# Patient Record
Sex: MALE | Race: OTHER | NOT HISPANIC OR LATINO | URBAN - METROPOLITAN AREA
[De-identification: names, ages, dates, MRNs, and addresses within clinical notes are randomized per-mention and may not be internally consistent; named-entity substitution may affect disease eponyms.]

---

## 2018-01-18 ENCOUNTER — EMERGENCY (EMERGENCY)
Facility: HOSPITAL | Age: 68
LOS: 1 days | Discharge: HOME | End: 2018-01-18
Attending: EMERGENCY MEDICINE | Admitting: EMERGENCY MEDICINE
Payer: MEDICARE

## 2018-01-18 VITALS
DIASTOLIC BLOOD PRESSURE: 67 MMHG | SYSTOLIC BLOOD PRESSURE: 118 MMHG | TEMPERATURE: 98 F | HEART RATE: 62 BPM | RESPIRATION RATE: 18 BRPM | OXYGEN SATURATION: 97 %

## 2018-01-18 VITALS
SYSTOLIC BLOOD PRESSURE: 137 MMHG | HEART RATE: 98 BPM | RESPIRATION RATE: 18 BRPM | WEIGHT: 179.9 LBS | OXYGEN SATURATION: 98 % | DIASTOLIC BLOOD PRESSURE: 84 MMHG | TEMPERATURE: 98 F | HEIGHT: 66 IN

## 2018-01-18 LAB
ALBUMIN SERPL ELPH-MCNC: 4.1 G/DL — SIGNIFICANT CHANGE UP (ref 3.3–5.2)
ALP SERPL-CCNC: 126 U/L — HIGH (ref 40–120)
ALT FLD-CCNC: 22 U/L — SIGNIFICANT CHANGE UP
ANION GAP SERPL CALC-SCNC: 13 MMOL/L — SIGNIFICANT CHANGE UP (ref 5–17)
APTT BLD: 33.7 SEC — SIGNIFICANT CHANGE UP (ref 27.5–37.4)
AST SERPL-CCNC: 17 U/L — SIGNIFICANT CHANGE UP
BILIRUB SERPL-MCNC: 0.8 MG/DL — SIGNIFICANT CHANGE UP (ref 0.4–2)
BUN SERPL-MCNC: 19 MG/DL — SIGNIFICANT CHANGE UP (ref 8–20)
CALCIUM SERPL-MCNC: 10.1 MG/DL — SIGNIFICANT CHANGE UP (ref 8.6–10.2)
CHLORIDE SERPL-SCNC: 99 MMOL/L — SIGNIFICANT CHANGE UP (ref 98–107)
CO2 SERPL-SCNC: 27 MMOL/L — SIGNIFICANT CHANGE UP (ref 22–29)
CREAT SERPL-MCNC: 1.1 MG/DL — SIGNIFICANT CHANGE UP (ref 0.5–1.3)
GLUCOSE SERPL-MCNC: 351 MG/DL — HIGH (ref 70–115)
HCT VFR BLD CALC: 45.8 % — SIGNIFICANT CHANGE UP (ref 42–52)
HGB BLD-MCNC: 15.8 G/DL — SIGNIFICANT CHANGE UP (ref 14–18)
INR BLD: 1 RATIO — SIGNIFICANT CHANGE UP (ref 0.88–1.16)
MCHC RBC-ENTMCNC: 30.4 PG — SIGNIFICANT CHANGE UP (ref 27–31)
MCHC RBC-ENTMCNC: 34.5 G/DL — SIGNIFICANT CHANGE UP (ref 32–36)
MCV RBC AUTO: 88.1 FL — SIGNIFICANT CHANGE UP (ref 80–94)
PLATELET # BLD AUTO: 169 K/UL — SIGNIFICANT CHANGE UP (ref 150–400)
POTASSIUM SERPL-MCNC: 4.7 MMOL/L — SIGNIFICANT CHANGE UP (ref 3.5–5.3)
POTASSIUM SERPL-SCNC: 4.7 MMOL/L — SIGNIFICANT CHANGE UP (ref 3.5–5.3)
PROT SERPL-MCNC: 7 G/DL — SIGNIFICANT CHANGE UP (ref 6.6–8.7)
PROTHROM AB SERPL-ACNC: 11 SEC — SIGNIFICANT CHANGE UP (ref 9.8–12.7)
RBC # BLD: 5.2 M/UL — SIGNIFICANT CHANGE UP (ref 4.6–6.2)
RBC # FLD: 12.4 % — SIGNIFICANT CHANGE UP (ref 11–15.6)
SODIUM SERPL-SCNC: 139 MMOL/L — SIGNIFICANT CHANGE UP (ref 135–145)
TROPONIN T SERPL-MCNC: <0.01 NG/ML — SIGNIFICANT CHANGE UP (ref 0–0.06)
WBC # BLD: 5.6 K/UL — SIGNIFICANT CHANGE UP (ref 4.8–10.8)
WBC # FLD AUTO: 5.6 K/UL — SIGNIFICANT CHANGE UP (ref 4.8–10.8)

## 2018-01-18 PROCEDURE — 70551 MRI BRAIN STEM W/O DYE: CPT | Mod: 26

## 2018-01-18 PROCEDURE — 85730 THROMBOPLASTIN TIME PARTIAL: CPT

## 2018-01-18 PROCEDURE — 93010 ELECTROCARDIOGRAM REPORT: CPT

## 2018-01-18 PROCEDURE — 80053 COMPREHEN METABOLIC PANEL: CPT

## 2018-01-18 PROCEDURE — 93005 ELECTROCARDIOGRAM TRACING: CPT

## 2018-01-18 PROCEDURE — 36415 COLL VENOUS BLD VENIPUNCTURE: CPT

## 2018-01-18 PROCEDURE — 96372 THER/PROPH/DIAG INJ SC/IM: CPT

## 2018-01-18 PROCEDURE — 99284 EMERGENCY DEPT VISIT MOD MDM: CPT | Mod: 25

## 2018-01-18 PROCEDURE — 99284 EMERGENCY DEPT VISIT MOD MDM: CPT

## 2018-01-18 PROCEDURE — 85610 PROTHROMBIN TIME: CPT

## 2018-01-18 PROCEDURE — 82962 GLUCOSE BLOOD TEST: CPT

## 2018-01-18 PROCEDURE — 70551 MRI BRAIN STEM W/O DYE: CPT

## 2018-01-18 PROCEDURE — 85027 COMPLETE CBC AUTOMATED: CPT

## 2018-01-18 PROCEDURE — 84484 ASSAY OF TROPONIN QUANT: CPT

## 2018-01-18 RX ORDER — INSULIN HUMAN 100 [IU]/ML
6 INJECTION, SOLUTION SUBCUTANEOUS ONCE
Qty: 0 | Refills: 0 | Status: COMPLETED | OUTPATIENT
Start: 2018-01-18 | End: 2018-01-18

## 2018-01-18 RX ORDER — MECLIZINE HCL 12.5 MG
25 TABLET ORAL ONCE
Qty: 0 | Refills: 0 | Status: COMPLETED | OUTPATIENT
Start: 2018-01-18 | End: 2018-01-18

## 2018-01-18 RX ORDER — SODIUM CHLORIDE 9 MG/ML
1000 INJECTION INTRAMUSCULAR; INTRAVENOUS; SUBCUTANEOUS ONCE
Qty: 0 | Refills: 0 | Status: COMPLETED | OUTPATIENT
Start: 2018-01-18 | End: 2018-01-18

## 2018-01-18 RX ORDER — MECLIZINE HCL 12.5 MG
1 TABLET ORAL
Qty: 15 | Refills: 0
Start: 2018-01-18 | End: 2018-01-22

## 2018-01-18 RX ADMIN — INSULIN HUMAN 6 UNIT(S): 100 INJECTION, SOLUTION SUBCUTANEOUS at 13:10

## 2018-01-18 RX ADMIN — SODIUM CHLORIDE 1000 MILLILITER(S): 9 INJECTION INTRAMUSCULAR; INTRAVENOUS; SUBCUTANEOUS at 14:04

## 2018-01-18 RX ADMIN — Medication 25 MILLIGRAM(S): at 14:02

## 2018-01-18 NOTE — ED ADULT NURSE NOTE - OBJECTIVE STATEMENT
68 y/o male with binta of HLD & DM c/o of dizziness upon waking that began yesterday morning and repeated today. Symptoms are intermittent and pt. denies N/V, blurry vision, or HA at this time.

## 2018-01-18 NOTE — ED PROVIDER NOTE - CHPI ED SYMPTOMS NEG
no change in level of consciousness/no nausea/no numbness/no fever/no loss of consciousness/no weakness/no blurred vision/no vomiting/no confusion

## 2018-01-18 NOTE — ED PROVIDER NOTE - OBJECTIVE STATEMENT
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

## 2018-01-18 NOTE — ED PROVIDER NOTE - EYES, MLM
Clear bilaterally, pupils equal, round and reactive to light. 1-2 beat left lateral nystagmus no vertical nystagmus

## 2018-01-18 NOTE — ED PROVIDER NOTE - CARE PLAN
Principal Discharge DX:	Vertigo Principal Discharge DX:	Vertigo  Secondary Diagnosis:	DM (diabetes mellitus)

## 2019-11-15 ENCOUNTER — INPATIENT (INPATIENT)
Facility: HOSPITAL | Age: 69
LOS: 5 days | Discharge: HOME | DRG: 247 | End: 2019-11-21
Attending: INTERNAL MEDICINE | Admitting: STUDENT IN AN ORGANIZED HEALTH CARE EDUCATION/TRAINING PROGRAM
Payer: COMMERCIAL

## 2019-11-15 VITALS
HEART RATE: 95 BPM | RESPIRATION RATE: 20 BRPM | WEIGHT: 177.91 LBS | TEMPERATURE: 98 F | DIASTOLIC BLOOD PRESSURE: 74 MMHG | OXYGEN SATURATION: 97 % | HEIGHT: 66 IN | SYSTOLIC BLOOD PRESSURE: 143 MMHG

## 2019-11-15 DIAGNOSIS — I24.9 ACUTE ISCHEMIC HEART DISEASE, UNSPECIFIED: ICD-10-CM

## 2019-11-15 PROBLEM — E11.9 TYPE 2 DIABETES MELLITUS WITHOUT COMPLICATIONS: Chronic | Status: ACTIVE | Noted: 2018-01-18

## 2019-11-15 PROBLEM — E78.5 HYPERLIPIDEMIA, UNSPECIFIED: Chronic | Status: ACTIVE | Noted: 2018-01-18

## 2019-11-15 LAB
ALBUMIN SERPL ELPH-MCNC: 3.9 G/DL — SIGNIFICANT CHANGE UP (ref 3.3–5.2)
ALP SERPL-CCNC: 129 U/L — HIGH (ref 40–120)
ALT FLD-CCNC: 26 U/L — SIGNIFICANT CHANGE UP
ANION GAP SERPL CALC-SCNC: 9 MMOL/L — SIGNIFICANT CHANGE UP (ref 5–17)
APTT BLD: 27.7 SEC — SIGNIFICANT CHANGE UP (ref 27.5–36.3)
AST SERPL-CCNC: 25 U/L — SIGNIFICANT CHANGE UP
BASOPHILS # BLD AUTO: 0.02 K/UL — SIGNIFICANT CHANGE UP (ref 0–0.2)
BASOPHILS NFR BLD AUTO: 0.3 % — SIGNIFICANT CHANGE UP (ref 0–2)
BILIRUB SERPL-MCNC: 0.9 MG/DL — SIGNIFICANT CHANGE UP (ref 0.4–2)
BUN SERPL-MCNC: 19 MG/DL — SIGNIFICANT CHANGE UP (ref 8–20)
CALCIUM SERPL-MCNC: 9 MG/DL — SIGNIFICANT CHANGE UP (ref 8.6–10.2)
CHLORIDE SERPL-SCNC: 99 MMOL/L — SIGNIFICANT CHANGE UP (ref 98–107)
CO2 SERPL-SCNC: 25 MMOL/L — SIGNIFICANT CHANGE UP (ref 22–29)
CREAT SERPL-MCNC: 0.92 MG/DL — SIGNIFICANT CHANGE UP (ref 0.5–1.3)
EOSINOPHIL # BLD AUTO: 0.06 K/UL — SIGNIFICANT CHANGE UP (ref 0–0.5)
EOSINOPHIL NFR BLD AUTO: 0.9 % — SIGNIFICANT CHANGE UP (ref 0–6)
GLUCOSE SERPL-MCNC: 329 MG/DL — HIGH (ref 70–115)
HCT VFR BLD CALC: 44.5 % — SIGNIFICANT CHANGE UP (ref 39–50)
HGB BLD-MCNC: 15.1 G/DL — SIGNIFICANT CHANGE UP (ref 13–17)
IMM GRANULOCYTES NFR BLD AUTO: 0.5 % — SIGNIFICANT CHANGE UP (ref 0–1.5)
INR BLD: 0.94 RATIO — SIGNIFICANT CHANGE UP (ref 0.88–1.16)
LACTATE BLDV-MCNC: 0.7 MMOL/L — SIGNIFICANT CHANGE UP (ref 0.5–2)
LIDOCAIN IGE QN: 128 U/L — HIGH (ref 22–51)
LYMPHOCYTES # BLD AUTO: 1.14 K/UL — SIGNIFICANT CHANGE UP (ref 1–3.3)
LYMPHOCYTES # BLD AUTO: 17.1 % — SIGNIFICANT CHANGE UP (ref 13–44)
MCHC RBC-ENTMCNC: 29.1 PG — SIGNIFICANT CHANGE UP (ref 27–34)
MCHC RBC-ENTMCNC: 33.9 GM/DL — SIGNIFICANT CHANGE UP (ref 32–36)
MCV RBC AUTO: 85.7 FL — SIGNIFICANT CHANGE UP (ref 80–100)
MONOCYTES # BLD AUTO: 0.47 K/UL — SIGNIFICANT CHANGE UP (ref 0–0.9)
MONOCYTES NFR BLD AUTO: 7.1 % — SIGNIFICANT CHANGE UP (ref 2–14)
NEUTROPHILS # BLD AUTO: 4.93 K/UL — SIGNIFICANT CHANGE UP (ref 1.8–7.4)
NEUTROPHILS NFR BLD AUTO: 74.1 % — SIGNIFICANT CHANGE UP (ref 43–77)
PLATELET # BLD AUTO: 153 K/UL — SIGNIFICANT CHANGE UP (ref 150–400)
POTASSIUM SERPL-MCNC: 4.5 MMOL/L — SIGNIFICANT CHANGE UP (ref 3.5–5.3)
POTASSIUM SERPL-SCNC: 4.5 MMOL/L — SIGNIFICANT CHANGE UP (ref 3.5–5.3)
PROT SERPL-MCNC: 6.9 G/DL — SIGNIFICANT CHANGE UP (ref 6.6–8.7)
PROTHROM AB SERPL-ACNC: 10.8 SEC — SIGNIFICANT CHANGE UP (ref 10–12.9)
RBC # BLD: 5.19 M/UL — SIGNIFICANT CHANGE UP (ref 4.2–5.8)
RBC # FLD: 11.9 % — SIGNIFICANT CHANGE UP (ref 10.3–14.5)
SODIUM SERPL-SCNC: 133 MMOL/L — LOW (ref 135–145)
TROPONIN T SERPL-MCNC: <0.01 NG/ML — SIGNIFICANT CHANGE UP (ref 0–0.06)
TROPONIN T SERPL-MCNC: <0.01 NG/ML — SIGNIFICANT CHANGE UP (ref 0–0.06)
WBC # BLD: 6.65 K/UL — SIGNIFICANT CHANGE UP (ref 3.8–10.5)
WBC # FLD AUTO: 6.65 K/UL — SIGNIFICANT CHANGE UP (ref 3.8–10.5)

## 2019-11-15 PROCEDURE — 93010 ELECTROCARDIOGRAM REPORT: CPT | Mod: 76

## 2019-11-15 PROCEDURE — 99218: CPT

## 2019-11-15 PROCEDURE — 99255 IP/OBS CONSLTJ NEW/EST HI 80: CPT

## 2019-11-15 PROCEDURE — 99222 1ST HOSP IP/OBS MODERATE 55: CPT

## 2019-11-15 PROCEDURE — 71046 X-RAY EXAM CHEST 2 VIEWS: CPT | Mod: 26

## 2019-11-15 RX ORDER — GLUCAGON INJECTION, SOLUTION 0.5 MG/.1ML
1 INJECTION, SOLUTION SUBCUTANEOUS ONCE
Refills: 0 | Status: DISCONTINUED | OUTPATIENT
Start: 2019-11-15 | End: 2019-11-21

## 2019-11-15 RX ORDER — INSULIN LISPRO 100/ML
VIAL (ML) SUBCUTANEOUS AT BEDTIME
Refills: 0 | Status: DISCONTINUED | OUTPATIENT
Start: 2019-11-15 | End: 2019-11-16

## 2019-11-15 RX ORDER — NICOTINE POLACRILEX 2 MG
1 GUM BUCCAL DAILY
Refills: 0 | Status: DISCONTINUED | OUTPATIENT
Start: 2019-11-15 | End: 2019-11-21

## 2019-11-15 RX ORDER — NITROGLYCERIN 6.5 MG
0.4 CAPSULE, EXTENDED RELEASE ORAL
Refills: 0 | Status: DISCONTINUED | OUTPATIENT
Start: 2019-11-15 | End: 2019-11-21

## 2019-11-15 RX ORDER — ATORVASTATIN CALCIUM 80 MG/1
80 TABLET, FILM COATED ORAL AT BEDTIME
Refills: 0 | Status: DISCONTINUED | OUTPATIENT
Start: 2019-11-15 | End: 2019-11-21

## 2019-11-15 RX ORDER — DEXTROSE 50 % IN WATER 50 %
15 SYRINGE (ML) INTRAVENOUS ONCE
Refills: 0 | Status: DISCONTINUED | OUTPATIENT
Start: 2019-11-15 | End: 2019-11-21

## 2019-11-15 RX ORDER — LOSARTAN POTASSIUM 100 MG/1
50 TABLET, FILM COATED ORAL DAILY
Refills: 0 | Status: DISCONTINUED | OUTPATIENT
Start: 2019-11-15 | End: 2019-11-15

## 2019-11-15 RX ORDER — METOPROLOL TARTRATE 50 MG
25 TABLET ORAL
Refills: 0 | Status: DISCONTINUED | OUTPATIENT
Start: 2019-11-15 | End: 2019-11-21

## 2019-11-15 RX ORDER — FENOFIBRATE,MICRONIZED 130 MG
145 CAPSULE ORAL DAILY
Refills: 0 | Status: DISCONTINUED | OUTPATIENT
Start: 2019-11-15 | End: 2019-11-21

## 2019-11-15 RX ORDER — SODIUM CHLORIDE 9 MG/ML
1000 INJECTION, SOLUTION INTRAVENOUS
Refills: 0 | Status: DISCONTINUED | OUTPATIENT
Start: 2019-11-15 | End: 2019-11-21

## 2019-11-15 RX ORDER — ASPIRIN/CALCIUM CARB/MAGNESIUM 324 MG
81 TABLET ORAL DAILY
Refills: 0 | Status: DISCONTINUED | OUTPATIENT
Start: 2019-11-15 | End: 2019-11-17

## 2019-11-15 RX ORDER — DEXTROSE 50 % IN WATER 50 %
12.5 SYRINGE (ML) INTRAVENOUS ONCE
Refills: 0 | Status: DISCONTINUED | OUTPATIENT
Start: 2019-11-15 | End: 2019-11-21

## 2019-11-15 RX ORDER — FLUTICASONE PROPIONATE 50 MCG
2 SPRAY, SUSPENSION NASAL EVERY 12 HOURS
Refills: 0 | Status: COMPLETED | OUTPATIENT
Start: 2019-11-15 | End: 2019-11-18

## 2019-11-15 RX ORDER — LOSARTAN POTASSIUM 100 MG/1
50 TABLET, FILM COATED ORAL DAILY
Refills: 0 | Status: DISCONTINUED | OUTPATIENT
Start: 2019-11-15 | End: 2019-11-20

## 2019-11-15 RX ORDER — SODIUM CHLORIDE 9 MG/ML
1000 INJECTION INTRAMUSCULAR; INTRAVENOUS; SUBCUTANEOUS ONCE
Refills: 0 | Status: COMPLETED | OUTPATIENT
Start: 2019-11-15 | End: 2019-11-15

## 2019-11-15 RX ORDER — INSULIN LISPRO 100/ML
VIAL (ML) SUBCUTANEOUS
Refills: 0 | Status: DISCONTINUED | OUTPATIENT
Start: 2019-11-15 | End: 2019-11-16

## 2019-11-15 RX ORDER — DEXTROSE 50 % IN WATER 50 %
25 SYRINGE (ML) INTRAVENOUS ONCE
Refills: 0 | Status: DISCONTINUED | OUTPATIENT
Start: 2019-11-15 | End: 2019-11-21

## 2019-11-15 RX ORDER — SIMVASTATIN 20 MG/1
0 TABLET, FILM COATED ORAL
Qty: 0 | Refills: 0 | DISCHARGE

## 2019-11-15 RX ORDER — INSULIN GLARGINE 100 [IU]/ML
10 INJECTION, SOLUTION SUBCUTANEOUS AT BEDTIME
Refills: 0 | Status: DISCONTINUED | OUTPATIENT
Start: 2019-11-15 | End: 2019-11-16

## 2019-11-15 RX ORDER — METOPROLOL TARTRATE 50 MG
25 TABLET ORAL
Refills: 0 | Status: DISCONTINUED | OUTPATIENT
Start: 2019-11-15 | End: 2019-11-15

## 2019-11-15 RX ADMIN — SODIUM CHLORIDE 1000 MILLILITER(S): 9 INJECTION INTRAMUSCULAR; INTRAVENOUS; SUBCUTANEOUS at 16:03

## 2019-11-15 RX ADMIN — ATORVASTATIN CALCIUM 80 MILLIGRAM(S): 80 TABLET, FILM COATED ORAL at 21:18

## 2019-11-15 RX ADMIN — LOSARTAN POTASSIUM 50 MILLIGRAM(S): 100 TABLET, FILM COATED ORAL at 21:18

## 2019-11-15 RX ADMIN — INSULIN GLARGINE 10 UNIT(S): 100 INJECTION, SOLUTION SUBCUTANEOUS at 23:57

## 2019-11-15 NOTE — ED CDU PROVIDER INITIAL DAY NOTE - MEDICAL DECISION MAKING DETAILS
69 y. male PMH of DM II , HLD With 2 days substernal chest pain , heaviness and intermittent  and With SOB , non compliant with DM med at home as per himself- initially admitted to the obs - seen by cardiology r.o ACS and recommend the admission , for possible cath- serial trop is ordered will admi the pt or tele - called for admission

## 2019-11-15 NOTE — ED CDU PROVIDER INITIAL DAY NOTE - CARDIAC, MLM
Normal rate, regular rhythm.  Heart sounds S1, S2.  no jvd no chest wall TTP , NO LE edema or calf TTP

## 2019-11-15 NOTE — ED CDU PROVIDER INITIAL DAY NOTE - CONSTITUTIONAL, MLM
normal... Well appearing, well nourished, awake, alert, oriented to person, place, time/situation and in no apparent distress. on cardiac monitoring

## 2019-11-15 NOTE — ED PROVIDER NOTE - OBJECTIVE STATEMENT
Pertinent PMH/PSH/FHx/SHx and Review of Systems contained within:  Patient presents to the ED for fatigue with chest tightness and cough for 2 weeks.  PMH of HLD, NIDDM2.  PSH of distant cholecystectomy.  VSS.  family bedside.  Statesinitially ill with URI sx but cough has persisted and fatigue.  no trauma.  otherwise baseline.  Non toxic.  Well appearing. No aggravating or relieving factors. No other pertinent PMH.  No other pertinent PSH.  No other pertinent FHx.  Patient denies EtOH/tobacco/illicit substance use. No fever/chills, No photophobia/eye pain/changes in vision, No ear pain/sore throat/dysphagia, No chest pain/palpitations, no SOB/wheeze/stridor, No abdominal pain, No N/V/D, no dysuria/frequency/discharge, No neck/back pain, no rash, no changes in neurological status/function.

## 2019-11-15 NOTE — H&P ADULT - NSHPLABSRESULTS_GEN_ALL_CORE
15.1   6.65  )-----------( 153      ( 15 Nov 2019 14:48 )             44.5     11-15    133<L>  |  99  |  19.0  ----------------------------<  329<H>  4.5   |  25.0  |  0.92    Ca    9.0      15 Nov 2019 14:48    TPro  6.9  /  Alb  3.9  /  TBili  0.9  /  DBili  x   /  AST  25  /  ALT  26  /  AlkPhos  129<H>  11-15      EKG: NSR, HR 91, RBBB (also seen on 1/2018 EKG)    CARDIAC MARKERS ( 15 Nov 2019 20:08 )  x     / <0.01 ng/mL / x     / x     / x      CARDIAC MARKERS ( 15 Nov 2019 14:48 )  x     / <0.01 ng/mL / x     / x     / x

## 2019-11-15 NOTE — ED CDU PROVIDER INITIAL DAY NOTE - ATTENDING CONTRIBUTION TO CARE
69yoM; with pmh signif for HTN, HLD, DM (non-compliant with medications); now p/w chest pain and jaime x3 weeks with cough.  -serial enzymes  -cardiac monitor  -cardiology consult

## 2019-11-15 NOTE — ED CDU PROVIDER INITIAL DAY NOTE - OBJECTIVE STATEMENT
68 y/o male PMH diabetic type 2 on po med not compliant with meds, hyperlipidemia. presents in ER and c.o substernal chest pain and heavyness pain that is intermittent and get worse over the time  and non radiating associated with SOB sometimes short and some times longer 20 min is other symptoms that he did not take any med for it , states he never had before  and he Has URI symptoms for the past 2-3 weeks. denies any fever or chills , leg swollen , cough blood or leg swollen , denies any hx of DVt or PE and recent travel   Now now more heaviness. Never had similar symptoms in the past.   No n/v or diaphoresis.  Smokes a few cigs per day now but smoked more in the past.

## 2019-11-15 NOTE — H&P ADULT - HISTORY OF PRESENT ILLNESS
69yoM hx DM, HLD, medication non-compliance presenting with intermittent episodes of mid-sternal, non-radiating chest pain for the past few days.  Reports sensation of chest pressure that occurs both at rest and during exertion that typically last for up to 20 minutes.  Denies any associated dyspnea, palpitations, diaphoresis, nausea or vomiting.  Pt has never had any prior exercise testing or cardiac caths.  Of note, pt also having a 2-3 week history of non-productive cough associated with post nasal drip.  Denies any fevers or chills.  On admission, EKG showed NSR, HR 91 with RBBB which was also seen on 1/2018 EKG.  Troponin negative x 2.  CXR was negative for acute pathology.  Pt was initially admitted to CDU in ED, was seen by cardiology while there who advised medicine admission for ischemic evaluation.

## 2019-11-15 NOTE — ED PROVIDER NOTE - CLINICAL SUMMARY MEDICAL DECISION MAKING FREE TEXT BOX
Patient presetns with chest pain in the setting of recent URI.  VSS but mildly HTN.  Chest xray demonstrates no acute pathology. Lab values do not require emergent intervention. d/w Dr. Canales and will transfer to observation

## 2019-11-15 NOTE — H&P ADULT - NSHPPHYSICALEXAM_GEN_ALL_CORE
Vital Signs Last 24 Hrs  T(C): 36.6 (15 Nov 2019 19:22), Max: 37.1 (15 Nov 2019 17:45)  T(F): 97.8 (15 Nov 2019 19:22), Max: 98.7 (15 Nov 2019 17:45)  HR: 79 (15 Nov 2019 19:22) (78 - 95)  BP: 128/79 (15 Nov 2019 19:22) (128/79 - 178/78)  BP(mean): --  RR: 20 (15 Nov 2019 19:22) (18 - 20)  SpO2: 98% (15 Nov 2019 19:22) (97% - 98%)    GENERAL:  Well-appearing, not in acute distress  EYES:  Clear conjuctiva, extraocular movement intact  ENT: Moist mucous membranes  RESP:  Non-labored breathing pattern, lungs clear to ausculation in anterior fields  CV: Regular rate and rhythm, no murmurs appreciated, no lower extremity edema  GI: Soft, non-tender, non-distended  NEURO: Awake, alert, conversant, upper and lower extremity strength 5/5, light touch sensation grossly intact  PSYCH: Calm, cooperative  SKIN: No rash or lesions, warm and dry

## 2019-11-15 NOTE — ED CDU PROVIDER DISPOSITION NOTE - CLINICAL COURSE
68 y/o male PMh of DM II and HLD With 2 days substernal chest pain sharp none radiating intermittent seen by cardiology  that recommend to be admitted to medicine for possible further evaluation

## 2019-11-15 NOTE — CONSULT NOTE ADULT - ASSESSMENT
70 yo pleasant male with untreated HTN, HLD and diabetes, smoker who complains of intermittent chest discomfort. His chest discomfort is not pleuritic.   EKG with RBBB, unchanged from last year.  CE x1 negative.   No bronchitis or PNA.    1. Admit to tele, r/o ACS. serial enzymes and EKG.  2. CP is concerning for cardiac etiology. He will need ischemic evaluation  3. Start BP meds. BB and ARB  4. Statin therapy  5. TTE  6. Flonase and mucinex for URI symptoms.  7. DM as per primary team.  8. I will follow with you.

## 2019-11-15 NOTE — H&P ADULT - ATTENDING COMMENTS
Estimated LOS: 3 days Estimated LOS: 3 days    Delayed entry note:  Notified around 6:30AM that pt troponin increased to 0.08.  Started pt on heparin gtt.  Reviewed EKG which shows intraventricular delay.  Came to assess pt at bedside, he appeared comfortable but endorsing some chest discomfort.  Advised RN to give a sublingual nitro.

## 2019-11-15 NOTE — CONSULT NOTE ADULT - SUBJECTIVE AND OBJECTIVE BOX
Patient is a 69y old  Male who presents with a chief complaint of chest heaviness.     HPI:  70 yo male, diabetic not compliant with meds, hyperlipidemia. Has URI symptoms for the past 2-3 weeks. No fever or chills. Mostly post nasal drip. For the past day, he has substernal heaviness, lasting a few minutes to 20 minutes, no radiation today felt a little SOB with it. Not reproducible with cough or palpation of the anterior chest wall.   Now now more heaviness. Never had similar symptoms in the past.   No n/v or diaphoresis.  Smokes a few cigs per day now but smoked more in the past. No recent travel. No calf tenderness.     PAST MEDICAL & SURGICAL HISTORY:  HLD (hyperlipidemia)  DM (diabetes mellitus)  No significant past surgical history    Allergies  No Known Allergies    Meds at home:  Metformin  Januvia  Farxiga   Lipitor  Fenofibrate.     FAMILY HISTORY:  negative for premature CAD    SOCIAL HISTORY:  + tob use, no drug or ETOH abuse.     REVIEW OF SYSTEMS:  CONSTITUTIONAL: No fever, weight loss, + fatigue  EYES: No eye pain, visual disturbances, or discharge  ENMT:  No difficulty hearing, tinnitus, vertigo; No sinus or throat pain  NECK: No pain or stiffness  RESPIRATORY: see HPI, cough and post nasal drip  CARDIOVASCULAR: No palpitations, passing out, dizziness, or leg swelling, see HPI  GASTROINTESTINAL: No abdominal or epigastric pain. No nausea, vomiting, or hematemesis; No diarrhea or constipation. No melena or hematochezia.  GENITOURINARY: No dysuria, frequency, hematuria, or incontinence  NEUROLOGICAL: No headaches, memory loss, loss of strength, numbness, or tremors  SKIN: No itching, burning, rashes, or lesions   LYMPH Nodes: No enlarged glands  ENDOCRINE: No heat or cold intolerance  MUSCULOSKELETAL: No joint pain or swelling; No muscle, back, or extremity pain  PSYCHIATRIC: No depression, anxiety, mood swings, or difficulty sleeping  HEME/LYMPH: No easy bruising, or bleeding gums  ALLERY AND IMMUNOLOGIC: No hives or eczema	    Vital Signs Last 24 Hrs  T(C): 37.1 (15 Nov 2019 17:45), Max: 37.1 (15 Nov 2019 17:45)  T(F): 98.7 (15 Nov 2019 17:45), Max: 98.7 (15 Nov 2019 17:45)  HR: 78 (15 Nov 2019 17:45) (78 - 95)  BP: 178/78 (15 Nov 2019 17:45) (143/74 - 178/78)  RR: 18 (15 Nov 2019 17:45) (18 - 20)  SpO2: 98% (15 Nov 2019 17:45) (97% - 98%)    PHYSICAL EXAM:  Appearance: Normal, well nourished, in NAD	  HEENT:   Normal oral mucosa, PERRL, EOMI, sclera non-icteric	  Lymphatic: No cervical lymphadenopathy  Cardiovascular: Normal S1 S2, No JVD, 2/6 sm lsb. no pain on palpation of acw.    Respiratory: Lungs clear to auscultation	  Psychiatry: A & O x 3, Mood & affect appropriate  Gastrointestinal:  Soft, Non-tender, + BS, no bruits	  Skin: No rashes, No ecchymoses, No cyanosis  Neurologic: Grossly non-focal with full strength in all four extremities  Extremities: Normal range of motion, No clubbing, cyanosis or edema  Vascular: Peripheral pulses palpable 2+ bilaterally    INTERPRETATION OF TELEMETRY:  NSR    ECG: NSR, RBBB    LABS:                        15.1   6.65  )-----------( 153      ( 15 Nov 2019 14:48 )             44.5     11-15    133<L>  |  99  |  19.0  ----------------------------<  329<H>  4.5   |  25.0  |  0.92    Ca    9.0      15 Nov 2019 14:48    TPro  6.9  /  Alb  3.9  /  TBili  0.9  /  DBili  x   /  AST  25  /  ALT  26  /  AlkPhos  129<H>  11-15    CARDIAC MARKERS ( 15 Nov 2019 14:48 )  x     / <0.01 ng/mL / x     / x     / x      PT/INR - ( 15 Nov 2019 14:48 )   PT: 10.8 sec;   INR: 0.94 ratio    PTT - ( 15 Nov 2019 14:48 )  PTT:27.7 sec    RADIOLOGY & ADDITIONAL STUDIES:  CXR: Normal

## 2019-11-15 NOTE — ED ADULT NURSE REASSESSMENT NOTE - NS ED NURSE REASSESS COMMENT FT1
This nurse was called to the bedside pt appears to be anxious screaming and yelling , c/o chest pain , STAT EKG done ,VSS , pt updated regarding status

## 2019-11-16 LAB
ANION GAP SERPL CALC-SCNC: 13 MMOL/L — SIGNIFICANT CHANGE UP (ref 5–17)
APTT BLD: 68.1 SEC — HIGH (ref 27.5–36.3)
APTT BLD: 80.9 SEC — HIGH (ref 27.5–36.3)
BUN SERPL-MCNC: 17 MG/DL — SIGNIFICANT CHANGE UP (ref 8–20)
CALCIUM SERPL-MCNC: 8.4 MG/DL — LOW (ref 8.6–10.2)
CHLORIDE SERPL-SCNC: 101 MMOL/L — SIGNIFICANT CHANGE UP (ref 98–107)
CHOLEST SERPL-MCNC: 235 MG/DL — HIGH (ref 110–199)
CK SERPL-CCNC: 142 U/L — SIGNIFICANT CHANGE UP (ref 30–200)
CO2 SERPL-SCNC: 21 MMOL/L — LOW (ref 22–29)
CREAT SERPL-MCNC: 0.9 MG/DL — SIGNIFICANT CHANGE UP (ref 0.5–1.3)
GLUCOSE BLDC GLUCOMTR-MCNC: 172 MG/DL — HIGH (ref 70–99)
GLUCOSE BLDC GLUCOMTR-MCNC: 195 MG/DL — HIGH (ref 70–99)
GLUCOSE BLDC GLUCOMTR-MCNC: 203 MG/DL — HIGH (ref 70–99)
GLUCOSE BLDC GLUCOMTR-MCNC: 211 MG/DL — HIGH (ref 70–99)
GLUCOSE BLDC GLUCOMTR-MCNC: 228 MG/DL — HIGH (ref 70–99)
GLUCOSE SERPL-MCNC: 197 MG/DL — HIGH (ref 70–115)
HBA1C BLD-MCNC: 10.7 % — HIGH (ref 4–5.6)
HCT VFR BLD CALC: 42.3 % — SIGNIFICANT CHANGE UP (ref 39–50)
HDLC SERPL-MCNC: 31 MG/DL — LOW
HGB BLD-MCNC: 14.5 G/DL — SIGNIFICANT CHANGE UP (ref 13–17)
LIPID PNL WITH DIRECT LDL SERPL: 136 MG/DL — SIGNIFICANT CHANGE UP
MCHC RBC-ENTMCNC: 29.9 PG — SIGNIFICANT CHANGE UP (ref 27–34)
MCHC RBC-ENTMCNC: 34.3 GM/DL — SIGNIFICANT CHANGE UP (ref 32–36)
MCV RBC AUTO: 87.2 FL — SIGNIFICANT CHANGE UP (ref 80–100)
PLATELET # BLD AUTO: 156 K/UL — SIGNIFICANT CHANGE UP (ref 150–400)
POTASSIUM SERPL-MCNC: 4.2 MMOL/L — SIGNIFICANT CHANGE UP (ref 3.5–5.3)
POTASSIUM SERPL-SCNC: 4.2 MMOL/L — SIGNIFICANT CHANGE UP (ref 3.5–5.3)
RBC # BLD: 4.85 M/UL — SIGNIFICANT CHANGE UP (ref 4.2–5.8)
RBC # FLD: 11.9 % — SIGNIFICANT CHANGE UP (ref 10.3–14.5)
SODIUM SERPL-SCNC: 135 MMOL/L — SIGNIFICANT CHANGE UP (ref 135–145)
TOTAL CHOLESTEROL/HDL RATIO MEASUREMENT: 8 RATIO — SIGNIFICANT CHANGE UP (ref 3.4–9.6)
TRIGL SERPL-MCNC: 338 MG/DL — HIGH (ref 10–200)
TROPONIN T SERPL-MCNC: 0.08 NG/ML — HIGH (ref 0–0.06)
TROPONIN T SERPL-MCNC: 0.18 NG/ML — HIGH (ref 0–0.06)
TROPONIN T SERPL-MCNC: 0.2 NG/ML — HIGH (ref 0–0.06)
WBC # BLD: 6.65 K/UL — SIGNIFICANT CHANGE UP (ref 3.8–10.5)
WBC # FLD AUTO: 6.65 K/UL — SIGNIFICANT CHANGE UP (ref 3.8–10.5)

## 2019-11-16 PROCEDURE — 99232 SBSQ HOSP IP/OBS MODERATE 35: CPT

## 2019-11-16 PROCEDURE — 93010 ELECTROCARDIOGRAM REPORT: CPT

## 2019-11-16 PROCEDURE — 99233 SBSQ HOSP IP/OBS HIGH 50: CPT

## 2019-11-16 RX ORDER — CLOPIDOGREL BISULFATE 75 MG/1
300 TABLET, FILM COATED ORAL ONCE
Refills: 0 | Status: COMPLETED | OUTPATIENT
Start: 2019-11-16 | End: 2019-11-16

## 2019-11-16 RX ORDER — LORATADINE 10 MG/1
10 TABLET ORAL DAILY
Refills: 0 | Status: DISCONTINUED | OUTPATIENT
Start: 2019-11-16 | End: 2019-11-21

## 2019-11-16 RX ORDER — HEPARIN SODIUM 5000 [USP'U]/ML
5000 INJECTION INTRAVENOUS; SUBCUTANEOUS ONCE
Refills: 0 | Status: COMPLETED | OUTPATIENT
Start: 2019-11-16 | End: 2019-11-16

## 2019-11-16 RX ORDER — CLOPIDOGREL BISULFATE 75 MG/1
75 TABLET, FILM COATED ORAL DAILY
Refills: 0 | Status: DISCONTINUED | OUTPATIENT
Start: 2019-11-17 | End: 2019-11-21

## 2019-11-16 RX ORDER — HEPARIN SODIUM 5000 [USP'U]/ML
5000 INJECTION INTRAVENOUS; SUBCUTANEOUS EVERY 6 HOURS
Refills: 0 | Status: DISCONTINUED | OUTPATIENT
Start: 2019-11-16 | End: 2019-11-18

## 2019-11-16 RX ORDER — INSULIN GLARGINE 100 [IU]/ML
12 INJECTION, SOLUTION SUBCUTANEOUS AT BEDTIME
Refills: 0 | Status: DISCONTINUED | OUTPATIENT
Start: 2019-11-16 | End: 2019-11-21

## 2019-11-16 RX ORDER — HEPARIN SODIUM 5000 [USP'U]/ML
INJECTION INTRAVENOUS; SUBCUTANEOUS
Qty: 25000 | Refills: 0 | Status: DISCONTINUED | OUTPATIENT
Start: 2019-11-16 | End: 2019-11-18

## 2019-11-16 RX ORDER — INSULIN LISPRO 100/ML
VIAL (ML) SUBCUTANEOUS
Refills: 0 | Status: DISCONTINUED | OUTPATIENT
Start: 2019-11-16 | End: 2019-11-21

## 2019-11-16 RX ORDER — ACETAMINOPHEN 500 MG
650 TABLET ORAL EVERY 6 HOURS
Refills: 0 | Status: DISCONTINUED | OUTPATIENT
Start: 2019-11-16 | End: 2019-11-21

## 2019-11-16 RX ADMIN — Medication 4: at 21:52

## 2019-11-16 RX ADMIN — Medication 650 MILLIGRAM(S): at 21:45

## 2019-11-16 RX ADMIN — Medication 81 MILLIGRAM(S): at 13:19

## 2019-11-16 RX ADMIN — Medication 2 SPRAY(S): at 18:31

## 2019-11-16 RX ADMIN — Medication 650 MILLIGRAM(S): at 20:45

## 2019-11-16 RX ADMIN — CLOPIDOGREL BISULFATE 300 MILLIGRAM(S): 75 TABLET, FILM COATED ORAL at 17:57

## 2019-11-16 RX ADMIN — Medication 1: at 08:56

## 2019-11-16 RX ADMIN — LORATADINE 10 MILLIGRAM(S): 10 TABLET ORAL at 22:27

## 2019-11-16 RX ADMIN — HEPARIN SODIUM 900 UNIT(S)/HR: 5000 INJECTION INTRAVENOUS; SUBCUTANEOUS at 16:28

## 2019-11-16 RX ADMIN — HEPARIN SODIUM 900 UNIT(S)/HR: 5000 INJECTION INTRAVENOUS; SUBCUTANEOUS at 23:35

## 2019-11-16 RX ADMIN — Medication 145 MILLIGRAM(S): at 13:21

## 2019-11-16 RX ADMIN — HEPARIN SODIUM 1000 UNIT(S)/HR: 5000 INJECTION INTRAVENOUS; SUBCUTANEOUS at 08:57

## 2019-11-16 RX ADMIN — Medication 650 MILLIGRAM(S): at 13:42

## 2019-11-16 RX ADMIN — HEPARIN SODIUM 5000 UNIT(S): 5000 INJECTION INTRAVENOUS; SUBCUTANEOUS at 08:56

## 2019-11-16 RX ADMIN — Medication 2 SPRAY(S): at 08:56

## 2019-11-16 RX ADMIN — Medication 4: at 17:58

## 2019-11-16 RX ADMIN — ATORVASTATIN CALCIUM 80 MILLIGRAM(S): 80 TABLET, FILM COATED ORAL at 21:52

## 2019-11-16 RX ADMIN — Medication 650 MILLIGRAM(S): at 13:24

## 2019-11-16 RX ADMIN — INSULIN GLARGINE 12 UNIT(S): 100 INJECTION, SOLUTION SUBCUTANEOUS at 21:52

## 2019-11-16 NOTE — PROGRESS NOTE ADULT - ASSESSMENT
69yoM hx DM, HLD, medication non-compliance presenting with intermittent episodes of mid-sternal, non-radiating chest pain for the past few days being admitted for ischemic evaluation     NSTEMI, asymptomatic, stable  -cp resolved, trop trending after initial neg x2, now 0.18, cont trending q6h to peak  -pending intervention on monday, pt advised to inform staff of recurrent cp immediately as it may require immediate intervention  -tele  -ASA, B-blocker, ARB, statin, hep drip, Sublingual nitro PRN  -TTE ordered, pending to be done  -ekg in am, stat if cp recurs    DM2 not on insulin complicated by asymptomatic hyperglycemia, stbale  -On oral agents, but non-compliant with meds  -iss + fs achs, inc lantus to 12u, titrate to goal  -HgbA1c 10.7, uncontrolled, diabetic nurse educator cs pending    Rhinitis, likely viral, stable  -CXR negative for acute pathology   -supportive care, Flonase, Mucinex PRN    Hyponatremia, resolved    Tobacco Use, stable  -Low dose nicotine patch    Prophylactic measure  -Intermittent prophylactic measure , hep drip    Dispo. dc after cardiac w/u completed, likely tues/wedn    outpt fu. pmd, ss cardio

## 2019-11-16 NOTE — ED ADULT NURSE REASSESSMENT NOTE - NS ED NURSE REASSESS COMMENT FT1
pt denies sob denies cp denies n/v/d. trop elevated with hx of pressure on chest, started on heparin. iv checked and flushes with + blood return. afebrile will continue to monitor updated patient on plan of care, and fall precautions in place patient educated on safety measures

## 2019-11-16 NOTE — ED ADULT NURSE REASSESSMENT NOTE - NS ED NURSE REASSESS COMMENT FT1
Pt sleeping in stretcher in no apparent signs of distress. Pt remains on cardiac monitor, PIV site WNL. Pt status unchanged, refer to flowsheet and chart, pt ID band in place, pt safety maintained, pt hemodynamically stable, updated on plan of care. Awaiting clean bed. Call light provided, fall safety reinforced. Will continue to monitor

## 2019-11-16 NOTE — ED ADULT NURSE REASSESSMENT NOTE - NS ED NURSE REASSESS COMMENT FT1
Pt sleeping in stretcher in no apparent signs of distress. Pt remains on cardiac monitor, PIV site WNL. Pt with no complains of pain or discomfort at this time. Refer to flowsheet and chart, pt ID band in place, pt safety maintained, pt hemodynamically stable, updated on plan of care. Awaiting clean bed. Call light provided, fall safety reinforced. Will continue to monitor

## 2019-11-16 NOTE — PROGRESS NOTE ADULT - SUBJECTIVE AND OBJECTIVE BOX
CC: cp    Patient seen and examined at the bedside. No acute overnight events. admitted yesterday. Complaining of nothing today. Denies fever/chills, headache, lightheadedness, dizziness, chest pain, palpitations, shortness of breath, cough, abd pain, nausea/vomiting/diarrhea, muscle pain.      =========================================================================================  T(C): 36.7 (11-16-19 @ 11:44), Max: 37.1 (11-15-19 @ 17:45)  HR: 83 (11-16-19 @ 11:44) (72 - 89)  BP: 121/72 (11-16-19 @ 11:44) (100/61 - 178/78)  RR: 18 (11-16-19 @ 11:44) (18 - 20)  SpO2: 98% (11-16-19 @ 11:44) (95% - 99%)    PHYSICAL EXAM.    GEN - appears age appropriate. well nourished. pleasant. no distress.   HEENT - NCAT, EOMI, MARCOS  RESP - CTA BL, no wheeze/stridor/rhonchi/crackles. not on supplemental O2. able to speak in full sentences without distress.   CARDIO - NS1S2, RRR. No murmurs/rubs/gallops.  ABD - Soft/Non tender/Non distended. Normal BS x4 quadrants. no guarding/rebound tenderness.  Ext - No GOIVANNI.  MSK - BL 5/5 strength on upper and lower extremities.   Neuro - AAOx3. cn 2-12 grossly intact  Psych - normal affect  Skin - c/d/i. no rashes/lesions      I&O's Summary    Daily     Daily     =========================================================================================  LABS.    11-16    135  |  101  |  17.0  ----------------------------<  197<H>  4.2   |  21.0<L>  |  0.90    Ca    8.4<L>      16 Nov 2019 05:37    TPro  6.9  /  Alb  3.9  /  TBili  0.9  /  DBili  x   /  AST  25  /  ALT  26  /  AlkPhos  129<H>  11-15                          14.5   6.65  )-----------( 156      ( 16 Nov 2019 15:54 )             42.3     LIVER FUNCTIONS - ( 15 Nov 2019 14:48 )  Alb: 3.9 g/dL / Pro: 6.9 g/dL / ALK PHOS: 129 U/L / ALT: 26 U/L / AST: 25 U/L / GGT: x           PT/INR - ( 15 Nov 2019 14:48 )   PT: 10.8 sec;   INR: 0.94 ratio         PTT - ( 16 Nov 2019 15:54 )  PTT:80.9 sec  CARDIAC MARKERS ( 16 Nov 2019 15:54 )  x     / 0.18 ng/mL / x     / x     / x      CARDIAC MARKERS ( 16 Nov 2019 05:37 )  x     / 0.08 ng/mL / 142 U/L / x     / x      CARDIAC MARKERS ( 15 Nov 2019 20:08 )  x     / <0.01 ng/mL / x     / x     / x      CARDIAC MARKERS ( 15 Nov 2019 14:48 )  x     / <0.01 ng/mL / x     / x     / x                =========================================================================================  IMAGING.     =========================================================================================    HOME MEDS.    Home Medications:  fenofibrate 145 mg oral tablet: 1 tab(s) orally once a day (15 Nov 2019 22:31)  Januvia 100 mg oral tablet: 1 tab(s) orally once a day (15 Nov 2019 22:31)  Lipitor 40 mg oral tablet: 1 tab(s) orally once a day (15 Nov 2019 22:31)  metFORMIN 1000 mg oral tablet, extended release: 1 tab(s) orally 2 times a day (15 Nov 2019 22:31)      =========================================================================================    HOSPITAL MEDS.    MEDICATIONS  (STANDING):  aspirin  chewable 81 milliGRAM(s) Oral daily  atorvastatin 80 milliGRAM(s) Oral at bedtime  dextrose 5%. 1000 milliLiter(s) (50 mL/Hr) IV Continuous <Continuous>  dextrose 50% Injectable 12.5 Gram(s) IV Push once  dextrose 50% Injectable 25 Gram(s) IV Push once  dextrose 50% Injectable 25 Gram(s) IV Push once  fenofibrate Tablet 145 milliGRAM(s) Oral daily  fluticasone propionate 50 MICROgram(s)/spray Nasal Spray 2 Spray(s) Both Nostrils every 12 hours  heparin  Infusion.  Unit(s)/Hr (10 mL/Hr) IV Continuous <Continuous>  insulin glargine Injectable (LANTUS) 12 Unit(s) SubCutaneous at bedtime  insulin lispro (HumaLOG) corrective regimen sliding scale   SubCutaneous Before meals and at bedtime  loratadine 10 milliGRAM(s) Oral daily  losartan 50 milliGRAM(s) Oral daily  metoprolol tartrate 25 milliGRAM(s) Oral two times a day  nicotine -   7 mG/24Hr(s) Patch 1 patch Transdermal daily    MEDICATIONS  (PRN):  acetaminophen   Tablet .. 650 milliGRAM(s) Oral every 6 hours PRN Temp greater or equal to 38C (100.4F), Mild Pain (1 - 3), Moderate Pain (4 - 6), Severe Pain (7 - 10)  dextrose 40% Gel 15 Gram(s) Oral once PRN Blood Glucose LESS THAN 70 milliGRAM(s)/deciliter  glucagon  Injectable 1 milliGRAM(s) IntraMuscular once PRN Glucose LESS THAN 70 milligrams/deciliter  guaiFENesin  milliGRAM(s) Oral every 12 hours PRN Cough  heparin  Injectable 5000 Unit(s) IV Push every 6 hours PRN For aPTT less than 40  nitroglycerin     SubLingual 0.4 milliGRAM(s) SubLingual every 5 minutes PRN Chest Pain

## 2019-11-17 LAB
APTT BLD: 67.9 SEC — HIGH (ref 27.5–36.3)
B-OH-BUTYR SERPL-SCNC: 0.5 MMOL/L — HIGH
GLUCOSE BLDC GLUCOMTR-MCNC: 179 MG/DL — HIGH (ref 70–99)
GLUCOSE BLDC GLUCOMTR-MCNC: 190 MG/DL — HIGH (ref 70–99)
GLUCOSE BLDC GLUCOMTR-MCNC: 190 MG/DL — HIGH (ref 70–99)
GLUCOSE BLDC GLUCOMTR-MCNC: 222 MG/DL — HIGH (ref 70–99)
HCV AB S/CO SERPL IA: 0.1 S/CO — SIGNIFICANT CHANGE UP (ref 0–0.99)
HCV AB SERPL-IMP: SIGNIFICANT CHANGE UP
TROPONIN T SERPL-MCNC: 0.26 NG/ML — HIGH (ref 0–0.06)

## 2019-11-17 PROCEDURE — 99232 SBSQ HOSP IP/OBS MODERATE 35: CPT

## 2019-11-17 PROCEDURE — 99233 SBSQ HOSP IP/OBS HIGH 50: CPT

## 2019-11-17 PROCEDURE — 93010 ELECTROCARDIOGRAM REPORT: CPT | Mod: 76

## 2019-11-17 RX ORDER — ASPIRIN/CALCIUM CARB/MAGNESIUM 324 MG
325 TABLET ORAL DAILY
Refills: 0 | Status: DISCONTINUED | OUTPATIENT
Start: 2019-11-17 | End: 2019-11-18

## 2019-11-17 RX ADMIN — Medication 81 MILLIGRAM(S): at 09:03

## 2019-11-17 RX ADMIN — LOSARTAN POTASSIUM 50 MILLIGRAM(S): 100 TABLET, FILM COATED ORAL at 05:39

## 2019-11-17 RX ADMIN — Medication 2 SPRAY(S): at 05:38

## 2019-11-17 RX ADMIN — LORATADINE 10 MILLIGRAM(S): 10 TABLET ORAL at 09:03

## 2019-11-17 RX ADMIN — Medication 2: at 17:44

## 2019-11-17 RX ADMIN — Medication 145 MILLIGRAM(S): at 09:03

## 2019-11-17 RX ADMIN — Medication 2: at 12:50

## 2019-11-17 RX ADMIN — INSULIN GLARGINE 12 UNIT(S): 100 INJECTION, SOLUTION SUBCUTANEOUS at 21:57

## 2019-11-17 RX ADMIN — Medication 650 MILLIGRAM(S): at 21:58

## 2019-11-17 RX ADMIN — Medication 650 MILLIGRAM(S): at 19:41

## 2019-11-17 RX ADMIN — CLOPIDOGREL BISULFATE 75 MILLIGRAM(S): 75 TABLET, FILM COATED ORAL at 09:03

## 2019-11-17 RX ADMIN — Medication 2 SPRAY(S): at 18:03

## 2019-11-17 RX ADMIN — Medication 4: at 21:57

## 2019-11-17 RX ADMIN — HEPARIN SODIUM 900 UNIT(S)/HR: 5000 INJECTION INTRAVENOUS; SUBCUTANEOUS at 07:02

## 2019-11-17 RX ADMIN — ATORVASTATIN CALCIUM 80 MILLIGRAM(S): 80 TABLET, FILM COATED ORAL at 21:57

## 2019-11-17 RX ADMIN — Medication 325 MILLIGRAM(S): at 11:33

## 2019-11-17 RX ADMIN — Medication 25 MILLIGRAM(S): at 05:40

## 2019-11-17 RX ADMIN — Medication 25 MILLIGRAM(S): at 18:03

## 2019-11-17 RX ADMIN — Medication 2: at 09:00

## 2019-11-17 NOTE — PROGRESS NOTE ADULT - SUBJECTIVE AND OBJECTIVE BOX
HEALTH ISSUES - PROBLEM Dx:    NSTEMI    INTERVAL HPI/ OVERNIGHT EVENTS:    comfortable lying in bed  offers no complaints  states he has not seen his cardiologist in 2 yrs and PMD in 1 yr and that he is noncomplaint with his medications    REVIEW OF SYSTEMS:    CP- sob- palpitations-     Vital Signs Last 24 Hrs  T(C): 36.9 (17 Nov 2019 10:10), Max: 37.1 (16 Nov 2019 18:27)  T(F): 98.5 (17 Nov 2019 10:10), Max: 98.7 (16 Nov 2019 18:27)  HR: 80 (17 Nov 2019 10:10) (75 - 81)  BP: 114/71 (17 Nov 2019 10:10) (114/71 - 143/66)  BP(mean): --  RR: 16 (17 Nov 2019 10:10) (16 - 16)  SpO2: 98% (17 Nov 2019 10:10) (97% - 98%)    PHYSICAL EXAM-  GENERAL: comfortable no distress, pain free  HEAD:  Atraumatic, Normocephalic  EYES: EOMI, conjunctiva and sclera clear  ENT: Moist mucous membranes, No lesions  NECK: Supple, Normal thyroid  NERVOUS SYSTEM:  Alert & Oriented X 3, Motor Strength 5/5 B/L upper and lower extremities  CHEST/LUNG: Clear to percussion bilaterally; No rales, rhonchi, wheezing, or rubs  HEART: Regular rate and rhythm; No murmurs, rubs, or gallops  ABDOMEN: Soft, Nontender, Nondistended; Bowel sounds present  EXTREMITIES:  2+ Peripheral Pulses, No clubbing, cyanosis, or edema      MEDICATIONS  (STANDING):  aspirin 325 milliGRAM(s) Oral daily  atorvastatin 80 milliGRAM(s) Oral at bedtime  clopidogrel Tablet 75 milliGRAM(s) Oral daily  dextrose 5%. 1000 milliLiter(s) (50 mL/Hr) IV Continuous <Continuous>  dextrose 50% Injectable 12.5 Gram(s) IV Push once  dextrose 50% Injectable 25 Gram(s) IV Push once  dextrose 50% Injectable 25 Gram(s) IV Push once  fenofibrate Tablet 145 milliGRAM(s) Oral daily  fluticasone propionate 50 MICROgram(s)/spray Nasal Spray 2 Spray(s) Both Nostrils every 12 hours  heparin  Infusion.  Unit(s)/Hr (10 mL/Hr) IV Continuous <Continuous>  insulin glargine Injectable (LANTUS) 12 Unit(s) SubCutaneous at bedtime  insulin lispro (HumaLOG) corrective regimen sliding scale   SubCutaneous Before meals and at bedtime  loratadine 10 milliGRAM(s) Oral daily  losartan 50 milliGRAM(s) Oral daily  metoprolol tartrate 25 milliGRAM(s) Oral two times a day  nicotine -   7 mG/24Hr(s) Patch 1 patch Transdermal daily    MEDICATIONS  (PRN):  acetaminophen   Tablet .. 650 milliGRAM(s) Oral every 6 hours PRN Temp greater or equal to 38C (100.4F), Mild Pain (1 - 3), Moderate Pain (4 - 6), Severe Pain (7 - 10)  dextrose 40% Gel 15 Gram(s) Oral once PRN Blood Glucose LESS THAN 70 milliGRAM(s)/deciliter  glucagon  Injectable 1 milliGRAM(s) IntraMuscular once PRN Glucose LESS THAN 70 milligrams/deciliter  guaiFENesin  milliGRAM(s) Oral every 12 hours PRN Cough  heparin  Injectable 5000 Unit(s) IV Push every 6 hours PRN For aPTT less than 40  nitroglycerin     SubLingual 0.4 milliGRAM(s) SubLingual every 5 minutes PRN Chest Pain      LABS:                        14.5   6.65  )-----------( 156      ( 16 Nov 2019 15:54 )             42.3     11-16    135  |  101  |  17.0  ----------------------------<  197<H>  4.2   |  21.0<L>  |  0.90    Ca    8.4<L>      16 Nov 2019 05:37    TPro  6.9  /  Alb  3.9  /  TBili  0.9  /  DBili  x   /  AST  25  /  ALT  26  /  AlkPhos  129<H>  11-15    PT/INR - ( 15 Nov 2019 14:48 )   PT: 10.8 sec;   INR: 0.94 ratio         PTT - ( 17 Nov 2019 06:22 )  PTT:67.9 sec

## 2019-11-17 NOTE — PROGRESS NOTE ADULT - ASSESSMENT
68 yo pleasant male with untreated HTN, HLD and diabetes, smoker who complains of intermittent chest discomfort. His chest discomfort is not pleuritic.   Heparin drip initiated as well as ACEs medications (statins, ACE, BB, Dapt: plavix 75mg and aspirin 81mg daily).        1. ACS - serial enzymes trending up x 4   2. CP is concerning for cardiac etiology. He will need ischemic evaluation.  Possible cath on Monday  3. BP stable 114/71 ct with BB and ACE.    4. Statin therapy  5. TTE pending

## 2019-11-17 NOTE — PROGRESS NOTE ADULT - ASSESSMENT
69yoM hx DM, HLD, medication non-compliance presenting with intermittent episodes of mid-sternal, non-radiating chest pain for the past few days being admitted for ischemic evaluation     NSTEMI  troponin uptrending  asa, bb, statin, heparin gtt, ARB  s/p plavix loading  cath in AM    DM2 uncontrolled  admits to noncompliance  insulin in house  titrate as needed  will likely need insulin at discharge    Dyslipidemia uncontrolled  on statins now in addition to fenofibrate    Noncompliance  counselling done    Rhinitis, likely viral, stable  -CXR negative for acute pathology   -supportive care, Flonase, Mucinex PRN    Hyponatremia, resolved    Tobacco Use, stable  -Low dose nicotine patch    Prophylactic measure  -Intermittent prophylactic measure , hep drip    Dispo. dc after cardiac w/u completed, likely tues/ wedn    outpt fu. pmd, ss cardio

## 2019-11-18 ENCOUNTER — TRANSCRIPTION ENCOUNTER (OUTPATIENT)
Age: 69
End: 2019-11-18

## 2019-11-18 LAB
ANION GAP SERPL CALC-SCNC: 13 MMOL/L — SIGNIFICANT CHANGE UP (ref 5–17)
APTT BLD: 66.7 SEC — HIGH (ref 27.5–36.3)
BUN SERPL-MCNC: 26 MG/DL — HIGH (ref 8–20)
CALCIUM SERPL-MCNC: 9.1 MG/DL — SIGNIFICANT CHANGE UP (ref 8.6–10.2)
CHLORIDE SERPL-SCNC: 101 MMOL/L — SIGNIFICANT CHANGE UP (ref 98–107)
CO2 SERPL-SCNC: 26 MMOL/L — SIGNIFICANT CHANGE UP (ref 22–29)
CREAT SERPL-MCNC: 1.16 MG/DL — SIGNIFICANT CHANGE UP (ref 0.5–1.3)
GLUCOSE BLDC GLUCOMTR-MCNC: 121 MG/DL — HIGH (ref 70–99)
GLUCOSE BLDC GLUCOMTR-MCNC: 124 MG/DL — HIGH (ref 70–99)
GLUCOSE BLDC GLUCOMTR-MCNC: 124 MG/DL — HIGH (ref 70–99)
GLUCOSE BLDC GLUCOMTR-MCNC: 155 MG/DL — HIGH (ref 70–99)
GLUCOSE SERPL-MCNC: 127 MG/DL — HIGH (ref 70–115)
HCT VFR BLD CALC: 43.2 % — SIGNIFICANT CHANGE UP (ref 39–50)
HGB BLD-MCNC: 14.6 G/DL — SIGNIFICANT CHANGE UP (ref 13–17)
INR BLD: 1 RATIO — SIGNIFICANT CHANGE UP (ref 0.88–1.16)
MCHC RBC-ENTMCNC: 29.4 PG — SIGNIFICANT CHANGE UP (ref 27–34)
MCHC RBC-ENTMCNC: 33.8 GM/DL — SIGNIFICANT CHANGE UP (ref 32–36)
MCV RBC AUTO: 87.1 FL — SIGNIFICANT CHANGE UP (ref 80–100)
PLATELET # BLD AUTO: 158 K/UL — SIGNIFICANT CHANGE UP (ref 150–400)
POTASSIUM SERPL-MCNC: 4.4 MMOL/L — SIGNIFICANT CHANGE UP (ref 3.5–5.3)
POTASSIUM SERPL-SCNC: 4.4 MMOL/L — SIGNIFICANT CHANGE UP (ref 3.5–5.3)
PROTHROM AB SERPL-ACNC: 11.5 SEC — SIGNIFICANT CHANGE UP (ref 10–12.9)
RBC # BLD: 4.96 M/UL — SIGNIFICANT CHANGE UP (ref 4.2–5.8)
RBC # FLD: 12.2 % — SIGNIFICANT CHANGE UP (ref 10.3–14.5)
SODIUM SERPL-SCNC: 140 MMOL/L — SIGNIFICANT CHANGE UP (ref 135–145)
TROPONIN T SERPL-MCNC: 0.33 NG/ML — HIGH (ref 0–0.06)
WBC # BLD: 6.17 K/UL — SIGNIFICANT CHANGE UP (ref 3.8–10.5)
WBC # FLD AUTO: 6.17 K/UL — SIGNIFICANT CHANGE UP (ref 3.8–10.5)

## 2019-11-18 PROCEDURE — 99232 SBSQ HOSP IP/OBS MODERATE 35: CPT | Mod: 25

## 2019-11-18 PROCEDURE — 93454 CORONARY ARTERY ANGIO S&I: CPT | Mod: 26,XU

## 2019-11-18 PROCEDURE — 99152 MOD SED SAME PHYS/QHP 5/>YRS: CPT

## 2019-11-18 PROCEDURE — 99232 SBSQ HOSP IP/OBS MODERATE 35: CPT

## 2019-11-18 PROCEDURE — 93010 ELECTROCARDIOGRAM REPORT: CPT

## 2019-11-18 PROCEDURE — 93306 TTE W/DOPPLER COMPLETE: CPT | Mod: 26

## 2019-11-18 PROCEDURE — 93571 IV DOP VEL&/PRESS C FLO 1ST: CPT | Mod: 26,LD

## 2019-11-18 PROCEDURE — 99233 SBSQ HOSP IP/OBS HIGH 50: CPT

## 2019-11-18 PROCEDURE — 92941 PRQ TRLML REVSC TOT OCCL AMI: CPT | Mod: RC

## 2019-11-18 PROCEDURE — 92978 ENDOLUMINL IVUS OCT C 1ST: CPT | Mod: 26,RC

## 2019-11-18 RX ORDER — ASPIRIN/CALCIUM CARB/MAGNESIUM 324 MG
81 TABLET ORAL DAILY
Refills: 0 | Status: DISCONTINUED | OUTPATIENT
Start: 2019-11-18 | End: 2019-11-21

## 2019-11-18 RX ADMIN — HEPARIN SODIUM 900 UNIT(S)/HR: 5000 INJECTION INTRAVENOUS; SUBCUTANEOUS at 06:53

## 2019-11-18 RX ADMIN — Medication 25 MILLIGRAM(S): at 05:32

## 2019-11-18 RX ADMIN — LORATADINE 10 MILLIGRAM(S): 10 TABLET ORAL at 11:41

## 2019-11-18 RX ADMIN — ATORVASTATIN CALCIUM 80 MILLIGRAM(S): 80 TABLET, FILM COATED ORAL at 21:28

## 2019-11-18 RX ADMIN — CLOPIDOGREL BISULFATE 75 MILLIGRAM(S): 75 TABLET, FILM COATED ORAL at 11:41

## 2019-11-18 RX ADMIN — Medication 2 SPRAY(S): at 05:32

## 2019-11-18 RX ADMIN — Medication 25 MILLIGRAM(S): at 19:06

## 2019-11-18 RX ADMIN — LOSARTAN POTASSIUM 50 MILLIGRAM(S): 100 TABLET, FILM COATED ORAL at 05:32

## 2019-11-18 RX ADMIN — INSULIN GLARGINE 12 UNIT(S): 100 INJECTION, SOLUTION SUBCUTANEOUS at 21:28

## 2019-11-18 RX ADMIN — Medication 325 MILLIGRAM(S): at 11:41

## 2019-11-18 RX ADMIN — Medication 145 MILLIGRAM(S): at 11:41

## 2019-11-18 RX ADMIN — Medication 2 SPRAY(S): at 19:06

## 2019-11-18 RX ADMIN — Medication 2: at 21:29

## 2019-11-18 NOTE — DISCHARGE NOTE PROVIDER - HOSPITAL COURSE
69 year old male who p/w chest pain.  S/P PCI with KOFI to mRCA and for staged procedure to LAD 69 year old male who p/w chest pain.  S/P PCI with KOFI to mRCA and for staged procedure to LAD    S/P KOFI to LAD    S/P POBA to D1    had 1 episode hypotension inc ath lab. ARB D/Madhu    going home on insulin    Medically stable and agreeable with discharge and follow up plan. Patient was advised to return to ED if any symptoms occur or worsen.    time 40 mins

## 2019-11-18 NOTE — PROGRESS NOTE ADULT - SUBJECTIVE AND OBJECTIVE BOX
Patient s/p PCI to RCA 99% - 1 KOFI.     Severe LAD/diagonal disease. Plan for PCI on wednesday. Femoral approach. Staged.     Continue aspirin, plavix.

## 2019-11-18 NOTE — DISCHARGE NOTE PROVIDER - NSDCFUADDINST_GEN_ALL_CORE_FT
Restricted use with no heavy lifting of affected arm for 48 hours.  No submerging the arm in water for 48 hours.  You may start showering today.  Call your doctor for any bleeding, swelling, loss of sensation in the hand or fingers, or fingers turning blue.  If heavy bleeding or large lumps form, hold pressure at the spot and come to the Emergency Room. Restricted use with no heavy lifting of affected arm for 48 hours.  No submerging the arm in water for 48 hours.  You may start showering today.  Call your doctor for any bleeding, swelling, loss of sensation in the hand or fingers, or fingers turning blue.  If heavy bleeding or large lumps form, hold pressure at the spot and come to the Emergency Room.  pt going home with insulin.

## 2019-11-18 NOTE — DISCHARGE NOTE PROVIDER - NSDCPNSUBOBJ_GEN_ALL_CORE
HEALTH ISSUES - PROBLEM Dx:        NSTEMI        INTERVAL HPI/ OVERNIGHT EVENTS:        s/p s/p PCI to RCA 99% - 1 KOFI.     S/P KOFI to LAD and POBA to D1- today        REVIEW OF SYSTEMS:        CP- sob- palpitations-             ICU Vital Signs Last 24 Hrs    T(C): 36.6 (21 Nov 2019 06:07), Max: 36.9 (20 Nov 2019 15:51)    T(F): 97.8 (21 Nov 2019 06:07), Max: 98.4 (20 Nov 2019 15:51)    HR: 75 (21 Nov 2019 06:07) (64 - 88)    BP: 108/50 (21 Nov 2019 06:07) (99/60 - 121/65)    BP(mean): --    ABP: --    ABP(mean): --    RR: 16 (21 Nov 2019 06:07) (16 - 18)    SpO2: 97% (21 Nov 2019 06:07) (96% - 99%)            PHYSICAL EXAM-    GENERAL: comfortable no distress, pain free    HEAD:  Atraumatic, Normocephalic    EYES: EOMI, conjunctiva and sclera clear    ENT: Moist mucous membranes, No lesions    NECK: Supple, Normal thyroid    NERVOUS SYSTEM:  Alert & Oriented X 3, Motor Strength 5/5 B/L upper and lower extremities    CHEST/LUNG: Clear to percussion bilaterally; No rales, rhonchi, wheezing, or rubs    HEART: Regular rate and rhythm; No murmurs, rubs, or gallops    ABDOMEN: Soft, Nontender, Nondistended; Bowel sounds present, Rt groin angioseal    EXTREMITIES:  2+ Peripheral Pulses, No clubbing, cyanosis, or edema        MEDICATIONS  (STANDING):    aspirin  chewable 81 milliGRAM(s) Oral daily    atorvastatin 80 milliGRAM(s) Oral at bedtime    clopidogrel Tablet 75 milliGRAM(s) Oral daily    dextrose 5%. 1000 milliLiter(s) (50 mL/Hr) IV Continuous <Continuous>    dextrose 50% Injectable 12.5 Gram(s) IV Push once    dextrose 50% Injectable 25 Gram(s) IV Push once    dextrose 50% Injectable 25 Gram(s) IV Push once    fenofibrate Tablet 145 milliGRAM(s) Oral daily    insulin glargine Injectable (LANTUS) 12 Unit(s) SubCutaneous at bedtime    insulin lispro (HumaLOG) corrective regimen sliding scale   SubCutaneous Before meals and at bedtime    loratadine 10 milliGRAM(s) Oral daily    metoprolol tartrate 25 milliGRAM(s) Oral two times a day    nicotine -   7 mG/24Hr(s) Patch 1 patch Transdermal daily        MEDICATIONS  (PRN):    acetaminophen   Tablet .. 650 milliGRAM(s) Oral every 6 hours PRN Temp greater or equal to 38C (100.4F), Mild Pain (1 - 3), Moderate Pain (4 - 6), Severe Pain (7 - 10)    dextrose 40% Gel 15 Gram(s) Oral once PRN Blood Glucose LESS THAN 70 milliGRAM(s)/deciliter    glucagon  Injectable 1 milliGRAM(s) IntraMuscular once PRN Glucose LESS THAN 70 milligrams/deciliter    guaiFENesin  milliGRAM(s) Oral every 12 hours PRN Cough    nitroglycerin     SubLingual 0.4 milliGRAM(s) SubLingual every 5 minutes PRN Chest Pain            LABS:                            14.4     6.51  )-----------( 164      ( 19 Nov 2019 06:18 )               45.1         11-19        138  |  99  |  28.0<H>    ----------------------------<  135<H>    4.4   |  26.0  |  1.11        Ca    9.0      19 Nov 2019 06:18    Mg     2.2     11-19            PTT - ( 19 Nov 2019 06:18 )  PTT:30.2 sec                Assessment and Plan:     · Assessment	    69yoM hx DM, HLD, medication non-compliance presenting with intermittent episodes of mid-sternal, non-radiating chest pain for the past few days being admitted for ischemic evaluation         NSTEMI    s/p s/p PCI to RCA 99% - 1 KOFI.     S/P KOFI to LAD and POBA to D1    asa, bb, statin, heparin gtt, ARB    s/p plavix loading        Hypotension to SBP 80s in cathlab    pt had taken all his AM meds    ARB was started to control HTN    now being d/padmaja per cardiology sec to hypotensive episode        DM2 uncontrolled    admits to noncompliance    insulin in house    titrate as needed    Lantus/ metformin 48 hrs later and Farxiga at discharge        Dyslipidemia uncontrolled    on statins now in addition to fenofibrate        Noncompliance    counselling done        Rhinitis, likely viral, stable    CXR negative for acute pathology     supportive care, Flonase, Mucinex PRN        Hyponatremia, resolved        Tobacco Use, stable    Low dose nicotine patch

## 2019-11-18 NOTE — PROGRESS NOTE ADULT - ASSESSMENT
This is a 68 y/o male with untreated HTN, HLD and diabetes, smoker who complains of intermittent chest discomfort. His chest discomfort is not pleuritic. Heparin drip initiated as well as ACEs medications (statins, ACE, BB, Dapt: plavix 75mg and aspirin 81mg daily).  CP is concerning for cardiac etiology. He will need ischemic evaluation. [facility] today, echo showed EF 60-65% w/ grade 1 diastolic dysfunction.     Indication for Cath: CCS4 unstable angina   ASA 2  Mallampati 2  EF 60-65%   GFR 64  PCI Bleed risk: 1.1%

## 2019-11-18 NOTE — PROGRESS NOTE ADULT - SUBJECTIVE AND OBJECTIVE BOX
[place] [facility]                          [address] [place] [address]                       Phone: [phone]. Fax:[phone]                      ________________________________________________           Reason for follow up/Overnight events: follow up     HPI:  69yoM hx DM, HLD, medication non-compliance presenting with intermittent episodes of mid-sternal, non-radiating chest pain for the past few days.  Reports sensation of chest pressure that occurs both at rest and during exertion that typically last for up to 20 minutes.  Denies any associated dyspnea, palpitations, diaphoresis, nausea or vomiting.  Pt has never had any prior exercise testing or cardiac caths.  Of note, pt also having a 2-3 week history of non-productive cough associated with post nasal drip.  Denies any fevers or chills.  On admission, EKG showed NSR, HR 91 with RBBB which was also seen on 2018 EKG.  Troponin negative x 2.  CXR was negative for acute pathology.  Pt was initially admitted to CDU in ED, was seen by cardiology while there who advised medicine admission for ischemic evaluation. (15 Nov 2019 20:14)      ROS: All review of systems negative unless indicated otherwise below.                          LAB RESULTS                   COMPLETE BLOOD COUNT( 2019 06:31 )                            14.6 g/dL  6.17 K/uL )---------------( 158 K/uL                        43.2 %      Automated Differential     Auto Basophil # - X      Auto Basophil % - X      Auto Eosinophil # - X      Auto Eosinophil % - X      Auto Immature Granulocyte # - X      Auto Immature Granulocyte % - X      Auto Lymphocyte # - X      Auto Lymphocyte % - X      Auto Monocyte # - X      Auto Monocyte % - X      Auto Neutrophil # - X      Auto Neutrophil % - X                                      CHEMISTRY                 Basic Metabolic Panel (19 @ 06:31)    140  |  101  |  26.0<H>  ----------------------------<  127<H>  4.4   |  26.0  |  1.16    Ca    9.1      2019 06:31                    Liver Functions (11-15-19 @ 14:48))  TPro  6.9  /  Alb  3.9  /  TBili  0.9  /  DBili  x   /  AST  25  /  ALT  26  /  AlkPhos  129     PT/INR/PTT ( 2019 06:31 )                        :                       :      11.5         :       66.7                  .        .                   .              .           .       1.00        .                                                                   Cardiac Enzymes   ( 2019 06:31 )  Troponin T  0.33<H>,  CPK  X    , CKMB  X    , BNP X        , ( 2019 06:19 )  Troponin T  0.26<H>,  CPK  X    , CKMB  X    , BNP X        , ( 2019 20:58 )  Troponin T  0.20<H>,  CPK  X    , CKMB  X    , BNP X                              RADIOLOGY RESULTS: Personally visualized   < from: Xray Chest 2 Views PA/Lat (11.15.19 @ 15:28) >  IMPRESSION:    No acute cardiopulmonary disease process.    < end of copied text >                          CARDIOLOGY RESULTS: Official Report/Preliminary Verbal Reports  ECHO:   < from: TTE Echo w/Cont Complete (19 @ 09:42) >  Summary:   1. Technically good study.   2. Normal global left ventricular systolic function.   3. Left ventricular ejection fraction, by visual estimation, is 60 to   65%.   4. Spectral Doppler shows impaired relaxation pattern of left   ventricular myocardial filling (Grade I diastolic dysfunction).   5. There is no evidence of pericardial effusion.   6. No significant valvular heart disease.    MD Mark Electronically signed on 2019 at 10:51:27 AM    < end of copied text >                          CARDIOLOGY REVIEW: Personally visualized and reviewed  Telemetry Last 24h: SR/ST                              DAILY WEIGHTS - 48 HOUR TREND     Daily Weight in k.1 (2019 05:08), Weight in k.3 (2019 04:38)    HOME MEDICATIONS:  fenofibrate 145 mg oral tablet: 1 tab(s) orally once a day (15 Nov 2019 22:31)  Januvia 100 mg oral tablet: 1 tab(s) orally once a day (15 Nov 2019 22:31)  Lipitor 40 mg oral tablet: 1 tab(s) orally once a day (15 Nov 2019 22:31)  metFORMIN 1000 mg oral tablet, extended release: 1 tab(s) orally 2 times a day (15 Nov 2019 22:31)                             Current Admission Active Medications    acetaminophen   Tablet .. 650 milliGRAM(s) Oral every 6 hours PRN Temp greater or equal to 38C (100.4F), Mild Pain (1 - 3), Moderate Pain (4 - 6), Severe Pain (7 - 10)  aspirin 325 milliGRAM(s) Oral daily  atorvastatin 80 milliGRAM(s) Oral at bedtime  clopidogrel Tablet 75 milliGRAM(s) Oral daily  dextrose 40% Gel 15 Gram(s) Oral once PRN Blood Glucose LESS THAN 70 milliGRAM(s)/deciliter  dextrose 5%. 1000 milliLiter(s) (50 mL/Hr) IV Continuous <Continuous>  dextrose 50% Injectable 12.5 Gram(s) IV Push once  dextrose 50% Injectable 25 Gram(s) IV Push once  dextrose 50% Injectable 25 Gram(s) IV Push once  fenofibrate Tablet 145 milliGRAM(s) Oral daily  fluticasone propionate 50 MICROgram(s)/spray Nasal Spray 2 Spray(s) Both Nostrils every 12 hours  glucagon  Injectable 1 milliGRAM(s) IntraMuscular once PRN Glucose LESS THAN 70 milligrams/deciliter  guaiFENesin  milliGRAM(s) Oral every 12 hours PRN Cough  heparin  Infusion.  Unit(s)/Hr (10 mL/Hr) IV Continuous <Continuous>  heparin  Injectable 5000 Unit(s) IV Push every 6 hours PRN For aPTT less than 40  insulin glargine Injectable (LANTUS) 12 Unit(s) SubCutaneous at bedtime  insulin lispro (HumaLOG) corrective regimen sliding scale   SubCutaneous Before meals and at bedtime  loratadine 10 milliGRAM(s) Oral daily  losartan 50 milliGRAM(s) Oral daily  metoprolol tartrate 25 milliGRAM(s) Oral two times a day  nicotine -   7 mG/24Hr(s) Patch 1 patch Transdermal daily  nitroglycerin     SubLingual 0.4 milliGRAM(s) SubLingual every 5 minutes PRN Chest Pain                        PHYSICAL EXAM:    Vital Signs Last 24 Hrs  T(C): 36.4 (2019 10:38), Max: 36.8 (2019 15:32)  T(F): 97.6 (2019 10:38), Max: 98.3 (2019 15:32)  HR: 72 (2019 10:38) (66 - 87)  BP: 122/74 (2019 10:38) (114/71 - 142/62)  BP(mean): --  RR: 16 (2019 10:38) (16 - 18)  SpO2: 97% (2019 05:31) (97% - 98%)    GENERAL: NAD  NECK: Supple, No JVD  NERVOUS SYSTEM:  Alert & Oriented X3, non focal neuro exam.   CHEST/LUNG: clear lungs, No rales, rhonchi, wheezing, or rubs  HEART: Regular rate and rhythm; s1 and s2 auscultated, No murmurs, rubs, or gallops  ABDOMEN: Soft, Nontender, Nondistended; Bowel sounds present and normoactive.   EXTREMITIES:  2+ Peripheral Pulses, No clubbing, cyanosis, or edema

## 2019-11-18 NOTE — PROGRESS NOTE ADULT - PROBLEM SELECTOR PLAN 1
Unstable angina  troponin positive now 0.33  history w/ risk factors for CAD  continue DAPT/heparin infusion, lipitor, tricor, metoprolol, losartan   LHC today   Diet/lifestyle modifications and medication compliance heavily reinforced   smoking cessation reinforced   Does not meet criteria for GFR protocol

## 2019-11-18 NOTE — DISCHARGE NOTE PROVIDER - NSDCMRMEDTOKEN_GEN_ALL_CORE_FT
fenofibrate 145 mg oral tablet: 1 tab(s) orally once a day  Januvia 100 mg oral tablet: 1 tab(s) orally once a day  Lipitor 40 mg oral tablet: 1 tab(s) orally once a day  metFORMIN 1000 mg oral tablet, extended release: 1 tab(s) orally 2 times a day aspirin 81 mg oral tablet, chewable: 1 tab(s) orally once a day  clopidogrel 75 mg oral tablet: 1 tab(s) orally once a day  DMS: Solostar Mikayla Needles 4mm x 32g Dispense #50    Farxiga 5 mg oral tablet: 1 tab(s) orally once a day   fenofibrate 145 mg oral tablet: 1 tab(s) orally once a day  guaiFENesin 600 mg oral tablet, extended release: 1 tab(s) orally every 12 hours, As needed, Cough  Lantus Solostar Pen 100 units/mL subcutaneous solution: 12 unit(s) subcutaneous once a day   Lipitor 40 mg oral tablet: 1 tab(s) orally once a day  metFORMIN 1000 mg oral tablet, extended release: 1 tab(s) orally 2 times a day  metoprolol tartrate 25 mg oral tablet: 1 tab(s) orally 2 times a day

## 2019-11-18 NOTE — DISCHARGE NOTE PROVIDER - NSDCCPCAREPLAN_GEN_ALL_CORE_FT
PRINCIPAL DISCHARGE DIAGNOSIS  Diagnosis: ACS (acute coronary syndrome)  Assessment and Plan of Treatment:       SECONDARY DISCHARGE DIAGNOSES  Diagnosis: HTN (hypertension)  Assessment and Plan of Treatment:     Diagnosis: HLD (hyperlipidemia)  Assessment and Plan of Treatment:     Diagnosis: DM (diabetes mellitus)  Assessment and Plan of Treatment:

## 2019-11-18 NOTE — DISCHARGE NOTE PROVIDER - CARE PROVIDER_API CALL
Clifton Katz)  Cardiovascular Disease; Interventional Cardiology  [address] [facility], [address] [place] [address]  Phone: [phone]  Fax: [phone]  Follow Up Time:

## 2019-11-18 NOTE — PROGRESS NOTE ADULT - SUBJECTIVE AND OBJECTIVE BOX
Cardiology Nurse Practitioner Note  Pre cardiac cath    69 year old male with h/o DM, HTN, HLD, smoker who p/w c/o chest pain and positive trops.  For [facility] to assess coronary arteries.    VSS  Neuro: A&O X3  Lungs: CTA  CV: RRR  Ext: + palp pulses    Indication for Cath: CCS4 unstable angina   ASA 2  Mallampati 2  EF 60-65%   GFR 64  PCI Bleed risk: 1.1%    Labs/chart reviewed  Pt cleared for procedure

## 2019-11-18 NOTE — PROGRESS NOTE ADULT - SUBJECTIVE AND OBJECTIVE BOX
Nurse Practitioner Progress note:     INTERVAL HISTORY: 69 year old male with c/o chest pain and positive trops    MEDICATIONS:  losartan 50 milliGRAM(s) Oral daily  metoprolol tartrate 25 milliGRAM(s) Oral two times a day  nitroglycerin     SubLingual 0.4 milliGRAM(s) SubLingual every 5 minutes PRN  guaiFENesin  milliGRAM(s) Oral every 12 hours PRN  loratadine 10 milliGRAM(s) Oral daily  acetaminophen   Tablet .. 650 milliGRAM(s) Oral every 6 hours PRN  atorvastatin 80 milliGRAM(s) Oral at bedtime  dextrose 40% Gel 15 Gram(s) Oral once PRN  dextrose 50% Injectable 12.5 Gram(s) IV Push once  dextrose 50% Injectable 25 Gram(s) IV Push once  dextrose 50% Injectable 25 Gram(s) IV Push once  fenofibrate Tablet 145 milliGRAM(s) Oral daily  glucagon  Injectable 1 milliGRAM(s) IntraMuscular once PRN  insulin glargine Injectable (LANTUS) 12 Unit(s) SubCutaneous at bedtime  insulin lispro (HumaLOG) corrective regimen sliding scale   SubCutaneous Before meals and at bedtime  aspirin  chewable 81 milliGRAM(s) Oral daily  clopidogrel Tablet 75 milliGRAM(s) Oral daily  dextrose 5%. 1000 milliLiter(s) IV Continuous <Continuous>  fluticasone propionate 50 MICROgram(s)/spray Nasal Spray 2 Spray(s) Both Nostrils every 12 hours      TELEMETRY: NSR     T(C): 36.6 (11-18-19 @ 12:18), Max: 36.8 (11-17-19 @ 21:44)  HR: 84 (11-18-19 @ 16:55) (66 - 87)  BP: 112/69 (11-18-19 @ 16:55) (107/59 - 142/62)  RR: 16 (11-18-19 @ 16:55) (15 - 18)  SpO2: 98% (11-18-19 @ 16:55) (96% - 98%)  Wt(kg): --    PHYSICAL EXAM:  Appearance: Normal	  Cardiovascular: Normal S1 S2, No JVD, No murmurs, No edema  Respiratory: Lungs clear to auscultation	  Psychiatry: A & O x 3, Mood & affect appropriate  Neurologic: Non-focal, A&O X3.  No neuro deficits  Procedure Site: Right radial band in place.  Site benign.  No bleeding/hematoma/ecchymosis.  + palp radial pulse    12 lead EKG:  	NSR 84 bpm. No acute changes    PROCEDURE RESULTS: S/P PCI with KOFI X1 mRCA.  Pt with LAD and will return on wednesday for staged procedure.      ASSESSMENT/PLAN: 	  -Radia precautions  -D/C radial band in 2 hours  -d/c heparin drip  -decrease asa to 81 mg daily  -Resume  meds (reviewed with Dr. Katz)  -Follow up with Dr. Katz  -Check labs/EKG/site check in AM  -Please call with questions/concerns  -npo after MN on tuesday for cath on wednesday

## 2019-11-18 NOTE — PROGRESS NOTE ADULT - SUBJECTIVE AND OBJECTIVE BOX
HEALTH ISSUES - PROBLEM Dx:    NSTEMI    INTERVAL HPI/ OVERNIGHT EVENTS:    awaiting cardiac cath today    REVIEW OF SYSTEMS:    CP- sob- palpitations-     Vital Signs Last 24 Hrs  T(C): 36.6 (18 Nov 2019 12:18), Max: 36.8 (17 Nov 2019 15:32)  T(F): 97.8 (18 Nov 2019 12:18), Max: 98.3 (17 Nov 2019 15:32)  HR: 72 (18 Nov 2019 12:18) (66 - 87)  BP: 107/59 (18 Nov 2019 12:18) (107/59 - 142/62)  BP(mean): --  RR: 17 (18 Nov 2019 12:18) (16 - 18)  SpO2: 96% (18 Nov 2019 12:18) (96% - 98%)    PHYSICAL EXAM-  GENERAL: comfortable no distress, pain free  HEAD:  Atraumatic, Normocephalic  EYES: EOMI, conjunctiva and sclera clear  ENT: Moist mucous membranes, No lesions  NECK: Supple, Normal thyroid  NERVOUS SYSTEM:  Alert & Oriented X 3, Motor Strength 5/5 B/L upper and lower extremities  CHEST/LUNG: Clear to percussion bilaterally; No rales, rhonchi, wheezing, or rubs  HEART: Regular rate and rhythm; No murmurs, rubs, or gallops  ABDOMEN: Soft, Nontender, Nondistended; Bowel sounds present  EXTREMITIES:  2+ Peripheral Pulses, No clubbing, cyanosis, or edema      MEDICATIONS  (STANDING):  aspirin 325 milliGRAM(s) Oral daily  atorvastatin 80 milliGRAM(s) Oral at bedtime  clopidogrel Tablet 75 milliGRAM(s) Oral daily  dextrose 5%. 1000 milliLiter(s) (50 mL/Hr) IV Continuous <Continuous>  dextrose 50% Injectable 12.5 Gram(s) IV Push once  dextrose 50% Injectable 25 Gram(s) IV Push once  dextrose 50% Injectable 25 Gram(s) IV Push once  fenofibrate Tablet 145 milliGRAM(s) Oral daily  fluticasone propionate 50 MICROgram(s)/spray Nasal Spray 2 Spray(s) Both Nostrils every 12 hours  heparin  Infusion.  Unit(s)/Hr (10 mL/Hr) IV Continuous <Continuous>  insulin glargine Injectable (LANTUS) 12 Unit(s) SubCutaneous at bedtime  insulin lispro (HumaLOG) corrective regimen sliding scale   SubCutaneous Before meals and at bedtime  loratadine 10 milliGRAM(s) Oral daily  losartan 50 milliGRAM(s) Oral daily  metoprolol tartrate 25 milliGRAM(s) Oral two times a day  nicotine -   7 mG/24Hr(s) Patch 1 patch Transdermal daily    MEDICATIONS  (PRN):  acetaminophen   Tablet .. 650 milliGRAM(s) Oral every 6 hours PRN Temp greater or equal to 38C (100.4F), Mild Pain (1 - 3), Moderate Pain (4 - 6), Severe Pain (7 - 10)  dextrose 40% Gel 15 Gram(s) Oral once PRN Blood Glucose LESS THAN 70 milliGRAM(s)/deciliter  glucagon  Injectable 1 milliGRAM(s) IntraMuscular once PRN Glucose LESS THAN 70 milligrams/deciliter  guaiFENesin  milliGRAM(s) Oral every 12 hours PRN Cough  heparin  Injectable 5000 Unit(s) IV Push every 6 hours PRN For aPTT less than 40  nitroglycerin     SubLingual 0.4 milliGRAM(s) SubLingual every 5 minutes PRN Chest Pain      LABS:                        14.6   6.17  )-----------( 158      ( 18 Nov 2019 06:31 )             43.2     11-18    140  |  101  |  26.0<H>  ----------------------------<  127<H>  4.4   |  26.0  |  1.16    Ca    9.1      18 Nov 2019 06:31      PT/INR - ( 18 Nov 2019 06:31 )   PT: 11.5 sec;   INR: 1.00 ratio         PTT - ( 18 Nov 2019 06:31 )  PTT:66.7 sec

## 2019-11-18 NOTE — PROGRESS NOTE ADULT - ASSESSMENT
69yoM hx DM, HLD, medication non-compliance presenting with intermittent episodes of mid-sternal, non-radiating chest pain for the past few days being admitted for ischemic evaluation     NSTEMI  troponin uptrending  asa, bb, statin, heparin gtt, ARB  s/p plavix loading  cath today    DM2 uncontrolled  admits to noncompliance  insulin in house  titrate as needed  will likely need insulin at discharge    Dyslipidemia uncontrolled  on statins now in addition to fenofibrate    Noncompliance  counselling done    Rhinitis, likely viral, stable  -CXR negative for acute pathology   -supportive care, Flonase, Mucinex PRN    Hyponatremia, resolved    Tobacco Use, stable  -Low dose nicotine patch    Prophylactic measure  -Intermittent prophylactic measure, hep drip    Dispo. d/c after cardiac w/u completed, likely tues/ wed    outpt fu. pmd, ss cardio

## 2019-11-19 LAB
ANION GAP SERPL CALC-SCNC: 13 MMOL/L — SIGNIFICANT CHANGE UP (ref 5–17)
APTT BLD: 30.2 SEC — SIGNIFICANT CHANGE UP (ref 27.5–36.3)
BUN SERPL-MCNC: 28 MG/DL — HIGH (ref 8–20)
CALCIUM SERPL-MCNC: 9 MG/DL — SIGNIFICANT CHANGE UP (ref 8.6–10.2)
CHLORIDE SERPL-SCNC: 99 MMOL/L — SIGNIFICANT CHANGE UP (ref 98–107)
CO2 SERPL-SCNC: 26 MMOL/L — SIGNIFICANT CHANGE UP (ref 22–29)
CREAT SERPL-MCNC: 1.11 MG/DL — SIGNIFICANT CHANGE UP (ref 0.5–1.3)
GLUCOSE BLDC GLUCOMTR-MCNC: 134 MG/DL — HIGH (ref 70–99)
GLUCOSE BLDC GLUCOMTR-MCNC: 154 MG/DL — HIGH (ref 70–99)
GLUCOSE BLDC GLUCOMTR-MCNC: 179 MG/DL — HIGH (ref 70–99)
GLUCOSE BLDC GLUCOMTR-MCNC: 186 MG/DL — HIGH (ref 70–99)
GLUCOSE SERPL-MCNC: 135 MG/DL — HIGH (ref 70–115)
HCT VFR BLD CALC: 45.1 % — SIGNIFICANT CHANGE UP (ref 39–50)
HGB BLD-MCNC: 14.4 G/DL — SIGNIFICANT CHANGE UP (ref 13–17)
MAGNESIUM SERPL-MCNC: 2.2 MG/DL — SIGNIFICANT CHANGE UP (ref 1.8–2.6)
MCHC RBC-ENTMCNC: 28.5 PG — SIGNIFICANT CHANGE UP (ref 27–34)
MCHC RBC-ENTMCNC: 31.9 GM/DL — LOW (ref 32–36)
MCV RBC AUTO: 89.1 FL — SIGNIFICANT CHANGE UP (ref 80–100)
PLATELET # BLD AUTO: 164 K/UL — SIGNIFICANT CHANGE UP (ref 150–400)
POTASSIUM SERPL-MCNC: 4.4 MMOL/L — SIGNIFICANT CHANGE UP (ref 3.5–5.3)
POTASSIUM SERPL-SCNC: 4.4 MMOL/L — SIGNIFICANT CHANGE UP (ref 3.5–5.3)
RBC # BLD: 5.06 M/UL — SIGNIFICANT CHANGE UP (ref 4.2–5.8)
RBC # FLD: 12.4 % — SIGNIFICANT CHANGE UP (ref 10.3–14.5)
SODIUM SERPL-SCNC: 138 MMOL/L — SIGNIFICANT CHANGE UP (ref 135–145)
WBC # BLD: 6.51 K/UL — SIGNIFICANT CHANGE UP (ref 3.8–10.5)
WBC # FLD AUTO: 6.51 K/UL — SIGNIFICANT CHANGE UP (ref 3.8–10.5)

## 2019-11-19 PROCEDURE — 99232 SBSQ HOSP IP/OBS MODERATE 35: CPT

## 2019-11-19 PROCEDURE — 93010 ELECTROCARDIOGRAM REPORT: CPT

## 2019-11-19 RX ADMIN — CLOPIDOGREL BISULFATE 75 MILLIGRAM(S): 75 TABLET, FILM COATED ORAL at 08:32

## 2019-11-19 RX ADMIN — LORATADINE 10 MILLIGRAM(S): 10 TABLET ORAL at 08:33

## 2019-11-19 RX ADMIN — Medication 25 MILLIGRAM(S): at 17:29

## 2019-11-19 RX ADMIN — Medication 2: at 22:01

## 2019-11-19 RX ADMIN — ATORVASTATIN CALCIUM 80 MILLIGRAM(S): 80 TABLET, FILM COATED ORAL at 22:01

## 2019-11-19 RX ADMIN — Medication 2: at 08:30

## 2019-11-19 RX ADMIN — LOSARTAN POTASSIUM 50 MILLIGRAM(S): 100 TABLET, FILM COATED ORAL at 05:48

## 2019-11-19 RX ADMIN — Medication 81 MILLIGRAM(S): at 08:33

## 2019-11-19 RX ADMIN — Medication 145 MILLIGRAM(S): at 08:33

## 2019-11-19 RX ADMIN — Medication 2: at 17:27

## 2019-11-19 RX ADMIN — Medication 25 MILLIGRAM(S): at 05:48

## 2019-11-19 RX ADMIN — INSULIN GLARGINE 12 UNIT(S): 100 INJECTION, SOLUTION SUBCUTANEOUS at 22:01

## 2019-11-19 NOTE — ADVANCED PRACTICE NURSE CONSULT - RECOMMEDATIONS
continue diabetes self management education  insulin pen teaching  pt to inject all insulin doses while in the hospital using prefilled insulin syringe and rn supervision  cc- pls task pt to diabetes wellness out pt continue diabetes self management education  insulin pen teaching  pt to inject all insulin doses while in the hospital using prefilled insulin syringe and rn supervision  pls consider dc pt on metformin 48 hr after the last cath, lantus solostar insulin pen farxiga.   cc- pls task pt to diabetes wellness out pt

## 2019-11-19 NOTE — PROGRESS NOTE ADULT - ASSESSMENT
69yoM hx DM, HLD, medication non-compliance presenting with intermittent episodes of mid-sternal, non-radiating chest pain for the past few days being admitted for ischemic evaluation     NSTEMI  s/p s/p PCI to RCA 99% - 1 KOFI.   Severe LAD/diagonal disease. Plan for staged PCI on Wednesday.  asa, bb, statin, heparin gtt, ARB  s/p plavix loading    DM2 uncontrolled  admits to noncompliance  insulin in house  titrate as needed  will likely need insulin at discharge    Dyslipidemia uncontrolled  on statins now in addition to fenofibrate    Noncompliance  counselling done    Rhinitis, likely viral, stable  CXR negative for acute pathology   supportive care, Flonase, Mucinex PRN    Hyponatremia, resolved    Tobacco Use, stable  Low dose nicotine patch    Prophylactic measure  Intermittent prophylactic measure, hep drip    Dispo. post staged cath

## 2019-11-19 NOTE — PROGRESS NOTE ADULT - ASSESSMENT
70 yo pleasant male with untreated HTN, HLD and diabetes, smoker who complains of intermittent chest discomfort. His chest discomfort is not pleuritic.   Heparin drip initiated as well as ACEs medications (statins, ACE, BB, Dapt: Plavix 75mg and aspirin 81mg daily).   Had a C yesterdays  S/P PCI with KOFI X1 mRCA.  Pt with LAD and will return on Wednesday for staged procedure.    CAD S/P cardiac cath and PCI.    Radial precautions  -Heparin drip d.c in cath lab.    -decrease asa to 81 mg daily and add Plavix 75mg po daily  -ct statin, diabetic medications, ARB, and BB.    -Follow up with Dr. Kong  -NPO past midnight for staged PCI on Monday.    -Radial precautions  No lifting > 10 pounds, no bathing, no driving x 3 days post cath.

## 2019-11-19 NOTE — PROGRESS NOTE ADULT - SUBJECTIVE AND OBJECTIVE BOX
HEALTH ISSUES - PROBLEM Dx:    NSTEMI    INTERVAL HPI/ OVERNIGHT EVENTS:    s/p s/p PCI to RCA 99% - 1 KOFI.   Severe LAD/diagonal disease. Plan for staged PCI on wednesday.    REVIEW OF SYSTEMS:    CP- sob- palpitations-     Vital Signs Last 24 Hrs  T(C): 37.1 (19 Nov 2019 09:51), Max: 37.1 (19 Nov 2019 09:51)  T(F): 98.8 (19 Nov 2019 09:51), Max: 98.8 (19 Nov 2019 09:51)  HR: 76 (19 Nov 2019 09:51) (76 - 97)  BP: 97/63 (19 Nov 2019 09:51) (97/63 - 145/71)  BP(mean): --  RR: 18 (19 Nov 2019 09:51) (15 - 18)  SpO2: 98% (19 Nov 2019 05:43) (97% - 99%)    PHYSICAL EXAM-  GENERAL: comfortable no distress, pain free  HEAD:  Atraumatic, Normocephalic  EYES: EOMI, conjunctiva and sclera clear  ENT: Moist mucous membranes, No lesions  NECK: Supple, Normal thyroid  NERVOUS SYSTEM:  Alert & Oriented X 3, Motor Strength 5/5 B/L upper and lower extremities  CHEST/LUNG: Clear to percussion bilaterally; No rales, rhonchi, wheezing, or rubs  HEART: Regular rate and rhythm; No murmurs, rubs, or gallops  ABDOMEN: Soft, Nontender, Nondistended; Bowel sounds present  EXTREMITIES:  2+ Peripheral Pulses, No clubbing, cyanosis, or edema    MEDICATIONS  (STANDING):  aspirin  chewable 81 milliGRAM(s) Oral daily  atorvastatin 80 milliGRAM(s) Oral at bedtime  clopidogrel Tablet 75 milliGRAM(s) Oral daily  dextrose 5%. 1000 milliLiter(s) (50 mL/Hr) IV Continuous <Continuous>  dextrose 50% Injectable 12.5 Gram(s) IV Push once  dextrose 50% Injectable 25 Gram(s) IV Push once  dextrose 50% Injectable 25 Gram(s) IV Push once  fenofibrate Tablet 145 milliGRAM(s) Oral daily  insulin glargine Injectable (LANTUS) 12 Unit(s) SubCutaneous at bedtime  insulin lispro (HumaLOG) corrective regimen sliding scale   SubCutaneous Before meals and at bedtime  loratadine 10 milliGRAM(s) Oral daily  losartan 50 milliGRAM(s) Oral daily  metoprolol tartrate 25 milliGRAM(s) Oral two times a day  nicotine -   7 mG/24Hr(s) Patch 1 patch Transdermal daily    MEDICATIONS  (PRN):  acetaminophen   Tablet .. 650 milliGRAM(s) Oral every 6 hours PRN Temp greater or equal to 38C (100.4F), Mild Pain (1 - 3), Moderate Pain (4 - 6), Severe Pain (7 - 10)  dextrose 40% Gel 15 Gram(s) Oral once PRN Blood Glucose LESS THAN 70 milliGRAM(s)/deciliter  glucagon  Injectable 1 milliGRAM(s) IntraMuscular once PRN Glucose LESS THAN 70 milligrams/deciliter  guaiFENesin  milliGRAM(s) Oral every 12 hours PRN Cough  nitroglycerin     SubLingual 0.4 milliGRAM(s) SubLingual every 5 minutes PRN Chest Pain      LABS:                        14.4   6.51  )-----------( 164      ( 19 Nov 2019 06:18 )             45.1     11-19    138  |  99  |  28.0<H>  ----------------------------<  135<H>  4.4   |  26.0  |  1.11    Ca    9.0      19 Nov 2019 06:18  Mg     2.2     11-19      PT/INR - ( 18 Nov 2019 06:31 )   PT: 11.5 sec;   INR: 1.00 ratio         PTT - ( 19 Nov 2019 06:18 )  PTT:30.2 sec

## 2019-11-19 NOTE — PROGRESS NOTE ADULT - SUBJECTIVE AND OBJECTIVE BOX
[facility]                                                        Office: [address] [address]                                                       Telephone: [phone]. Fax:[phone]                                                                             PROGRESS NOTE   Reason for follow up:   Chest pain                            Overnight: No new events.   Update:   PCI to RCA Yesterday.   Plan for staged LAD/diagonal.      Subjective: "Chest pain is better today "   Complains of:   Review of symptoms: Cardiac:  No chest pain. No dyspnea. No palpitations.  Respiratory: no cough. No dyspnea  Gastrointestinal:    No abdominal pain. No bleeding.     Past medical history: No updates.   Chronic conditions:  HEALTH ISSUES - PROBLEM Dx:    	  Vitals:  T(C): 37.1 (11-19-19 @ 09:51), Max: 37.1 (11-19-19 @ 09:51)  HR: 76 (11-19-19 @ 09:51) (76 - 97)  BP: 97/63 (11-19-19 @ 09:51) (97/63 - 145/71)  RR: 18 (11-19-19 @ 09:51) (15 - 18)  SpO2: 98% (11-19-19 @ 05:43) (97% - 99%)  I&O's Summary  18 Nov 2019 07:01  -  19 Nov 2019 07:00  --------------------------------------------------------  IN: 27 mL / OUT: 0 mL / NET: 27 mL  19 Nov 2019 07:01  -  19 Nov 2019 13:02  --------------------------------------------------------  IN: 240 mL / OUT: 0 mL / NET: 240 mL  Weight (kg): 81 (11-16 @ 06:23)    PHYSICAL EXAM:  Appearance: Comfortable. No acute distress  HEENT:  Head and neck: Atraumatic. Normocephalic.  Normal oral mucosa, PERRL, Neck is supple. No JVD, No carotid bruit.   Neurologic: A & O x 3, no focal deficits. EOMI , Cranial nerves are intact.  Lymphatic: No cervical lymphadenopathy  Cardiovascular: Normal S1 S2, No murmur, rubs/gallops. No JVD, No edema  Respiratory: Lungs clear to auscultation  Gastrointestinal:  Soft, Non-tender, + BS  Lower Extremities: No edema  Psychiatry: Patient is calm. No agitation. Mood & affect appropriate  Skin: No rashes/ ecchymoses/cyanosis/ulcers visualized on the face, hands or feet.  Procedure site:  No bleeding, no pain, no bruising, no hematoma, +pp, no edema.    CURRENT MEDICATIONS:  losartan 50 milliGRAM(s) Oral daily  metoprolol tartrate 25 milliGRAM(s) Oral two times a day  nitroglycerin     SubLingual 0.4 milliGRAM(s) SubLingual every 5 minutes PRN  loratadine  atorvastatin  fenofibrate Tablet  insulin glargine Injectable (LANTUS)  insulin lispro (HumaLOG) corrective regimen sliding scale  aspirin  chewable  clopidogrel Tablet  dextrose 5%.    LABS:	 	  CARDIAC MARKERS ( 18 Nov 2019 06:31 )  x     / 0.33 ng/mL / x     / x     / x      p-BNP 18 Nov 2019 06:31: x    , CARDIAC MARKERS ( 17 Nov 2019 06:19 )  x     / 0.26 ng/mL / x     / x     / x      p-BNP 17 Nov 2019 06:19: x    , CARDIAC MARKERS ( 16 Nov 2019 20:58 )  x     / 0.20 ng/mL / x     / x     / x      p-BNP 16 Nov 2019 20:58: x    , CARDIAC MARKERS ( 16 Nov 2019 15:54 )  x     / 0.18 ng/mL / x     / x     / x      p-BNP 16 Nov 2019 15:54: x                              14.4   6.51  )-----------( 164      ( 19 Nov 2019 06:18 )             45.1   11-19  138  |  99  |  28.0<H>  ----------------------------<  135<H>  4.4   |  26.0  |  1.11  Ca    9.0      19 Nov 2019 06:18  Mg     2.2     11-19  Lipid Profile: Date: 11-16 @ 05:37  Total cholesterol 235; Direct LDL: 136; HDL: 31; Triglycerides:338  HgA1c: Hemoglobin A1C, Whole Blood: 10.7 %    TELEMETRY: Reviewed - SR 90's, no alarms noted.

## 2019-11-19 NOTE — ADVANCED PRACTICE NURSE CONSULT - ASSESSMENT
went to see pt in am, pt is a+ox3 c/o 0a pain. pt states he has diabetes for many years and he takes farxiga jenuvia and metformin. he has a glucometer but is not consistent w checking his bg. his wife checks his bg. pt was educated about the importance of bg monitoring and was advised to check bg 2-3xdaily. also educated pt about the importance of using insulin @ this time, pt had some resistance but verbalized understanding after was educated about the type of insulin and its regimen. pt has a fu appointment w dr milner on december 24 2019 @ 1000a. pt was invited to [facility] diabetes club on december 1 2019 yearly calendar provided went to see pt in am, pt is a+ox3 c/o 0a pain. pt states he has diabetes for many years and he takes farxiga jenuvia and metformin he buys farxiga and jenuvia in [place] to keep cost affordable. he has a glucometer but is not consistent w checking his bg. his wife checks his bg. pt was educated about the importance of bg monitoring and was advised to check bg 2-3xdaily. also educated pt about the importance of using insulin @ this time, pt had some resistance but verbalized understanding after was educated about the type of insulin and its regimen. pt has a fu appointment w dr milner on december 24 2019 @ 1000a. pt was invited to [facility] diabetes club on december 1 2019 yearly calendar provided

## 2019-11-20 LAB
BLD GP AB SCN SERPL QL: SIGNIFICANT CHANGE UP
GLUCOSE BLDC GLUCOMTR-MCNC: 177 MG/DL — HIGH (ref 70–99)
GLUCOSE BLDC GLUCOMTR-MCNC: 195 MG/DL — HIGH (ref 70–99)
GLUCOSE BLDC GLUCOMTR-MCNC: 196 MG/DL — HIGH (ref 70–99)

## 2019-11-20 PROCEDURE — 99233 SBSQ HOSP IP/OBS HIGH 50: CPT

## 2019-11-20 PROCEDURE — 99232 SBSQ HOSP IP/OBS MODERATE 35: CPT

## 2019-11-20 PROCEDURE — 93010 ELECTROCARDIOGRAM REPORT: CPT

## 2019-11-20 RX ADMIN — LOSARTAN POTASSIUM 50 MILLIGRAM(S): 100 TABLET, FILM COATED ORAL at 05:41

## 2019-11-20 RX ADMIN — Medication 25 MILLIGRAM(S): at 05:41

## 2019-11-20 RX ADMIN — Medication 145 MILLIGRAM(S): at 13:25

## 2019-11-20 RX ADMIN — LORATADINE 10 MILLIGRAM(S): 10 TABLET ORAL at 13:25

## 2019-11-20 RX ADMIN — INSULIN GLARGINE 12 UNIT(S): 100 INJECTION, SOLUTION SUBCUTANEOUS at 21:58

## 2019-11-20 RX ADMIN — Medication 2: at 21:58

## 2019-11-20 RX ADMIN — ATORVASTATIN CALCIUM 80 MILLIGRAM(S): 80 TABLET, FILM COATED ORAL at 21:59

## 2019-11-20 RX ADMIN — Medication 2: at 17:20

## 2019-11-20 RX ADMIN — CLOPIDOGREL BISULFATE 75 MILLIGRAM(S): 75 TABLET, FILM COATED ORAL at 07:14

## 2019-11-20 RX ADMIN — Medication 2: at 07:25

## 2019-11-20 RX ADMIN — Medication 81 MILLIGRAM(S): at 07:14

## 2019-11-20 RX ADMIN — Medication 25 MILLIGRAM(S): at 17:20

## 2019-11-20 NOTE — PROGRESS NOTE ADULT - ASSESSMENT
S/P KOFI to LAD  S/P POBA to D1  D/C losartan for SBP 80's as d/w Dr Katz.  Monitor on tele overnight.  Possible discharge in the am.

## 2019-11-20 NOTE — PROGRESS NOTE ADULT - ASSESSMENT
69yoM hx DM, HLD, medication non-compliance presenting with intermittent episodes of mid-sternal, non-radiating chest pain for the past few days being admitted for ischemic evaluation     NSTEMI  s/p s/p PCI to RCA 99% - 1 KOFI.   S/P KOFI to LAD and POBA to D1- today  asa, bb, statin, heparin gtt, ARB  s/p plavix loading    DM2 uncontrolled  admits to noncompliance  insulin in house  titrate as needed  Lantus/ metformin 48 hrs later and Farxiga at discharge    Dyslipidemia uncontrolled  on statins now in addition to fenofibrate    Noncompliance  counselling done    Rhinitis, likely viral, stable  CXR negative for acute pathology   supportive care, Flonase, Mucinex PRN    Hyponatremia, resolved    Tobacco Use, stable  Low dose nicotine patch    Prophylactic measure  Intermittent prophylactic measure, hep drip    Dispo. D/C in AM 69yoM hx DM, HLD, medication non-compliance presenting with intermittent episodes of mid-sternal, non-radiating chest pain for the past few days being admitted for ischemic evaluation     NSTEMI  s/p s/p PCI to RCA 99% - 1 KOFI.   S/P KOFI to LAD and POBA to D1- today  asa, bb, statin, heparin gtt, ARB  s/p plavix loading    Hypotension to SBP 80s in cathlab  pt had taken all his AM meds  ARB was started to control HTN  now being d/padmaja per cardiology sec to hypotensive episode    DM2 uncontrolled  admits to noncompliance  insulin in house  titrate as needed  Lantus/ metformin 48 hrs later and Farxiga at discharge    Dyslipidemia uncontrolled  on statins now in addition to fenofibrate    Noncompliance  counselling done    Rhinitis, likely viral, stable  CXR negative for acute pathology   supportive care, Flonase, Mucinex PRN    Hyponatremia, resolved    Tobacco Use, stable  Low dose nicotine patch    Prophylactic measure  Intermittent prophylactic measure, hep drip    Dispo. D/C in AM

## 2019-11-20 NOTE — PROGRESS NOTE ADULT - SUBJECTIVE AND OBJECTIVE BOX
[place] [facility]                          [address] [place] [address]                       Phone: [phone]. Fax:[phone]                      ________________________________________________           Reason for follow up/Overnight events:     HPI:  69yoM hx DM, HLD, medication non-compliance presenting with intermittent episodes of mid-sternal, non-radiating chest pain for the past few days.  Reports sensation of chest pressure that occurs both at rest and during exertion that typically last for up to 20 minutes.  Denies any associated dyspnea, palpitations, diaphoresis, nausea or vomiting.  Pt has never had any prior exercise testing or cardiac caths.  Of note, pt also having a 2-3 week history of non-productive cough associated with post nasal drip.  Denies any fevers or chills.  On admission, EKG showed NSR, HR 91 with RBBB which was also seen on 2018 EKG.  Troponin negative x 2.  CXR was negative for acute pathology.  Pt was initially admitted to CDU in ED, was seen by cardiology while there who advised medicine admission for ischemic evaluation. (15 Nov 2019 20:14)      ROS: All review of systems negative unless indicated otherwise below.                          LAB RESULTS                   COMPLETE BLOOD COUNT( 2019 06:18 )                            14.4 g/dL  6.51 K/uL )---------------( 164 K/uL                        45.1 %      Automated Differential     Auto Basophil # - X      Auto Basophil % - X      Auto Eosinophil # - X      Auto Eosinophil % - X      Auto Immature Granulocyte # - X      Auto Immature Granulocyte % - X      Auto Lymphocyte # - X      Auto Lymphocyte % - X      Auto Monocyte # - X      Auto Monocyte % - X      Auto Neutrophil # - X      Auto Neutrophil % - X                                      CHEMISTRY                 Basic Metabolic Panel (19 @ 06:18)    138  |  99  |  28.0<H>  ----------------------------<  135<H>  4.4   |  26.0  |  1.11    Ca    9.0      2019 06:18  Mg     2.2                         Liver Functions (11-15-19 @ 14:48))  TPro  6.9  /  Alb  3.9  /  TBili  0.9  /  DBili  x   /  AST  25  /  ALT  26  /  AlkPhos  129     PT/INR/PTT ( 2019 06:18 )                        :                       :      X            :       30.2                  .        .                   .              .           .       X           .                                                                   Cardiac Enzymes   ( 2019 06:31 )  Troponin T  0.33<H>,  CPK  X    , CKMB  X    , BNP X        , ( 2019 06:19 )  Troponin T  0.26<H>,  CPK  X    , CKMB  X    , BNP X        , ( 2019 20:58 )  Troponin T  0.20<H>,  CPK  X    , CKMB  X    , BNP X                                  MICROBIOLOGY/CULTURES:                                RADIOLOGY RESULTS: Personally visualized                           CARDIOLOGY RESULTS: Official Report/Preliminary Verbal Reports    ECHO: < from: TTE Echo w/Cont Complete (19 @ 09:42) >   1. Technically good study.   2. Normal global left ventricular systolic function.   3. Left ventricular ejection fraction, by visual estimation, is 60 to   65%.   4. Spectral Doppler shows impaired relaxation pattern of left   ventricular myocardial filling (Grade I diastolic dysfunction).   5. There is no evidence of pericardial effusion.   6. No significant valvular disease.    CATH:  Cardiac Cath Lab - Adult (19 @ 14:42) < from: TTE Echo w/Cont Complete (19 @ 09:42) >      < end of copied text >    VENTRICLES: No LV gram was performed; however, a recent echocardiogram  demonstrated an EF of 65 %.  CORONARY VESSELS: The coronary circulation is co-dominant.  LM:   --  LM: Normal.  LAD:   --  Proximal LAD: There was a 70 % stenosis.  --  Mid LAD: There was a 70 % stenosis.  --  D1: There was a 80 % stenosis.  CX:   --  Circumflex: Normal.  RCA:   --  Proximal RCA: There was a 99 % stenosis.  COMPLICATIONS: No complications occurred during the cath lab visit.  DIAGNOSTIC IMPRESSIONS: Severe RCA disease- PCI with 1 KOFI.  Severe LAD disease- iFR 0.71.  DIAGNOSTIC RECOMMENDATIONS: Aspirin and plavix.  Plan for PCI to LAD/diagonal based on symptoms.                          CARDIOLOGY REVIEW: Personally visualized and reviewed    EKG:   Telemetry Last 24h:                              DAILY WEIGHTS - 48 HOUR TREND     Daily Weight in k.5 (2019 04:34), Weight in k.1 (2019 04:53)                             INTAKE AND OUTPUT - 48 HOUR TREND         HOME MEDICATIONS:  fenofibrate 145 mg oral tablet: 1 tab(s) orally once a day (15 Nov 2019 22:31)  Januvia 100 mg oral tablet: 1 tab(s) orally once a day (15 Nov 2019 22:31)  Lipitor 40 mg oral tablet: 1 tab(s) orally once a day (15 Nov 2019 22:31)  metFORMIN 1000 mg oral tablet, extended release: 1 tab(s) orally 2 times a day (15 Nov 2019 22:31)                             Current Admission Active Medications    acetaminophen   Tablet .. 650 milliGRAM(s) Oral every 6 hours PRN Temp greater or equal to 38C (100.4F), Mild Pain (1 - 3), Moderate Pain (4 - 6), Severe Pain (7 - 10)  aspirin  chewable 81 milliGRAM(s) Oral daily  atorvastatin 80 milliGRAM(s) Oral at bedtime  clopidogrel Tablet 75 milliGRAM(s) Oral daily  dextrose 40% Gel 15 Gram(s) Oral once PRN Blood Glucose LESS THAN 70 milliGRAM(s)/deciliter  dextrose 5%. 1000 milliLiter(s) (50 mL/Hr) IV Continuous <Continuous>  dextrose 50% Injectable 12.5 Gram(s) IV Push once  dextrose 50% Injectable 25 Gram(s) IV Push once  dextrose 50% Injectable 25 Gram(s) IV Push once  fenofibrate Tablet 145 milliGRAM(s) Oral daily  glucagon  Injectable 1 milliGRAM(s) IntraMuscular once PRN Glucose LESS THAN 70 milligrams/deciliter  guaiFENesin  milliGRAM(s) Oral every 12 hours PRN Cough  insulin glargine Injectable (LANTUS) 12 Unit(s) SubCutaneous at bedtime  insulin lispro (HumaLOG) corrective regimen sliding scale   SubCutaneous Before meals and at bedtime  loratadine 10 milliGRAM(s) Oral daily  losartan 50 milliGRAM(s) Oral daily  metoprolol tartrate 25 milliGRAM(s) Oral two times a day  nicotine -   7 mG/24Hr(s) Patch 1 patch Transdermal daily  nitroglycerin     SubLingual 0.4 milliGRAM(s) SubLingual every 5 minutes PRN Chest Pain                        PHYSICAL EXAM:    Vital Signs Last 24 Hrs  T(C): 36.4 (2019 07:34), Max: 36.8 (2019 19:33)  T(F): 97.6 (2019 07:34), Max: 98.2 (2019 19:33)  HR: 64 (2019 10:45) (64 - 94)  BP: 110/58 (2019 10:45) (97/61 - 122/73)  BP(mean): --  RR: 16 (2019 10:45) (16 - 18)  SpO2: 96% (2019 10:45) (95% - 99%)    GENERAL: NAD  NECK: Supple, No JVD  NERVOUS SYSTEM:  Alert & Oriented X3, non focal neuro exam.   CHEST/LUNG: clear lungs, No rales, rhonchi, wheezing, or rubs  HEART: Regular rate and rhythm; s1 and s2 auscultated, No murmurs, rubs, or gallops  ABDOMEN: Soft, Nontender, Nondistended; Bowel sounds present and normoactive.   EXTREMITIES:  2+ Peripheral Pulses, No clubbing, cyanosis, or edema  CATH SITE:  right groin angioseal oozing at site.  Pressure held and sandbag applied; stable [place] [facility]                          [address] [place] [address]                       Phone: [phone]. Fax:[phone]                      ________________________________________________           Reason for follow up/Overnight events:     HPI:  69yoM hx DM, HLD, medication non-compliance presenting with intermittent episodes of mid-sternal, non-radiating chest pain for the past few days.  Reports sensation of chest pressure that occurs both at rest and during exertion that typically last for up to 20 minutes.  Denies any associated dyspnea, palpitations, diaphoresis, nausea or vomiting.  Pt has never had any prior exercise testing or cardiac caths.  Of note, pt also having a 2-3 week history of non-productive cough associated with post nasal drip.  Denies any fevers or chills.  On admission, EKG showed NSR, HR 91 with RBBB which was also seen on 2018 EKG.  Troponin negative x 2.  CXR was negative for acute pathology.  Pt was initially admitted to CDU in ED, was seen by cardiology while there who advised medicine admission for ischemic evaluation. (15 Nov 2019 20:14)      ROS: All review of systems negative unless indicated otherwise below.                          LAB RESULTS                   COMPLETE BLOOD COUNT( 2019 06:18 )                            14.4 g/dL  6.51 K/uL )---------------( 164 K/uL                        45.1 %      Automated Differential     Auto Basophil # - X      Auto Basophil % - X      Auto Eosinophil # - X      Auto Eosinophil % - X      Auto Immature Granulocyte # - X      Auto Immature Granulocyte % - X      Auto Lymphocyte # - X      Auto Lymphocyte % - X      Auto Monocyte # - X      Auto Monocyte % - X      Auto Neutrophil # - X      Auto Neutrophil % - X                                      CHEMISTRY                 Basic Metabolic Panel (19 @ 06:18)    138  |  99  |  28.0<H>  ----------------------------<  135<H>  4.4   |  26.0  |  1.11    Ca    9.0      2019 06:18  Mg     2.2                         Liver Functions (11-15-19 @ 14:48))  TPro  6.9  /  Alb  3.9  /  TBili  0.9  /  DBili  x   /  AST  25  /  ALT  26  /  AlkPhos  129     PT/INR/PTT ( 2019 06:18 )                        :                       :      X            :       30.2                  .        .                   .              .           .       X           .                                                                   Cardiac Enzymes   ( 2019 06:31 )  Troponin T  0.33<H>,  CPK  X    , CKMB  X    , BNP X        , ( 2019 06:19 )  Troponin T  0.26<H>,  CPK  X    , CKMB  X    , BNP X        , ( 2019 20:58 )  Troponin T  0.20<H>,  CPK  X    , CKMB  X    , BNP X                                  MICROBIOLOGY/CULTURES:                                RADIOLOGY RESULTS: Personally visualized                           CARDIOLOGY RESULTS: Official Report/Preliminary Verbal Reports    ECHO: < from: TTE Echo w/Cont Complete (19 @ 09:42) >   1. Technically good study.   2. Normal global left ventricular systolic function.   3. Left ventricular ejection fraction, by visual estimation, is 60 to   65%.   4. Spectral Doppler shows impaired relaxation pattern of left   ventricular myocardial filling (Grade I diastolic dysfunction).   5. There is no evidence of pericardial effusion.   6. No significant valvular disease.    CATH:  Cardiac Cath Lab - Adult (19 @ 14:42) < from: TTE Echo w/Cont Complete (19 @ 09:42) >    VENTRICLES: No LV gram was performed; however, a recent echocardiogram  demonstrated an EF of 65 %.  CORONARY VESSELS: The coronary circulation is co-dominant.  LM:   --  LM: Normal.  LAD:   --  Proximal LAD: There was a 70 % stenosis.  --  Mid LAD: There was a 70 % stenosis.  --  D1: There was a 80 % stenosis.  CX:   --  Circumflex: Normal.  RCA:   --  Proximal RCA: There was a 99 % stenosis.  COMPLICATIONS: No complications occurred during the cath lab visit.  DIAGNOSTIC IMPRESSIONS: Severe RCA disease- PCI with 1 KOFI.  Severe LAD disease- iFR 0.71.  DIAGNOSTIC RECOMMENDATIONS: Aspirin and plavix.  Plan for PCI to LAD/diagonal based on symptoms.                          CARDIOLOGY REVIEW: Personally visualized and reviewed    EKG:  No acute ST changes  Telemetry Last 24h:                              DAILY WEIGHTS - 48 HOUR TREND     Daily Weight in k.5 (2019 04:34), Weight in k.1 (2019 04:53)                             INTAKE AND OUTPUT - 48 HOUR TREND         HOME MEDICATIONS:  fenofibrate 145 mg oral tablet: 1 tab(s) orally once a day (15 Nov 2019 22:31)  Januvia 100 mg oral tablet: 1 tab(s) orally once a day (15 Nov 2019 22:31)  Lipitor 40 mg oral tablet: 1 tab(s) orally once a day (15 Nov 2019 22:31)  metFORMIN 1000 mg oral tablet, extended release: 1 tab(s) orally 2 times a day (15 Nov 2019 22:31)                             Current Admission Active Medications    acetaminophen   Tablet .. 650 milliGRAM(s) Oral every 6 hours PRN Temp greater or equal to 38C (100.4F), Mild Pain (1 - 3), Moderate Pain (4 - 6), Severe Pain (7 - 10)  aspirin  chewable 81 milliGRAM(s) Oral daily  atorvastatin 80 milliGRAM(s) Oral at bedtime  clopidogrel Tablet 75 milliGRAM(s) Oral daily  dextrose 40% Gel 15 Gram(s) Oral once PRN Blood Glucose LESS THAN 70 milliGRAM(s)/deciliter  dextrose 5%. 1000 milliLiter(s) (50 mL/Hr) IV Continuous <Continuous>  dextrose 50% Injectable 12.5 Gram(s) IV Push once  dextrose 50% Injectable 25 Gram(s) IV Push once  dextrose 50% Injectable 25 Gram(s) IV Push once  fenofibrate Tablet 145 milliGRAM(s) Oral daily  glucagon  Injectable 1 milliGRAM(s) IntraMuscular once PRN Glucose LESS THAN 70 milligrams/deciliter  guaiFENesin  milliGRAM(s) Oral every 12 hours PRN Cough  insulin glargine Injectable (LANTUS) 12 Unit(s) SubCutaneous at bedtime  insulin lispro (HumaLOG) corrective regimen sliding scale   SubCutaneous Before meals and at bedtime  loratadine 10 milliGRAM(s) Oral daily  losartan 50 milliGRAM(s) Oral daily  metoprolol tartrate 25 milliGRAM(s) Oral two times a day  nicotine -   7 mG/24Hr(s) Patch 1 patch Transdermal daily  nitroglycerin     SubLingual 0.4 milliGRAM(s) SubLingual every 5 minutes PRN Chest Pain                        PHYSICAL EXAM:    Vital Signs Last 24 Hrs  T(C): 36.4 (2019 07:34), Max: 36.8 (2019 19:33)  T(F): 97.6 (2019 07:34), Max: 98.2 (2019 19:33)  HR: 64 (2019 10:45) (64 - 94)  BP: 110/58 (2019 10:45) (97/61 - 122/73)  BP(mean): --  RR: 16 (2019 10:45) (16 - 18)  SpO2: 96% (2019 10:45) (95% - 99%)    GENERAL: NAD  NECK: Supple, No JVD  NERVOUS SYSTEM:  Alert & Oriented X3, non focal neuro exam.   CHEST/LUNG: clear lungs, No rales, rhonchi, wheezing, or rubs  HEART: Regular rate and rhythm; s1 and s2 auscultated, No murmurs, rubs, or gallops  ABDOMEN: Soft, Nontender, Nondistended; Bowel sounds present and normoactive.   EXTREMITIES:  2+ Peripheral Pulses, No clubbing, cyanosis, or edema  CATH SITE:  right groin angioseal oozing at site.  Pressure held and sandbag applied; stable [place] [facility]                          [address] [place] [address]                       Phone: [phone]. Fax:[phone]                      ________________________________________________           Reason for follow up/Overnight events:     HPI:  69yoM hx DM, HLD, medication non-compliance presenting with intermittent episodes of mid-sternal, non-radiating chest pain for the past few days.  Reports sensation of chest pressure that occurs both at rest and during exertion that typically last for up to 20 minutes.  Denies any associated dyspnea, palpitations, diaphoresis, nausea or vomiting.  Pt has never had any prior exercise testing or cardiac caths.  Of note, pt also having a 2-3 week history of non-productive cough associated with post nasal drip.  Denies any fevers or chills.  On admission, EKG showed NSR, HR 91 with RBBB which was also seen on 2018 EKG.  Troponin negative x 2.  CXR was negative for acute pathology.  Pt was initially admitted to CDU in ED, was seen by cardiology while there who advised medicine admission for ischemic evaluation. (15 Nov 2019 20:14)      ROS: All review of systems negative unless indicated otherwise below.                          LAB RESULTS                   COMPLETE BLOOD COUNT( 2019 06:18 )                            14.4 g/dL  6.51 K/uL )---------------( 164 K/uL                        45.1 %      Automated Differential     Auto Basophil # - X      Auto Basophil % - X      Auto Eosinophil # - X      Auto Eosinophil % - X      Auto Immature Granulocyte # - X      Auto Immature Granulocyte % - X      Auto Lymphocyte # - X      Auto Lymphocyte % - X      Auto Monocyte # - X      Auto Monocyte % - X      Auto Neutrophil # - X      Auto Neutrophil % - X                                      CHEMISTRY                 Basic Metabolic Panel (19 @ 06:18)    138  |  99  |  28.0<H>  ----------------------------<  135<H>  4.4   |  26.0  |  1.11    Ca    9.0      2019 06:18  Mg     2.2                         Liver Functions (11-15-19 @ 14:48))  TPro  6.9  /  Alb  3.9  /  TBili  0.9  /  DBili  x   /  AST  25  /  ALT  26  /  AlkPhos  129     PT/INR/PTT ( 2019 06:18 )                        :                       :      X            :       30.2                  .        .                   .              .           .       X           .                                                                   Cardiac Enzymes   ( 2019 06:31 )  Troponin T  0.33<H>,  CPK  X    , CKMB  X    , BNP X        , ( 2019 06:19 )  Troponin T  0.26<H>,  CPK  X    , CKMB  X    , BNP X        , ( 2019 20:58 )  Troponin T  0.20<H>,  CPK  X    , CKMB  X    , BNP X                                  MICROBIOLOGY/CULTURES:                                RADIOLOGY RESULTS: Personally visualized                           CARDIOLOGY RESULTS: Official Report/Preliminary Verbal Reports    ECHO: < from: TTE Echo w/Cont Complete (19 @ 09:42) >   1. Technically good study.   2. Normal global left ventricular systolic function.   3. Left ventricular ejection fraction, by visual estimation, is 60 to   65%.   4. Spectral Doppler shows impaired relaxation pattern of left   ventricular myocardial filling (Grade I diastolic dysfunction).   5. There is no evidence of pericardial effusion.   6. No significant valvular disease.    CATH:  Cardiac Cath Lab - Adult (19 @ 14:42) < from: TTE Echo w/Cont Complete (19 @ 09:42) >    VENTRICLES: No LV gram was performed; however, a recent echocardiogram  demonstrated an EF of 65 %.  CORONARY VESSELS: The coronary circulation is co-dominant.  LM:   --  LM: Normal.  LAD:   --  Proximal LAD: There was a 70 % stenosis.  --  Mid LAD: There was a 70 % stenosis.  --  D1: There was a 80 % stenosis.  CX:   --  Circumflex: Normal.  RCA:   --  Proximal RCA: There was a 99 % stenosis.  COMPLICATIONS: No complications occurred during the cath lab visit.  DIAGNOSTIC IMPRESSIONS: Severe RCA disease- PCI with 1 KOFI.  Severe LAD disease- iFR 0.71.  DIAGNOSTIC RECOMMENDATIONS: Aspirin and plavix.  Plan for PCI to LAD/diagonal based on symptoms.                          CARDIOLOGY REVIEW: Personally visualized and reviewed    EKG:  NSR RBBB Q WAVE III AND AVf.  No acute ST changes                             DAILY WEIGHTS - 48 HOUR TREND     Daily Weight in k.5 (2019 04:34), Weight in k.1 (2019 04:53)                                    HOME MEDICATIONS:  fenofibrate 145 mg oral tablet: 1 tab(s) orally once a day (15 Nov 2019 22:31)  Januvia 100 mg oral tablet: 1 tab(s) orally once a day (15 Nov 2019 22:31)  Lipitor 40 mg oral tablet: 1 tab(s) orally once a day (15 Nov 2019 22:31)  metFORMIN 1000 mg oral tablet, extended release: 1 tab(s) orally 2 times a day (15 Nov 2019 22:31)                             Current Admission Active Medications    acetaminophen   Tablet .. 650 milliGRAM(s) Oral every 6 hours PRN Temp greater or equal to 38C (100.4F), Mild Pain (1 - 3), Moderate Pain (4 - 6), Severe Pain (7 - 10)  aspirin  chewable 81 milliGRAM(s) Oral daily  atorvastatin 80 milliGRAM(s) Oral at bedtime  clopidogrel Tablet 75 milliGRAM(s) Oral daily  dextrose 40% Gel 15 Gram(s) Oral once PRN Blood Glucose LESS THAN 70 milliGRAM(s)/deciliter  dextrose 5%. 1000 milliLiter(s) (50 mL/Hr) IV Continuous <Continuous>  dextrose 50% Injectable 12.5 Gram(s) IV Push once  dextrose 50% Injectable 25 Gram(s) IV Push once  dextrose 50% Injectable 25 Gram(s) IV Push once  fenofibrate Tablet 145 milliGRAM(s) Oral daily  glucagon  Injectable 1 milliGRAM(s) IntraMuscular once PRN Glucose LESS THAN 70 milligrams/deciliter  guaiFENesin  milliGRAM(s) Oral every 12 hours PRN Cough  insulin glargine Injectable (LANTUS) 12 Unit(s) SubCutaneous at bedtime  insulin lispro (HumaLOG) corrective regimen sliding scale   SubCutaneous Before meals and at bedtime  loratadine 10 milliGRAM(s) Oral daily  losartan 50 milliGRAM(s) Oral daily  metoprolol tartrate 25 milliGRAM(s) Oral two times a day  nicotine -   7 mG/24Hr(s) Patch 1 patch Transdermal daily  nitroglycerin     SubLingual 0.4 milliGRAM(s) SubLingual every 5 minutes PRN Chest Pain                        PHYSICAL EXAM:    Vital Signs Last 24 Hrs  T(C): 36.4 (2019 07:34), Max: 36.8 (2019 19:33)  T(F): 97.6 (2019 07:34), Max: 98.2 (2019 19:33)  HR: 64 (2019 10:45) (64 - 94)  BP: 110/58 (2019 10:45) (97/61 - 122/73)  BP(mean): --  RR: 16 (2019 10:45) (16 - 18)  SpO2: 96% (2019 10:45) (95% - 99%)    GENERAL: NAD  NECK: Supple, No JVD  NERVOUS SYSTEM:  Alert & Oriented X3, non focal neuro exam.   CHEST/LUNG: clear lungs, No rales, rhonchi, wheezing, or rubs  HEART: Regular rate and rhythm; s1 and s2 auscultated, No murmurs, rubs, or gallops  ABDOMEN: Soft, Nontender, Nondistended; Bowel sounds present and normoactive.   EXTREMITIES:  2+ Peripheral Pulses, No clubbing, cyanosis, or edema  CATH SITE:  right groin angioseal oozing at site.  Pressure held and sandbag applied; stable

## 2019-11-20 NOTE — PROGRESS NOTE ADULT - SUBJECTIVE AND OBJECTIVE BOX
HEALTH ISSUES - PROBLEM Dx:    NSTEMI    INTERVAL HPI/ OVERNIGHT EVENTS:    s/p s/p PCI to RCA 99% - 1 KOFI.   S/P KOFI to LAD and POBA to D1- today    REVIEW OF SYSTEMS:    CP- sob- palpitations-       Vital Signs Last 24 Hrs  T(C): 36.4 (20 Nov 2019 07:34), Max: 36.8 (19 Nov 2019 19:33)  T(F): 97.6 (20 Nov 2019 07:34), Max: 98.2 (19 Nov 2019 19:33)  HR: 83 (20 Nov 2019 13:52) (64 - 94)  BP: 105/68 (20 Nov 2019 13:52) (97/61 - 122/73)  BP(mean): --  RR: 18 (20 Nov 2019 13:52) (16 - 18)  SpO2: 98% (20 Nov 2019 13:52) (95% - 99%)    PHYSICAL EXAM-  GENERAL: comfortable no distress, pain free  HEAD:  Atraumatic, Normocephalic  EYES: EOMI, conjunctiva and sclera clear  ENT: Moist mucous membranes, No lesions  NECK: Supple, Normal thyroid  NERVOUS SYSTEM:  Alert & Oriented X 3, Motor Strength 5/5 B/L upper and lower extremities  CHEST/LUNG: Clear to percussion bilaterally; No rales, rhonchi, wheezing, or rubs  HEART: Regular rate and rhythm; No murmurs, rubs, or gallops  ABDOMEN: Soft, Nontender, Nondistended; Bowel sounds present, Rt groin angioseal  EXTREMITIES:  2+ Peripheral Pulses, No clubbing, cyanosis, or edema    MEDICATIONS  (STANDING):  aspirin  chewable 81 milliGRAM(s) Oral daily  atorvastatin 80 milliGRAM(s) Oral at bedtime  clopidogrel Tablet 75 milliGRAM(s) Oral daily  dextrose 5%. 1000 milliLiter(s) (50 mL/Hr) IV Continuous <Continuous>  dextrose 50% Injectable 12.5 Gram(s) IV Push once  dextrose 50% Injectable 25 Gram(s) IV Push once  dextrose 50% Injectable 25 Gram(s) IV Push once  fenofibrate Tablet 145 milliGRAM(s) Oral daily  insulin glargine Injectable (LANTUS) 12 Unit(s) SubCutaneous at bedtime  insulin lispro (HumaLOG) corrective regimen sliding scale   SubCutaneous Before meals and at bedtime  loratadine 10 milliGRAM(s) Oral daily  metoprolol tartrate 25 milliGRAM(s) Oral two times a day  nicotine -   7 mG/24Hr(s) Patch 1 patch Transdermal daily    MEDICATIONS  (PRN):  acetaminophen   Tablet .. 650 milliGRAM(s) Oral every 6 hours PRN Temp greater or equal to 38C (100.4F), Mild Pain (1 - 3), Moderate Pain (4 - 6), Severe Pain (7 - 10)  dextrose 40% Gel 15 Gram(s) Oral once PRN Blood Glucose LESS THAN 70 milliGRAM(s)/deciliter  glucagon  Injectable 1 milliGRAM(s) IntraMuscular once PRN Glucose LESS THAN 70 milligrams/deciliter  guaiFENesin  milliGRAM(s) Oral every 12 hours PRN Cough  nitroglycerin     SubLingual 0.4 milliGRAM(s) SubLingual every 5 minutes PRN Chest Pain      LABS:                        14.4   6.51  )-----------( 164      ( 19 Nov 2019 06:18 )             45.1     11-19    138  |  99  |  28.0<H>  ----------------------------<  135<H>  4.4   |  26.0  |  1.11    Ca    9.0      19 Nov 2019 06:18  Mg     2.2     11-19      PTT - ( 19 Nov 2019 06:18 )  PTT:30.2 sec

## 2019-11-21 ENCOUNTER — TRANSCRIPTION ENCOUNTER (OUTPATIENT)
Age: 69
End: 2019-11-21

## 2019-11-21 VITALS
RESPIRATION RATE: 18 BRPM | OXYGEN SATURATION: 99 % | DIASTOLIC BLOOD PRESSURE: 70 MMHG | SYSTOLIC BLOOD PRESSURE: 110 MMHG | HEART RATE: 65 BPM

## 2019-11-21 DIAGNOSIS — I25.10 ATHEROSCLEROTIC HEART DISEASE OF NATIVE CORONARY ARTERY WITHOUT ANGINA PECTORIS: ICD-10-CM

## 2019-11-21 DIAGNOSIS — Z98.890 OTHER SPECIFIED POSTPROCEDURAL STATES: ICD-10-CM

## 2019-11-21 LAB
ANION GAP SERPL CALC-SCNC: 10 MMOL/L — SIGNIFICANT CHANGE UP (ref 5–17)
BASOPHILS # BLD AUTO: 0.02 K/UL — SIGNIFICANT CHANGE UP (ref 0–0.2)
BASOPHILS NFR BLD AUTO: 0.3 % — SIGNIFICANT CHANGE UP (ref 0–2)
BUN SERPL-MCNC: 23 MG/DL — HIGH (ref 8–20)
CALCIUM SERPL-MCNC: 8.3 MG/DL — LOW (ref 8.6–10.2)
CHLORIDE SERPL-SCNC: 105 MMOL/L — SIGNIFICANT CHANGE UP (ref 98–107)
CO2 SERPL-SCNC: 25 MMOL/L — SIGNIFICANT CHANGE UP (ref 22–29)
CREAT SERPL-MCNC: 1.03 MG/DL — SIGNIFICANT CHANGE UP (ref 0.5–1.3)
EOSINOPHIL # BLD AUTO: 0.08 K/UL — SIGNIFICANT CHANGE UP (ref 0–0.5)
EOSINOPHIL NFR BLD AUTO: 1.3 % — SIGNIFICANT CHANGE UP (ref 0–6)
GLUCOSE BLDC GLUCOMTR-MCNC: 109 MG/DL — HIGH (ref 70–99)
GLUCOSE SERPL-MCNC: 127 MG/DL — HIGH (ref 70–115)
HCT VFR BLD CALC: 40 % — SIGNIFICANT CHANGE UP (ref 39–50)
HGB BLD-MCNC: 12.8 G/DL — LOW (ref 13–17)
IMM GRANULOCYTES NFR BLD AUTO: 0.3 % — SIGNIFICANT CHANGE UP (ref 0–1.5)
LYMPHOCYTES # BLD AUTO: 1 K/UL — SIGNIFICANT CHANGE UP (ref 1–3.3)
LYMPHOCYTES # BLD AUTO: 16.5 % — SIGNIFICANT CHANGE UP (ref 13–44)
MCHC RBC-ENTMCNC: 28.5 PG — SIGNIFICANT CHANGE UP (ref 27–34)
MCHC RBC-ENTMCNC: 32 GM/DL — SIGNIFICANT CHANGE UP (ref 32–36)
MCV RBC AUTO: 89.1 FL — SIGNIFICANT CHANGE UP (ref 80–100)
MONOCYTES # BLD AUTO: 0.6 K/UL — SIGNIFICANT CHANGE UP (ref 0–0.9)
MONOCYTES NFR BLD AUTO: 9.9 % — SIGNIFICANT CHANGE UP (ref 2–14)
NEUTROPHILS # BLD AUTO: 4.34 K/UL — SIGNIFICANT CHANGE UP (ref 1.8–7.4)
NEUTROPHILS NFR BLD AUTO: 71.7 % — SIGNIFICANT CHANGE UP (ref 43–77)
PLATELET # BLD AUTO: 152 K/UL — SIGNIFICANT CHANGE UP (ref 150–400)
POTASSIUM SERPL-MCNC: 4.4 MMOL/L — SIGNIFICANT CHANGE UP (ref 3.5–5.3)
POTASSIUM SERPL-SCNC: 4.4 MMOL/L — SIGNIFICANT CHANGE UP (ref 3.5–5.3)
RBC # BLD: 4.49 M/UL — SIGNIFICANT CHANGE UP (ref 4.2–5.8)
RBC # FLD: 12.2 % — SIGNIFICANT CHANGE UP (ref 10.3–14.5)
SODIUM SERPL-SCNC: 140 MMOL/L — SIGNIFICANT CHANGE UP (ref 135–145)
WBC # BLD: 6.06 K/UL — SIGNIFICANT CHANGE UP (ref 3.8–10.5)
WBC # FLD AUTO: 6.06 K/UL — SIGNIFICANT CHANGE UP (ref 3.8–10.5)

## 2019-11-21 PROCEDURE — 93010 ELECTROCARDIOGRAM REPORT: CPT

## 2019-11-21 PROCEDURE — C1753: CPT

## 2019-11-21 PROCEDURE — 80061 LIPID PANEL: CPT

## 2019-11-21 PROCEDURE — 84484 ASSAY OF TROPONIN QUANT: CPT

## 2019-11-21 PROCEDURE — 80048 BASIC METABOLIC PNL TOTAL CA: CPT

## 2019-11-21 PROCEDURE — 86901 BLOOD TYPING SEROLOGIC RH(D): CPT

## 2019-11-21 PROCEDURE — 86803 HEPATITIS C AB TEST: CPT

## 2019-11-21 PROCEDURE — C8929: CPT

## 2019-11-21 PROCEDURE — 80053 COMPREHEN METABOLIC PANEL: CPT

## 2019-11-21 PROCEDURE — 99152 MOD SED SAME PHYS/QHP 5/>YRS: CPT

## 2019-11-21 PROCEDURE — 82550 ASSAY OF CK (CPK): CPT

## 2019-11-21 PROCEDURE — 99232 SBSQ HOSP IP/OBS MODERATE 35: CPT

## 2019-11-21 PROCEDURE — 85610 PROTHROMBIN TIME: CPT

## 2019-11-21 PROCEDURE — 99153 MOD SED SAME PHYS/QHP EA: CPT

## 2019-11-21 PROCEDURE — C1760: CPT

## 2019-11-21 PROCEDURE — 86900 BLOOD TYPING SEROLOGIC ABO: CPT

## 2019-11-21 PROCEDURE — 99285 EMERGENCY DEPT VISIT HI MDM: CPT | Mod: 25

## 2019-11-21 PROCEDURE — 83735 ASSAY OF MAGNESIUM: CPT

## 2019-11-21 PROCEDURE — 86850 RBC ANTIBODY SCREEN: CPT

## 2019-11-21 PROCEDURE — 36415 COLL VENOUS BLD VENIPUNCTURE: CPT

## 2019-11-21 PROCEDURE — 71046 X-RAY EXAM CHEST 2 VIEWS: CPT

## 2019-11-21 PROCEDURE — 93571 IV DOP VEL&/PRESS C FLO 1ST: CPT | Mod: LD

## 2019-11-21 PROCEDURE — 85730 THROMBOPLASTIN TIME PARTIAL: CPT

## 2019-11-21 PROCEDURE — C1894: CPT

## 2019-11-21 PROCEDURE — 92921: CPT | Mod: LD

## 2019-11-21 PROCEDURE — C9600: CPT | Mod: LD

## 2019-11-21 PROCEDURE — 85027 COMPLETE CBC AUTOMATED: CPT

## 2019-11-21 PROCEDURE — 82962 GLUCOSE BLOOD TEST: CPT

## 2019-11-21 PROCEDURE — G0378: CPT

## 2019-11-21 PROCEDURE — C1769: CPT

## 2019-11-21 PROCEDURE — C9606: CPT | Mod: RC

## 2019-11-21 PROCEDURE — C1725: CPT

## 2019-11-21 PROCEDURE — 83605 ASSAY OF LACTIC ACID: CPT

## 2019-11-21 PROCEDURE — 94640 AIRWAY INHALATION TREATMENT: CPT

## 2019-11-21 PROCEDURE — 99239 HOSP IP/OBS DSCHRG MGMT >30: CPT

## 2019-11-21 PROCEDURE — 93454 CORONARY ARTERY ANGIO S&I: CPT | Mod: XU

## 2019-11-21 PROCEDURE — 93005 ELECTROCARDIOGRAM TRACING: CPT

## 2019-11-21 PROCEDURE — 82010 KETONE BODYS QUAN: CPT

## 2019-11-21 PROCEDURE — C1874: CPT

## 2019-11-21 PROCEDURE — C1887: CPT

## 2019-11-21 PROCEDURE — 83690 ASSAY OF LIPASE: CPT

## 2019-11-21 PROCEDURE — 92978 ENDOLUMINL IVUS OCT C 1ST: CPT | Mod: LD

## 2019-11-21 PROCEDURE — 83036 HEMOGLOBIN GLYCOSYLATED A1C: CPT

## 2019-11-21 RX ORDER — FENOFIBRATE,MICRONIZED 130 MG
1 CAPSULE ORAL
Qty: 30 | Refills: 0
Start: 2019-11-21 | End: 2019-12-20

## 2019-11-21 RX ORDER — SODIUM CHLORIDE 9 MG/ML
500 INJECTION INTRAMUSCULAR; INTRAVENOUS; SUBCUTANEOUS ONCE
Refills: 0 | Status: COMPLETED | OUTPATIENT
Start: 2019-11-21 | End: 2019-11-21

## 2019-11-21 RX ORDER — ATORVASTATIN CALCIUM 80 MG/1
1 TABLET, FILM COATED ORAL
Qty: 30 | Refills: 0
Start: 2019-11-21 | End: 2019-12-20

## 2019-11-21 RX ORDER — METOPROLOL TARTRATE 50 MG
1 TABLET ORAL
Qty: 60 | Refills: 0
Start: 2019-11-21 | End: 2019-12-20

## 2019-11-21 RX ORDER — ASPIRIN/CALCIUM CARB/MAGNESIUM 324 MG
1 TABLET ORAL
Qty: 30 | Refills: 0
Start: 2019-11-21 | End: 2019-12-20

## 2019-11-21 RX ORDER — FENOFIBRATE,MICRONIZED 130 MG
1 CAPSULE ORAL
Qty: 0 | Refills: 0 | DISCHARGE

## 2019-11-21 RX ORDER — CLOPIDOGREL BISULFATE 75 MG/1
1 TABLET, FILM COATED ORAL
Qty: 30 | Refills: 0
Start: 2019-11-21 | End: 2019-12-20

## 2019-11-21 RX ORDER — DAPAGLIFLOZIN 10 MG/1
1 TABLET, FILM COATED ORAL
Qty: 30 | Refills: 0
Start: 2019-11-21 | End: 2019-12-20

## 2019-11-21 RX ORDER — ENOXAPARIN SODIUM 100 MG/ML
12 INJECTION SUBCUTANEOUS
Qty: 2 | Refills: 0
Start: 2019-11-21

## 2019-11-21 RX ORDER — SITAGLIPTIN 50 MG/1
1 TABLET, FILM COATED ORAL
Qty: 0 | Refills: 0 | DISCHARGE

## 2019-11-21 RX ORDER — ATORVASTATIN CALCIUM 80 MG/1
1 TABLET, FILM COATED ORAL
Qty: 0 | Refills: 0 | DISCHARGE

## 2019-11-21 RX ORDER — METFORMIN HYDROCHLORIDE 850 MG/1
1 TABLET ORAL
Qty: 0 | Refills: 0 | DISCHARGE

## 2019-11-21 RX ORDER — METFORMIN HYDROCHLORIDE 850 MG/1
1 TABLET ORAL
Qty: 60 | Refills: 0
Start: 2019-11-21 | End: 2019-12-20

## 2019-11-21 RX ADMIN — CLOPIDOGREL BISULFATE 75 MILLIGRAM(S): 75 TABLET, FILM COATED ORAL at 08:59

## 2019-11-21 RX ADMIN — Medication 81 MILLIGRAM(S): at 08:59

## 2019-11-21 RX ADMIN — Medication 25 MILLIGRAM(S): at 06:08

## 2019-11-21 RX ADMIN — LORATADINE 10 MILLIGRAM(S): 10 TABLET ORAL at 08:59

## 2019-11-21 RX ADMIN — Medication 145 MILLIGRAM(S): at 08:59

## 2019-11-21 RX ADMIN — SODIUM CHLORIDE 250 MILLILITER(S): 9 INJECTION INTRAMUSCULAR; INTRAVENOUS; SUBCUTANEOUS at 08:55

## 2019-11-21 NOTE — PROGRESS NOTE ADULT - ASSESSMENT
This is a 70 y/o male with untreated HTN, HLD and diabetes, smoker who complains of intermittent chest discomfort. His chest discomfort is not pleuritic. Heparin drip initiated as well as ACEs medications (statins, ACE, BB, Dapt: plavix 75mg and aspirin 81mg daily). echo showed EF 60-65% w/ grade 1 diastolic dysfunction. Now s/p LHC which showed pLAD 80%, mLAD 70%, D1 70% consistent with severe LAD/Diag disease s/p POBA, to D1 and KOFI x 1 to LAD.

## 2019-11-21 NOTE — PROGRESS NOTE ADULT - SUBJECTIVE AND OBJECTIVE BOX
[facility]                          [address] [place] [address]                       Phone: [phone]. Fax:[phone]                      ________________________________________________           Reason for follow up/Overnight events: follow up post cath, patient feels much better. Dry on physical exam    HPI:  69yoM hx DM, HLD, medication non-compliance presenting with intermittent episodes of mid-sternal, non-radiating chest pain for the past few days.  Reports sensation of chest pressure that occurs both at rest and during exertion that typically last for up to 20 minutes.  Denies any associated dyspnea, palpitations, diaphoresis, nausea or vomiting.  Pt has never had any prior exercise testing or cardiac caths.  Of note, pt also having a 2-3 week history of non-productive cough associated with post nasal drip.  Denies any fevers or chills.  On admission, EKG showed NSR, HR 91 with RBBB which was also seen on 2018 EKG.  Troponin negative x 2.  CXR was negative for acute pathology.  Pt was initially admitted to CDU in ED, was seen by cardiology while there who advised medicine admission for ischemic evaluation. (15 Nov 2019 20:14)      ROS: All review of systems negative unless indicated otherwise below.                          LAB RESULTS                   COMPLETE BLOOD COUNT( 2019 06:45 )                            12.8 g/dL<L>  6.06 K/uL )---------------( 152 K/uL                        40.0 %      Automated Differential     Auto Basophil # - 0.02 K/uL  Auto Basophil % - 0.3 %  Auto Eosinophil # - 0.08 K/uL  Auto Eosinophil % - 1.3 %  Auto Immature Granulocyte # - X      Auto Immature Granulocyte % - 0.3 %  Auto Lymphocyte # - 1.00 K/uL  Auto Lymphocyte % - 16.5 %  Auto Monocyte # - 0.60 K/uL  Auto Monocyte % - 9.9 %  Auto Neutrophil # - 4.34 K/uL  Auto Neutrophil % - 71.7 %                                  CHEMISTRY                 Basic Metabolic Panel (19 @ 06:45)    140  |  105  |  23.0<H>  ----------------------------<  127<H>  4.4   |  25.0  |  1.03    Ca    8.3<L>      2019 06:45  Mg     2.2                         Liver Functions (11-15-19 @ 14:48))  TPro  6.9  /  Alb  3.9  /  TBili  0.9  /  DBili  x   /  AST  25  /  ALT  26  /  AlkPhos  129     PT/INR/PTT ( 2019 06:18 )                        :                       :       X            :       30.2                  .        .                   .              .           .       X           .                                                                   Cardiac Enzymes   ( 2019 06:31 )  Troponin T  0.33<H>,  CPK  X    , CKMB  X    , BNP X        , ( 2019 06:19 )  Troponin T  0.26<H>,  CPK  X    , CKMB  X    , BNP X        , ( 2019 20:58 )  Troponin T  0.20<H>,  CPK  X    , CKMB  X    , BNP X                              CARDIOLOGY RESULTS: Official Report/Preliminary Verbal Reports    ECHO:   < from: TTE Echo w/Cont Complete (19 @ 09:42) >  Summary:   1. Technically good study.   2. Normal global left ventricular systolic function.   3. Left ventricular ejection fraction, by visual estimation, is 60 to   65%.   4. Spectral Doppler shows impaired relaxation pattern of left   ventricular myocardial filling (Grade I diastolic dysfunction).   5. There is no evidence of pericardial effusion.   6. No significant valvular heart disease.    MD Mark Electronically signed on 2019 at 10:51:27 AM    < end of copied text >    CATH:   < from: Cardiac Cath Lab - Adult (19 @ 08:02) >  CORONARY VESSELS: The coronary circulation is right dominant.  LM:   --  LM: Normal.  LAD:   --  Proximal LAD: There was a 80 % stenosis.  --  Mid LAD: There was a 70 % stenosis.  --  D1: There was a 70 % stenosis.  CX:   --  Circumflex: Normal.  RCA:   --  RCA: This vessel was not injected.  COMPLICATIONS: No complications occurred during the cath lab visit. No  complications occurred during the cath lab visit.  DIAGNOSTIC IMPRESSIONS: Severe LAD/diagonal disease. POBA performed to  diagonal with angioscuplt balloon. PCI performed to LAD with1 KOFI.  DIAGNOSTIC RECOMMENDATIONS: Aspirin and plavix.  INTERVENTIONAL IMPRESSIONS: Severe LAD/diagonal disease. POBA performed to  diagonal with angioscuplt balloon. PCI performed to LAD with 1 KOFI.  INTERVENTIONAL RECOMMENDATIONS: Aspirin and plavix.  Prepared and signed by  Clifton Katz MD    < end of copied text >                          CARDIOLOGY REVIEW: Personally visualized and reviewed    EKG: NSR no ectopy, unchanged   Telemetry Last 24h: NSR 60s-100                             DAILY WEIGHTS - 48 HOUR TREND     Daily Weight in k.6 (2019 04:36), Weight in k.5 (2019 04:34)    HOME MEDICATIONS:                             Current Admission Active Medications    acetaminophen   Tablet .. 650 milliGRAM(s) Oral every 6 hours PRN Temp greater or equal to 38C (100.4F), Mild Pain (1 - 3), Moderate Pain (4 - 6), Severe Pain (7 - 10)  aspirin  chewable 81 milliGRAM(s) Oral daily  atorvastatin 80 milliGRAM(s) Oral at bedtime  clopidogrel Tablet 75 milliGRAM(s) Oral daily  dextrose 40% Gel 15 Gram(s) Oral once PRN Blood Glucose LESS THAN 70 milliGRAM(s)/deciliter  dextrose 5%. 1000 milliLiter(s) (50 mL/Hr) IV Continuous <Continuous>  dextrose 50% Injectable 12.5 Gram(s) IV Push once  dextrose 50% Injectable 25 Gram(s) IV Push once  dextrose 50% Injectable 25 Gram(s) IV Push once  fenofibrate Tablet 145 milliGRAM(s) Oral daily  glucagon  Injectable 1 milliGRAM(s) IntraMuscular once PRN Glucose LESS THAN 70 milligrams/deciliter  guaiFENesin  milliGRAM(s) Oral every 12 hours PRN Cough  insulin glargine Injectable (LANTUS) 12 Unit(s) SubCutaneous at bedtime  insulin lispro (HumaLOG) corrective regimen sliding scale   SubCutaneous Before meals and at bedtime  loratadine 10 milliGRAM(s) Oral daily  metoprolol tartrate 25 milliGRAM(s) Oral two times a day  nicotine -   7 mG/24Hr(s) Patch 1 patch Transdermal daily  nitroglycerin     SubLingual 0.4 milliGRAM(s) SubLingual every 5 minutes PRN Chest Pain                        PHYSICAL EXAM:    Vital Signs Last 24 Hrs  T(C): 36.6 (2019 10:12), Max: 36.9 (2019 15:51)  T(F): 97.8 (2019 10:12), Max: 98.4 (2019 15:51)  HR: 71 (2019 10:12) (64 - 88)  BP: 99/64 (2019 10:12) (99/60 - 121/65)  BP(mean): --  RR: 18 (2019 10:12) (16 - 18)  SpO2: 99% (2019 10:12) (96% - 99%)    GENERAL: NAD  NECK: Supple, No JVD  NERVOUS SYSTEM:  Alert & Oriented X3, non focal neuro exam.   CHEST/LUNG: clear lungs, No rales, rhonchi, wheezing, or rubs  HEART: Regular rate and rhythm; s1 and s2 auscultated, No murmurs, rubs, or gallops  ABDOMEN: Soft, Nontender, Nondistended; Bowel sounds present and normoactive.   EXTREMITIES:  2+ Peripheral Pulses, No clubbing, cyanosis, or edema  CATH SITE: Right groin benign s/p cath.  No bleeding, no ecchymosis, no hematoma. Extremity Warm to touch, with palpable distal pulses, and brisk capillary refill.

## 2019-11-21 NOTE — DISCHARGE NOTE NURSING/CASE MANAGEMENT/SOCIAL WORK - PATIENT PORTAL LINK FT
You can access the FollowMyHealth Patient Portal offered by [facility] by registering at the following website: [email or web address]. By joining [email or web address]’s FollowMyHealth portal, you will also be able to view your health information using other applications (apps) compatible with our system.

## 2019-11-21 NOTE — PROGRESS NOTE ADULT - PROBLEM SELECTOR PLAN 1
S/p LHC - POBA to D1, x1 KOFI to LAD   continue DAPT, lipitor, tricor, metoprolol.   Losartan stopped in cath lab for hypotension, will revisit as patient is dry and received IVF.   Diet/lifestyle modifications and medication compliance heavily reinforced   smoking cessation reinforced S/p LHC - POBA to D1, x1 KOFI to LAD   continue DAPT, lipitor, tricor, metoprolol.   Losartan stopped in cath lab for hypotension - will revisit outpatient   Diet/lifestyle modifications and medication compliance heavily reinforced   smoking cessation reinforced  Stable for DC from cardiology standpoint - follow up with Dr. Kong outpatient

## 2019-11-21 NOTE — PROGRESS NOTE ADULT - ATTENDING COMMENTS
NSTEMi, DMII, HLD.   ASpirin, plavix.   PCI to RCA today.   Plan for staged LAD/diagonal.
case discussed w/ family @ bedside
pt seen and examined.   He is cp free.   EKG ordered.  BP is controlled.   plan for echo in am followed by cath.   I reviewed the above and agree with a/p.
pt seen and examined. Consulted with diabetic educator.  no more cp. heparin id dc  pt is ambulating  plan to have LAD and D stent in am.  I reviewed the above and agree with ap
pt seen and examined. PCI to LAD and D1.  His BP is low. We will hold off on bp meds for now  cont with DAPT, BB, statin  plan for dc in am,
pt seen and examined  plan of care dw np  no more cp  TTE with normal LV function, no valvular abnormality  plan for cath today.   I agree with a/p.
pt seen and examined. Doing well without cp.   Tele negative   on DAPT. cont with current meds  FU in 2 week.

## 2019-11-21 NOTE — PROGRESS NOTE ADULT - PROBLEM SELECTOR PLAN 2
cardiac rehab info provided/referral and communication to cardiac rehab completed  Patient educated about groin site care, signs and symptoms of complication post procedure, and plan of care moving forward. Patient verbalized understanding with teach-back.

## 2020-01-25 NOTE — H&P ADULT - NSHPSOCIALHISTORY_GEN_ALL_CORE
gradual onset
, lives with wife  Active smoker, few cigarettes per day  Social EtOH use  Denies drug use

## 2020-02-01 NOTE — ED ADULT NURSE NOTE - INTEGUMENTARY WDL
Slept well during the night. Denies pain. Antibiotic therapy continues. Comfort and safety maintained.   Color consistent with ethnicity/race, warm, dry intact, resilient.

## 2020-04-06 ENCOUNTER — EMERGENCY (EMERGENCY)
Facility: HOSPITAL | Age: 70
LOS: 1 days | Discharge: HOME | End: 2020-04-06
Attending: EMERGENCY MEDICINE
Payer: COMMERCIAL

## 2020-04-06 VITALS
TEMPERATURE: 100 F | HEIGHT: 66 IN | SYSTOLIC BLOOD PRESSURE: 112 MMHG | RESPIRATION RATE: 22 BRPM | HEART RATE: 84 BPM | OXYGEN SATURATION: 94 % | DIASTOLIC BLOOD PRESSURE: 72 MMHG | WEIGHT: 175.93 LBS

## 2020-04-06 PROCEDURE — 99282 EMERGENCY DEPT VISIT SF MDM: CPT

## 2020-04-06 PROCEDURE — 99284 EMERGENCY DEPT VISIT MOD MDM: CPT

## 2020-04-06 NOTE — ED PROVIDER NOTE - NSFOLLOWUPINSTRUCTIONS_ED_ALL_ED_FT
READ ALL ATTACHED INSTRUCTIONS FOR CORONAVIRUS IMMEDIATELY   -Do NOT return to work/school/public areas until your COVID test results as negative.   -SELF QUARANTINE until COVID result is available.   -Avoid contact with others.   -Wash your hands frequently. Disinfect surfaces frequently    SEEK IMMEDIATE MEDICAL CARE IF YOU HAVE ANY OF THE FOLLOWING SYMPTOMS  **If you develop worsening or new symptoms such as shortness of breath, difficulty breathing, chest pain, confusion, severe weakness, or anything concerning to you, please seek immediate medical care or return to the ER .**

## 2020-04-06 NOTE — ED PROVIDER NOTE - PROGRESS NOTE DETAILS
exertional O2 sat 96 percent patient understands if any increase in dyspnea, jaime, etc he is to return to ER.

## 2020-04-06 NOTE — ED PROVIDER NOTE - PATIENT PORTAL LINK FT
You can access the FollowMyHealth Patient Portal offered by [facility] by registering at the following website: [email or web address]. By joining [email or web address]’s FollowMyHealth portal, you will also be able to view your health information using other applications (apps) compatible with our system.

## 2020-04-06 NOTE — ED PROVIDER NOTE - ATTENDING CONTRIBUTION TO CARE
seen with acp  viral ysmptoms  o 2 sat acceptable with ambulation  pe as documented  agree with care and dispo I personally saw the patient with the PA, and completed the key components of the history and physical exam. I then discussed the management plan with the PA.   seen with acp  viral ysmptoms  o 2 sat acceptable with ambulation  pe as documented  agree with care and dispo

## 2020-04-07 ENCOUNTER — INPATIENT (INPATIENT)
Facility: HOSPITAL | Age: 70
LOS: 2 days | Discharge: HOME | DRG: 177 | End: 2020-04-10
Attending: INTERNAL MEDICINE | Admitting: STUDENT IN AN ORGANIZED HEALTH CARE EDUCATION/TRAINING PROGRAM
Payer: COMMERCIAL

## 2020-04-07 VITALS
DIASTOLIC BLOOD PRESSURE: 61 MMHG | SYSTOLIC BLOOD PRESSURE: 98 MMHG | RESPIRATION RATE: 25 BRPM | OXYGEN SATURATION: 91 % | HEIGHT: 66 IN | HEART RATE: 77 BPM | WEIGHT: 175.93 LBS | TEMPERATURE: 98 F

## 2020-04-07 DIAGNOSIS — Z98.890 OTHER SPECIFIED POSTPROCEDURAL STATES: Chronic | ICD-10-CM

## 2020-04-07 LAB
ALBUMIN SERPL ELPH-MCNC: 3.3 G/DL — SIGNIFICANT CHANGE UP (ref 3.3–5.2)
ALP SERPL-CCNC: 261 U/L — HIGH (ref 40–120)
ALT FLD-CCNC: 128 U/L — HIGH
ANION GAP SERPL CALC-SCNC: 15 MMOL/L — SIGNIFICANT CHANGE UP (ref 5–17)
AST SERPL-CCNC: 157 U/L — HIGH
BASOPHILS # BLD AUTO: 0.02 K/UL — SIGNIFICANT CHANGE UP (ref 0–0.2)
BASOPHILS NFR BLD AUTO: 0.3 % — SIGNIFICANT CHANGE UP (ref 0–2)
BILIRUB SERPL-MCNC: 1 MG/DL — SIGNIFICANT CHANGE UP (ref 0.4–2)
BUN SERPL-MCNC: 18 MG/DL — SIGNIFICANT CHANGE UP (ref 8–20)
CALCIUM SERPL-MCNC: 8.9 MG/DL — SIGNIFICANT CHANGE UP (ref 8.6–10.2)
CHLORIDE SERPL-SCNC: 98 MMOL/L — SIGNIFICANT CHANGE UP (ref 98–107)
CK SERPL-CCNC: 54 U/L — SIGNIFICANT CHANGE UP (ref 30–200)
CO2 SERPL-SCNC: 22 MMOL/L — SIGNIFICANT CHANGE UP (ref 22–29)
CREAT SERPL-MCNC: 1.09 MG/DL — SIGNIFICANT CHANGE UP (ref 0.5–1.3)
EOSINOPHIL # BLD AUTO: 0.06 K/UL — SIGNIFICANT CHANGE UP (ref 0–0.5)
EOSINOPHIL NFR BLD AUTO: 1 % — SIGNIFICANT CHANGE UP (ref 0–6)
GLUCOSE SERPL-MCNC: 228 MG/DL — HIGH (ref 70–99)
HCT VFR BLD CALC: 43.6 % — SIGNIFICANT CHANGE UP (ref 39–50)
HGB BLD-MCNC: 14.5 G/DL — SIGNIFICANT CHANGE UP (ref 13–17)
IMM GRANULOCYTES NFR BLD AUTO: 0.5 % — SIGNIFICANT CHANGE UP (ref 0–1.5)
LYMPHOCYTES # BLD AUTO: 0.87 K/UL — LOW (ref 1–3.3)
LYMPHOCYTES # BLD AUTO: 14.2 % — SIGNIFICANT CHANGE UP (ref 13–44)
MCHC RBC-ENTMCNC: 28.7 PG — SIGNIFICANT CHANGE UP (ref 27–34)
MCHC RBC-ENTMCNC: 33.3 GM/DL — SIGNIFICANT CHANGE UP (ref 32–36)
MCV RBC AUTO: 86.3 FL — SIGNIFICANT CHANGE UP (ref 80–100)
MONOCYTES # BLD AUTO: 0.49 K/UL — SIGNIFICANT CHANGE UP (ref 0–0.9)
MONOCYTES NFR BLD AUTO: 8 % — SIGNIFICANT CHANGE UP (ref 2–14)
NEUTROPHILS # BLD AUTO: 4.66 K/UL — SIGNIFICANT CHANGE UP (ref 1.8–7.4)
NEUTROPHILS NFR BLD AUTO: 76 % — SIGNIFICANT CHANGE UP (ref 43–77)
NT-PROBNP SERPL-SCNC: 109 PG/ML — SIGNIFICANT CHANGE UP (ref 0–300)
PLATELET # BLD AUTO: 146 K/UL — LOW (ref 150–400)
POTASSIUM SERPL-MCNC: 4.7 MMOL/L — SIGNIFICANT CHANGE UP (ref 3.5–5.3)
POTASSIUM SERPL-SCNC: 4.7 MMOL/L — SIGNIFICANT CHANGE UP (ref 3.5–5.3)
PROT SERPL-MCNC: 6.7 G/DL — SIGNIFICANT CHANGE UP (ref 6.6–8.7)
RBC # BLD: 5.05 M/UL — SIGNIFICANT CHANGE UP (ref 4.2–5.8)
RBC # FLD: 12.4 % — SIGNIFICANT CHANGE UP (ref 10.3–14.5)
SODIUM SERPL-SCNC: 135 MMOL/L — SIGNIFICANT CHANGE UP (ref 135–145)
TROPONIN T SERPL-MCNC: <0.01 NG/ML — SIGNIFICANT CHANGE UP (ref 0–0.06)
WBC # BLD: 6.13 K/UL — SIGNIFICANT CHANGE UP (ref 3.8–10.5)
WBC # FLD AUTO: 6.13 K/UL — SIGNIFICANT CHANGE UP (ref 3.8–10.5)

## 2020-04-07 PROCEDURE — 93010 ELECTROCARDIOGRAM REPORT: CPT

## 2020-04-07 PROCEDURE — 71045 X-RAY EXAM CHEST 1 VIEW: CPT | Mod: 26

## 2020-04-07 PROCEDURE — 99285 EMERGENCY DEPT VISIT HI MDM: CPT

## 2020-04-07 RX ORDER — ALBUTEROL 90 UG/1
6 AEROSOL, METERED ORAL EVERY 6 HOURS
Refills: 0 | Status: DISCONTINUED | OUTPATIENT
Start: 2020-04-07 | End: 2020-04-10

## 2020-04-07 RX ORDER — ACETAMINOPHEN 500 MG
650 TABLET ORAL ONCE
Refills: 0 | Status: COMPLETED | OUTPATIENT
Start: 2020-04-07 | End: 2020-04-07

## 2020-04-07 RX ORDER — IBUPROFEN 200 MG
200 TABLET ORAL ONCE
Refills: 0 | Status: DISCONTINUED | OUTPATIENT
Start: 2020-04-07 | End: 2020-04-07

## 2020-04-07 RX ADMIN — Medication 650 MILLIGRAM(S): at 21:45

## 2020-04-07 RX ADMIN — ALBUTEROL 6 PUFF(S): 90 AEROSOL, METERED ORAL at 22:15

## 2020-04-07 NOTE — ED PROVIDER NOTE - RESPIRATORY, MLM
NO RESP DISTRESS,  LUNGS with diffuse crackles,  RR 25, SPO2 90%, with activity 87% use of secondary muscles and retractions.

## 2020-04-07 NOTE — ED ADULT NURSE NOTE - NSIMPLEMENTINTERV_GEN_ALL_ED
Implemented All Universal Safety Interventions:  [place] to call system. Call bell, personal items and telephone within reach. Instruct patient to call for assistance. Room bathroom lighting operational. Non-slip footwear when patient is off stretcher. Physically safe environment: no spills, clutter or unnecessary equipment. Stretcher in lowest position, wheels locked, appropriate side rails in place.

## 2020-04-07 NOTE — ED PROVIDER NOTE - CLINICAL SUMMARY MEDICAL DECISION MAKING FREE TEXT BOX
PT with pos covid contact with progression of dz since last being seen in [facility], pt now hypoxic retracting using secondary muscles to breath, case discussed with hospitalist, that wants to have pt admitted for further evaluation and possible surgical intervention, pt made aware of need for admission and possible further treatments, pt states that they agree with need for admission and they understand all results and possible treatments. PT will be admitted to hospitalist team and care will be transferred to admitting team.

## 2020-04-07 NOTE — ED PROVIDER NOTE - OBJECTIVE STATEMENT
PT with SPMHX of CAD, DM, HTN presents to the ED with complaint of general viral illness x3 days. Pt states that she had gradual onset of symptoms that started X3 days ago spontaneously With significant sick contact of daughter who is COVID+. PT states that they have been having diffuse myalgia, non productive cough, and mild SOB that have today began to limit his ability to perform ADLs.  PT states that she has take OTC MEDs, With mild improvement. PT  PT admits to subjective fevers, dines HA, weakness, LOC, Vomiting, urinary symptoms.

## 2020-04-07 NOTE — ED ADULT NURSE NOTE - OBJECTIVE STATEMENT
pt presents to ed a7ox3, no acute distress c/o sob x 1 day with dry cough, subjective fever and fatigue x 3 days. reports daughter is COVID+.

## 2020-04-07 NOTE — ED PROVIDER NOTE - NS ED ROS FT
ROS: CONTUSIONAL: Denies chills, fatigue, wt loss. HEAD: Denies trauma, HA, Dizziness. EYE: Denies Acute visual changes, diplopia. ENMT: Denies change in hearing, tinnitus, epistaxis, difficulty swallowing, sore throat. CARDIO: Denies CP, palpitations, edema. RESP: Denies , Diff breathing, hemoptysis. GI: Denies N/V, ABD pain, change in bowel movement. URINARY: Denies difficulty urinating, pelvic pain. MS:  Denies joint pain, back pain, weakness, decreased ROM, swelling. NEURO: Denies change in gait, seizures, loss of sensation, dizziness, confusion LOC.  PSY: NO SI/HI.

## 2020-04-07 NOTE — ED PROCEDURE NOTE - ATTENDING CONTRIBUTION TO CARE
I personally saw the patient with the PA, and completed the key components of the history and physical exam. I then discussed the management plan with the PA.   agree with procedure as doceumnted

## 2020-04-08 DIAGNOSIS — R09.02 HYPOXEMIA: ICD-10-CM

## 2020-04-08 LAB
GLUCOSE BLDC GLUCOMTR-MCNC: 164 MG/DL — HIGH (ref 70–99)
GLUCOSE BLDC GLUCOMTR-MCNC: 173 MG/DL — HIGH (ref 70–99)
SARS-COV-2 RNA SPEC QL NAA+PROBE: DETECTED

## 2020-04-08 PROCEDURE — 99223 1ST HOSP IP/OBS HIGH 75: CPT

## 2020-04-08 RX ORDER — DEXTROSE 50 % IN WATER 50 %
25 SYRINGE (ML) INTRAVENOUS ONCE
Refills: 0 | Status: DISCONTINUED | OUTPATIENT
Start: 2020-04-08 | End: 2020-04-10

## 2020-04-08 RX ORDER — INSULIN GLARGINE 100 [IU]/ML
15 INJECTION, SOLUTION SUBCUTANEOUS AT BEDTIME
Refills: 0 | Status: DISCONTINUED | OUTPATIENT
Start: 2020-04-08 | End: 2020-04-10

## 2020-04-08 RX ORDER — HYDROXYCHLOROQUINE SULFATE 200 MG
200 TABLET ORAL EVERY 12 HOURS
Refills: 0 | Status: DISCONTINUED | OUTPATIENT
Start: 2020-04-09 | End: 2020-04-10

## 2020-04-08 RX ORDER — CLOPIDOGREL BISULFATE 75 MG/1
75 TABLET, FILM COATED ORAL DAILY
Refills: 0 | Status: DISCONTINUED | OUTPATIENT
Start: 2020-04-08 | End: 2020-04-10

## 2020-04-08 RX ORDER — SODIUM CHLORIDE 9 MG/ML
1000 INJECTION, SOLUTION INTRAVENOUS
Refills: 0 | Status: DISCONTINUED | OUTPATIENT
Start: 2020-04-08 | End: 2020-04-10

## 2020-04-08 RX ORDER — ASPIRIN/CALCIUM CARB/MAGNESIUM 324 MG
81 TABLET ORAL DAILY
Refills: 0 | Status: DISCONTINUED | OUTPATIENT
Start: 2020-04-08 | End: 2020-04-10

## 2020-04-08 RX ORDER — ATORVASTATIN CALCIUM 80 MG/1
40 TABLET, FILM COATED ORAL AT BEDTIME
Refills: 0 | Status: DISCONTINUED | OUTPATIENT
Start: 2020-04-08 | End: 2020-04-10

## 2020-04-08 RX ORDER — INSULIN LISPRO 100/ML
VIAL (ML) SUBCUTANEOUS
Refills: 0 | Status: DISCONTINUED | OUTPATIENT
Start: 2020-04-08 | End: 2020-04-10

## 2020-04-08 RX ORDER — ACETAMINOPHEN 500 MG
650 TABLET ORAL EVERY 4 HOURS
Refills: 0 | Status: DISCONTINUED | OUTPATIENT
Start: 2020-04-08 | End: 2020-04-10

## 2020-04-08 RX ORDER — DEXTROSE 50 % IN WATER 50 %
12.5 SYRINGE (ML) INTRAVENOUS ONCE
Refills: 0 | Status: DISCONTINUED | OUTPATIENT
Start: 2020-04-08 | End: 2020-04-10

## 2020-04-08 RX ORDER — DEXTROSE 50 % IN WATER 50 %
15 SYRINGE (ML) INTRAVENOUS ONCE
Refills: 0 | Status: DISCONTINUED | OUTPATIENT
Start: 2020-04-08 | End: 2020-04-10

## 2020-04-08 RX ORDER — ENOXAPARIN SODIUM 100 MG/ML
40 INJECTION SUBCUTANEOUS DAILY
Refills: 0 | Status: DISCONTINUED | OUTPATIENT
Start: 2020-04-08 | End: 2020-04-10

## 2020-04-08 RX ORDER — HYDROXYCHLOROQUINE SULFATE 200 MG
TABLET ORAL
Refills: 0 | Status: DISCONTINUED | OUTPATIENT
Start: 2020-04-08 | End: 2020-04-10

## 2020-04-08 RX ORDER — HYDROXYCHLOROQUINE SULFATE 200 MG
400 TABLET ORAL EVERY 12 HOURS
Refills: 0 | Status: COMPLETED | OUTPATIENT
Start: 2020-04-08 | End: 2020-04-08

## 2020-04-08 RX ORDER — GLUCAGON INJECTION, SOLUTION 0.5 MG/.1ML
1 INJECTION, SOLUTION SUBCUTANEOUS ONCE
Refills: 0 | Status: DISCONTINUED | OUTPATIENT
Start: 2020-04-08 | End: 2020-04-10

## 2020-04-08 RX ORDER — METOPROLOL TARTRATE 50 MG
25 TABLET ORAL
Refills: 0 | Status: DISCONTINUED | OUTPATIENT
Start: 2020-04-08 | End: 2020-04-10

## 2020-04-08 RX ADMIN — ALBUTEROL 6 PUFF(S): 90 AEROSOL, METERED ORAL at 10:52

## 2020-04-08 RX ADMIN — ALBUTEROL 6 PUFF(S): 90 AEROSOL, METERED ORAL at 16:17

## 2020-04-08 RX ADMIN — Medication 2: at 17:39

## 2020-04-08 RX ADMIN — Medication 25 MILLIGRAM(S): at 18:51

## 2020-04-08 RX ADMIN — Medication 400 MILLIGRAM(S): at 18:51

## 2020-04-08 RX ADMIN — ALBUTEROL 6 PUFF(S): 90 AEROSOL, METERED ORAL at 03:45

## 2020-04-08 RX ADMIN — Medication 81 MILLIGRAM(S): at 13:59

## 2020-04-08 RX ADMIN — ENOXAPARIN SODIUM 40 MILLIGRAM(S): 100 INJECTION SUBCUTANEOUS at 13:59

## 2020-04-08 RX ADMIN — Medication 2: at 12:37

## 2020-04-08 RX ADMIN — CLOPIDOGREL BISULFATE 75 MILLIGRAM(S): 75 TABLET, FILM COATED ORAL at 13:59

## 2020-04-08 RX ADMIN — Medication 600 MILLIGRAM(S): at 16:17

## 2020-04-08 RX ADMIN — Medication 400 MILLIGRAM(S): at 16:17

## 2020-04-08 NOTE — H&P ADULT - NSICDXPASTMEDICALHX_GEN_ALL_CORE_FT
PAST MEDICAL HISTORY:  CAD (coronary artery disease)     DM (diabetes mellitus)     HLD (hyperlipidemia)

## 2020-04-08 NOTE — H&P ADULT - NSHPSOCIALHISTORY_GEN_ALL_CORE
Social history:   -	Work: at the  of [employer]  -	Lives with: Wife, 3 kids and son-in-law  -	( former)Smoke history, (Social )Alcohol, ( - )Illicit Drug use   Advance Directives:   -	Health care discussion maker: Katharine Ruiz [phone]  -	Code Status: Full

## 2020-04-08 NOTE — ED ADULT NURSE REASSESSMENT NOTE - NS ED NURSE REASSESS COMMENT FT1
Report from Dayshift nurse, Patient A&Ox4, complaining of intermittent SOB. Patient maintaining sat above 93% on RA. VSS, respirations even and unlabored, skin warm pink and dry. Will continue to monitor.
pt ambulated to bathroom independently with steady gait noted. pt offers no complaints at this time. vitals remain stable. will continue to reassess.
Pt A&OX3, amb to BR with no assist, denies any pain.  Pt c/o cough and feeling weak.  CM in place, NSR.  VSS.  Will continue to monitor.
Pt received @0730, A&OX3, denies any pain.  Cough noted, clear bsb, abd soft nondistended, nontender, moving all ext well, no peripheral edema noted.  CM in place, NSR.  VSS.

## 2020-04-08 NOTE — H&P ADULT - ATTENDING COMMENTS
Delayed entry note: Pt admitted by tele-hospitalist then seen by me in early AM on 4/8/2020.  Agree with above plan as per tele-hospitalist. Explained pt being admitted for further monitoring of hypoxemic respiratory failure due to possible COVID PNA.  At time pt was seen COVID hadn't been ordered, was ordered by me and RN made aware to swab pt.

## 2020-04-08 NOTE — H&P ADULT - NSHPPHYSICALEXAM_GEN_ALL_CORE
Vital Signs Last 24 Hrs  T(C): 36.9 (07 Apr 2020 18:51), Max: 36.9 (07 Apr 2020 18:51)  T(F): 98.4 (07 Apr 2020 18:51), Max: 98.4 (07 Apr 2020 18:51)  HR: 73 (07 Apr 2020 21:41) (73 - 77)  BP: 109/62 (07 Apr 2020 21:41) (98/61 - 109/62)  BP(mean): --  RR: 28 (07 Apr 2020 21:41) (25 - 28)  SpO2: 91% (07 Apr 2020 21:41) (91% - 91%)    ED Physical examination assessment   · CONSTITUTIONAL: Well appearing, awake, alert, oriented to person, place, time/situation and in no apparent distress.  · EYES: Clear bilaterally, pupils equal, round and reactive to light.  · CARDIAC: Normal rate, regular rhythm.  Heart sounds S1, S2.  No murmurs, rubs or gallops.  · RESPIRATORY: NO RESP DISTRESS,  LUNGS with diffuse crackles,  RR 25, SPO2 90%, with activity 87% use of secondary muscles and retractions.  · GASTROINTESTINAL: Abdomen soft, non-tender, no guarding.  · MUSCULOSKELETAL: Spine appears normal, range of motion is not limited, no muscle or joint tenderness  · NEUROLOGICAL: Alert and oriented, no focal deficits, no motor or sensory deficits.  · SKIN: Skin normal color for race, warm, dry and intact. No evidence of rash.

## 2020-04-08 NOTE — H&P ADULT - HISTORY OF PRESENT ILLNESS
ED HPI:  PT with SPMHX of CAD, DM, HTN presents to the ED with complaint of general viral illness x3 days. Pt states that she had gradual onset of symptoms that started X3 days ago spontaneously With significant sick contact of daughter who is COVID+. PT states that they have been having diffuse myalgia, non-productive cough, and mild SOB that have today began to limit his ability to perform ADLs.  PT states that she has take OTC MEDs, With mild improvement. PT  PT admits to subjective fevers, dines HA, weakness, LOC, Vomiting, urinary symptoms.  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 Positive for Subjective fever, Myalgia, Dry Cough, SOB, Headache, and loose stools.

## 2020-04-08 NOTE — H&P ADULT - ASSESSMENT
PUI due to Acute hypoxic respiratory failure with Bilateral infiltrates, COVID-19 collected and Pending.     Admit to medical unit   Pulse ox monitoring Q6hrs  Supplemental oxygen as needed to keep Sao2>94%  Airborne and contact isolation  Limit IV hydration at this time.   Albuterol INH   Please eval EKG for QTC  Transaminases, 2/2 suspected covid19  VTe prophylaxis     h/o DMT2, with hyperglycemia on presentation. Riss, Accu check, hypoglycemia protocol, hold oral agents, start Lantus 12U hS, last A1C 10.7 (11/2019); ADA diet  h/o HLD, cont Statin, monitor liver function  h/o CAD with recent stent placement, cont aspirin and plavix, BB with holding parameters.     F/U CBC, BMP, Mg, Phos, CRP, CPK, ferritin, procal, LDH and liver panel in am

## 2020-04-09 ENCOUNTER — TRANSCRIPTION ENCOUNTER (OUTPATIENT)
Age: 70
End: 2020-04-09

## 2020-04-09 LAB
ALBUMIN SERPL ELPH-MCNC: 3.2 G/DL — LOW (ref 3.3–5.2)
ALP SERPL-CCNC: 292 U/L — HIGH (ref 40–120)
ALT FLD-CCNC: 99 U/L — HIGH
AST SERPL-CCNC: 79 U/L — HIGH
BASOPHILS # BLD AUTO: 0.02 K/UL — SIGNIFICANT CHANGE UP (ref 0–0.2)
BASOPHILS NFR BLD AUTO: 0.3 % — SIGNIFICANT CHANGE UP (ref 0–2)
BILIRUB SERPL-MCNC: 0.8 MG/DL — SIGNIFICANT CHANGE UP (ref 0.4–2)
BUN SERPL-MCNC: 20 MG/DL — SIGNIFICANT CHANGE UP (ref 8–20)
CALCIUM SERPL-MCNC: 9 MG/DL — SIGNIFICANT CHANGE UP (ref 8.6–10.2)
CHLORIDE SERPL-SCNC: 97 MMOL/L — LOW (ref 98–107)
CO2 SERPL-SCNC: 27 MMOL/L — SIGNIFICANT CHANGE UP (ref 22–29)
CREAT SERPL-MCNC: 0.94 MG/DL — SIGNIFICANT CHANGE UP (ref 0.5–1.3)
EOSINOPHIL # BLD AUTO: 0.1 K/UL — SIGNIFICANT CHANGE UP (ref 0–0.5)
EOSINOPHIL NFR BLD AUTO: 1.6 % — SIGNIFICANT CHANGE UP (ref 0–6)
GLUCOSE BLDC GLUCOMTR-MCNC: 131 MG/DL — HIGH (ref 70–99)
GLUCOSE BLDC GLUCOMTR-MCNC: 166 MG/DL — HIGH (ref 70–99)
GLUCOSE BLDC GLUCOMTR-MCNC: 204 MG/DL — HIGH (ref 70–99)
GLUCOSE SERPL-MCNC: 92 MG/DL — SIGNIFICANT CHANGE UP (ref 70–99)
HBA1C BLD-MCNC: 10.7 % — HIGH (ref 4–5.6)
HCT VFR BLD CALC: 42.4 % — SIGNIFICANT CHANGE UP (ref 39–50)
HGB BLD-MCNC: 13.7 G/DL — SIGNIFICANT CHANGE UP (ref 13–17)
IMM GRANULOCYTES NFR BLD AUTO: 0.8 % — SIGNIFICANT CHANGE UP (ref 0–1.5)
LYMPHOCYTES # BLD AUTO: 0.82 K/UL — LOW (ref 1–3.3)
LYMPHOCYTES # BLD AUTO: 13.4 % — SIGNIFICANT CHANGE UP (ref 13–44)
MAGNESIUM SERPL-MCNC: 2.2 MG/DL — SIGNIFICANT CHANGE UP (ref 1.8–2.6)
MCHC RBC-ENTMCNC: 28.5 PG — SIGNIFICANT CHANGE UP (ref 27–34)
MCHC RBC-ENTMCNC: 32.3 GM/DL — SIGNIFICANT CHANGE UP (ref 32–36)
MCV RBC AUTO: 88.3 FL — SIGNIFICANT CHANGE UP (ref 80–100)
MONOCYTES # BLD AUTO: 0.64 K/UL — SIGNIFICANT CHANGE UP (ref 0–0.9)
MONOCYTES NFR BLD AUTO: 10.5 % — SIGNIFICANT CHANGE UP (ref 2–14)
NEUTROPHILS # BLD AUTO: 4.49 K/UL — SIGNIFICANT CHANGE UP (ref 1.8–7.4)
NEUTROPHILS NFR BLD AUTO: 73.4 % — SIGNIFICANT CHANGE UP (ref 43–77)
PLATELET # BLD AUTO: 193 K/UL — SIGNIFICANT CHANGE UP (ref 150–400)
POTASSIUM SERPL-MCNC: 5.2 MMOL/L — SIGNIFICANT CHANGE UP (ref 3.5–5.3)
POTASSIUM SERPL-SCNC: 5.2 MMOL/L — SIGNIFICANT CHANGE UP (ref 3.5–5.3)
PROT SERPL-MCNC: 6.2 G/DL — LOW (ref 6.6–8.7)
RBC # BLD: 4.8 M/UL — SIGNIFICANT CHANGE UP (ref 4.2–5.8)
RBC # FLD: 12.3 % — SIGNIFICANT CHANGE UP (ref 10.3–14.5)
SODIUM SERPL-SCNC: 137 MMOL/L — SIGNIFICANT CHANGE UP (ref 135–145)
WBC # BLD: 6.12 K/UL — SIGNIFICANT CHANGE UP (ref 3.8–10.5)
WBC # FLD AUTO: 6.12 K/UL — SIGNIFICANT CHANGE UP (ref 3.8–10.5)

## 2020-04-09 PROCEDURE — 99239 HOSP IP/OBS DSCHRG MGMT >30: CPT

## 2020-04-09 RX ORDER — ALBUTEROL 90 UG/1
2 AEROSOL, METERED ORAL
Qty: 1 | Refills: 0
Start: 2020-04-09

## 2020-04-09 RX ORDER — HYDROXYCHLOROQUINE SULFATE 200 MG
1 TABLET ORAL
Qty: 7 | Refills: 0
Start: 2020-04-09 | End: 2020-04-11

## 2020-04-09 RX ORDER — HYDROXYCHLOROQUINE SULFATE 200 MG
1 TABLET ORAL
Qty: 0 | Refills: 0 | DISCHARGE
Start: 2020-04-09

## 2020-04-09 RX ORDER — RIVAROXABAN 15 MG-20MG
1 KIT ORAL
Qty: 39 | Refills: 0
Start: 2020-04-09 | End: 2020-05-17

## 2020-04-09 RX ORDER — HYDROXYCHLOROQUINE SULFATE 200 MG
1 TABLET ORAL
Qty: 7 | Refills: 0
Start: 2020-04-09

## 2020-04-09 RX ADMIN — ENOXAPARIN SODIUM 40 MILLIGRAM(S): 100 INJECTION SUBCUTANEOUS at 11:39

## 2020-04-09 RX ADMIN — Medication 81 MILLIGRAM(S): at 11:39

## 2020-04-09 RX ADMIN — ATORVASTATIN CALCIUM 40 MILLIGRAM(S): 80 TABLET, FILM COATED ORAL at 22:39

## 2020-04-09 RX ADMIN — CLOPIDOGREL BISULFATE 75 MILLIGRAM(S): 75 TABLET, FILM COATED ORAL at 11:39

## 2020-04-09 RX ADMIN — Medication 200 MILLIGRAM(S): at 18:49

## 2020-04-09 RX ADMIN — Medication 200 MILLIGRAM(S): at 05:50

## 2020-04-09 RX ADMIN — INSULIN GLARGINE 15 UNIT(S): 100 INJECTION, SOLUTION SUBCUTANEOUS at 23:31

## 2020-04-09 RX ADMIN — Medication 2: at 18:49

## 2020-04-09 RX ADMIN — Medication 4: at 23:34

## 2020-04-09 RX ADMIN — Medication 25 MILLIGRAM(S): at 05:51

## 2020-04-09 RX ADMIN — Medication 25 MILLIGRAM(S): at 18:50

## 2020-04-09 RX ADMIN — ALBUTEROL 6 PUFF(S): 90 AEROSOL, METERED ORAL at 05:50

## 2020-04-09 NOTE — DISCHARGE NOTE PROVIDER - NSDCMRMEDTOKEN_GEN_ALL_CORE_FT
albuterol 90 mcg/inh inhalation aerosol: 2 puff(s) inhaled every 6 hours, As Needed -for bronchospasm   aspirin 81 mg oral tablet, chewable: 1 tab(s) orally once a day  clopidogrel 75 mg oral tablet: 1 tab(s) orally once a day  Farxiga 5 mg oral tablet: 1 tab(s) orally once a day   fenofibrate 145 mg oral tablet: 1 tab(s) orally once a day  guaiFENesin 600 mg oral tablet, extended release: 1 tab(s) orally every 12 hours, As needed, Cough  hydroxychloroquine 200 mg oral tablet: 1 tab(s) orally 2 times a day  Lantus Solostar Pen 100 units/mL subcutaneous solution: 12 unit(s) subcutaneous once a day   Lipitor 40 mg oral tablet: 1 tab(s) orally once a day  metFORMIN 1000 mg oral tablet, extended release: 1 tab(s) orally 2 times a day  metoprolol tartrate 25 mg oral tablet: 1 tab(s) orally 2 times a day  Xarelto 10 mg oral tablet: 1 tab(s) orally once a day albuterol 90 mcg/inh inhalation aerosol: 2 puff(s) inhaled every 6 hours, As Needed -for bronchospasm   aspirin 81 mg oral tablet, chewable: 1 tab(s) orally once a day  clopidogrel 75 mg oral tablet: 1 tab(s) orally once a day  Farxiga 5 mg oral tablet: 1 tab(s) orally once a day   fenofibrate 145 mg oral tablet: 1 tab(s) orally once a day  guaiFENesin 600 mg oral tablet, extended release: 1 tab(s) orally every 12 hours, As needed, Cough  hydroxychloroquine 200 mg oral tablet: 1 tab(s) orally 2 times a day   Lantus Solostar Pen 100 units/mL subcutaneous solution: 12 unit(s) subcutaneous once a day   Lipitor 40 mg oral tablet: 1 tab(s) orally once a day  metFORMIN 1000 mg oral tablet, extended release: 1 tab(s) orally 2 times a day  metoprolol tartrate 25 mg oral tablet: 1 tab(s) orally 2 times a day  Xarelto 10 mg oral tablet: 1 tab(s) orally once a day

## 2020-04-09 NOTE — DISCHARGE NOTE PROVIDER - NSDCFUADDAPPT_GEN_ALL_CORE_FT
It has been determined that you no longer need hospitalization and can recover while remaining in self-quarantine at home for 14 days. You should follow the prevention steps below until a healthcare provider or [facility] says you can return to your normal activities.  Please remain in quarantine until then. You should restrict activities outside your home except for getting medical care.  Do not go to work, school or public areas.  Avoid using public transportation.  Separate yourself from other people and animals in your home.  Cover your coughs and sneezes.  Clean your hands often. Avoid sharing personal household items. Clean all high touch surfaces. Monitor your symptoms, if you have a medical emergency call 911.

## 2020-04-09 NOTE — DISCHARGE NOTE NURSING/CASE MANAGEMENT/SOCIAL WORK - PATIENT PORTAL LINK FT
You can access the FollowMyHealth Patient Portal offered by [facility] by registering at the following website: [email or web address]. By joining [email or web address]’s FollowMyHealth portal, you will also be able to view your health information using other applications (apps) compatible with our system.

## 2020-04-09 NOTE — DISCHARGE NOTE PROVIDER - NSDCCPCAREPLAN_GEN_ALL_CORE_FT
PRINCIPAL DISCHARGE DIAGNOSIS  Diagnosis: Hypoxemia  Assessment and Plan of Treatment: resolved      SECONDARY DISCHARGE DIAGNOSES  Diagnosis: COVID-19 virus infection  Assessment and Plan of Treatment: Complete plaquenil as prescribed PRINCIPAL DISCHARGE DIAGNOSIS  Diagnosis: Hypoxemia  Assessment and Plan of Treatment: resolved      SECONDARY DISCHARGE DIAGNOSES  Diagnosis: COVID-19 virus infection  Assessment and Plan of Treatment: Complete plaquenil as prescribed  It has been determined that you no longer need hospitalization and can recover while remaining in self-quarantine at home for 14 days. You should follow the prevention steps below until a healthcare provider or [facility] says you can return to your normal activities.  Please remain in quarantine until then. You should restrict activities outside your home except for getting medical care.  Do not go to work, school or public areas.  Avoid using public transportation.  Separate yourself from other people and animals in your home.  Cover your coughs and sneezes.  Clean your hands often. Avoid sharing personal household items. Clean all high touch surfaces. Monitor your symptoms, if you have a medical emergency call 911.

## 2020-04-09 NOTE — PROGRESS NOTE ADULT - ASSESSMENT
PUI due to Acute hypoxic respiratory failure with Bilateral infiltrates, COVID-19 collected and Pending.     Admit to medical unit   - Albuterol INH   - cont plaquenil  - hold abx  - VTe prophylaxis     h/o DMT2, with hyperglycemia on presentation. Riss, Accu check, hypoglycemia protocol, hold oral agents, start Lantus 12U hS, last A1C 10.7 (11/2019); ADA diet    h/o HLD, cont Statin, monitor liver function    h/o CAD with recent stent placement, cont aspirin and plavix, BB with holding parameters.     DISPO: discharge today; will send scripts to [facility]

## 2020-04-09 NOTE — DISCHARGE NOTE NURSING/CASE MANAGEMENT/SOCIAL WORK - NSDCFUADDAPPT_GEN_ALL_CORE_FT
It has been determined that you no longer need hospitalization and can recover while remaining in self-quarantine at home for 14 days. You should follow the prevention steps below until a healthcare provider or [facility] says you can return to your normal activities.  Please remain in quarantine until then. You should restrict activities outside your home except for getting medical care.  Do not go to work, school or public areas.  Avoid using public transportation.  Separate yourself from other people and animals in your home.  Cover your coughs and sneezes.  Clean your hands often. Avoid sharing personal household items. Clean all high touch surfaces. Monitor your symptoms, if you have a medical emergency call 911.

## 2020-04-09 NOTE — DISCHARGE NOTE PROVIDER - NSDCFUSCHEDAPPT_GEN_ALL_CORE_FT
VINOD YUNG ; 04/30/2020 ; [facility] [address] [facility] VINOD YUNG ; 04/30/2020 ; [facility] [address] [facility] VINOD YUNG ; 04/30/2020 ; [facility] [address] Roberto

## 2020-04-09 NOTE — PROGRESS NOTE ADULT - SUBJECTIVE AND OBJECTIVE BOX
CC: r/o COVID19, acute hypoxic resp failure. (09 Apr 2020 13:23)    INTERVAL EVENTS:  Patient without complaints, mild cough  ambulates off oxygen with desaturation  agrees to discharge today    I&O's Detail                        13.7   6.12  )-----------( 193      ( 09 Apr 2020 12:56 )             42.4     09 Apr 2020 12:56    137    |  97     |  20.0   ----------------------------<  92     5.2     |  27.0   |  0.94     Ca    9.0        09 Apr 2020 12:56  Mg     2.2       09 Apr 2020 12:56    TPro  6.2    /  Alb  3.2    /  TBili  0.8    /  DBili  x      /  AST  79     /  ALT  99     /  AlkPhos  292    09 Apr 2020 12:56      CAPILLARY BLOOD GLUCOSE  POCT Blood Glucose.: 132 mg/dL (10 Apr 2020 05:06)  POCT Blood Glucose.: 204 mg/dL (09 Apr 2020 21:32)  POCT Blood Glucose.: 166 mg/dL (09 Apr 2020 18:08)  POCT Blood Glucose.: 131 mg/dL (09 Apr 2020 11:36)    LIVER FUNCTIONS - ( 09 Apr 2020 12:56 )  Alb: 3.2 g/dL / Pro: 6.2 g/dL / ALK PHOS: 292 U/L / ALT: 99 U/L / AST: 79 U/L / GGT: x           Hemoglobin A1C, Whole Blood: 10.7 % (04-09-20 @ 12:56)    MEDICATIONS  (STANDING):  ALBUTerol    90 MICROgram(s) HFA Inhaler 6 Puff(s) Inhalation every 6 hours  aspirin  chewable 81 milliGRAM(s) Oral daily  atorvastatin 40 milliGRAM(s) Oral at bedtime  clopidogrel Tablet 75 milliGRAM(s) Oral daily  dextrose 5%. 1000 milliLiter(s) (50 mL/Hr) IV Continuous <Continuous>  dextrose 50% Injectable 12.5 Gram(s) IV Push once  dextrose 50% Injectable 25 Gram(s) IV Push once  dextrose 50% Injectable 25 Gram(s) IV Push once  enoxaparin Injectable 40 milliGRAM(s) SubCutaneous daily  hydroxychloroquine   Oral   hydroxychloroquine 200 milliGRAM(s) Oral every 12 hours  insulin glargine Injectable (LANTUS) 15 Unit(s) SubCutaneous at bedtime  insulin lispro (HumaLOG) corrective regimen sliding scale   SubCutaneous Before meals and at bedtime  metoprolol tartrate 25 milliGRAM(s) Oral two times a day    MEDICATIONS  (PRN):  acetaminophen   Tablet .. 650 milliGRAM(s) Oral every 4 hours PRN Temp greater or equal to 38.5C (101.3F)  dextrose 40% Gel 15 Gram(s) Oral once PRN Blood Glucose LESS THAN 70 milliGRAM(s)/deciliter  glucagon  Injectable 1 milliGRAM(s) IntraMuscular once PRN Glucose LESS THAN 70 milligrams/deciliter  guaiFENesin  milliGRAM(s) Oral every 12 hours PRN Cough      RADIOLOGY & ADDITIONAL TESTS:

## 2020-04-09 NOTE — DISCHARGE NOTE PROVIDER - HOSPITAL COURSE
Patient with history of CAD, DM, HTN presents to the ED with complaint of general viral illness x3 days. Pt states that she had gradual onset of symptoms that started X3 days ago spontaneously With significant sick contact of daughter who is COVID+. PT states that they have been having diffuse myalgia, non-productive cough, and mild SOB that have today began to limit his ability to perform ADLs.  PT states that she has take OTC MEDs, With mild improvement. PT  PT admits to subjective fevers, dines HA, weakness, LOC, Vomiting, urinary symptoms. Patient was admitted overnight with oxygen requirement. This morning he is off oxygen and requires none on ambulation        Vital Signs Last 24 Hrs    T(C): 37.2 (09 Apr 2020 08:54), Max: 37.2 (09 Apr 2020 08:54)    T(F): 99 (09 Apr 2020 08:54), Max: 99 (09 Apr 2020 08:54)    HR: 74 (09 Apr 2020 08:54) (69 - 82)    BP: 108/66 (09 Apr 2020 08:54) (108/66 - 129/80)    BP(mean): --    RR: 23 (09 Apr 2020 08:54) (20 - 25)    SpO2: 97% (09 Apr 2020 08:54) (93% - 99%)        PHYSICAL EXAM:    General: Well developed; well nourished; in no acute distress    Extremities: Normal range of motion, No clubbing, cyanosis or edema    Vascular: Peripheral pulses palpable 2+ bilaterally    Neurological: Alert and oriented x4    Psychiatric: Cooperative and appropriate        discharge time 35 minutes

## 2020-04-10 VITALS
OXYGEN SATURATION: 95 % | SYSTOLIC BLOOD PRESSURE: 107 MMHG | HEART RATE: 67 BPM | RESPIRATION RATE: 20 BRPM | TEMPERATURE: 98 F | DIASTOLIC BLOOD PRESSURE: 66 MMHG

## 2020-04-10 LAB
CRP SERPL-MCNC: 2.58 MG/DL — HIGH (ref 0–0.4)
GLUCOSE BLDC GLUCOMTR-MCNC: 123 MG/DL — HIGH (ref 70–99)
GLUCOSE BLDC GLUCOMTR-MCNC: 132 MG/DL — HIGH (ref 70–99)
LDH SERPL L TO P-CCNC: 357 U/L — HIGH (ref 98–192)
PROCALCITONIN SERPL-MCNC: 0.11 NG/ML — HIGH (ref 0.02–0.1)

## 2020-04-10 PROCEDURE — 82962 GLUCOSE BLOOD TEST: CPT

## 2020-04-10 PROCEDURE — 99232 SBSQ HOSP IP/OBS MODERATE 35: CPT

## 2020-04-10 PROCEDURE — 99285 EMERGENCY DEPT VISIT HI MDM: CPT

## 2020-04-10 PROCEDURE — 80053 COMPREHEN METABOLIC PANEL: CPT

## 2020-04-10 PROCEDURE — 94640 AIRWAY INHALATION TREATMENT: CPT

## 2020-04-10 PROCEDURE — 36415 COLL VENOUS BLD VENIPUNCTURE: CPT

## 2020-04-10 PROCEDURE — 87635 SARS-COV-2 COVID-19 AMP PRB: CPT

## 2020-04-10 PROCEDURE — 83615 LACTATE (LD) (LDH) ENZYME: CPT

## 2020-04-10 PROCEDURE — 82550 ASSAY OF CK (CPK): CPT

## 2020-04-10 PROCEDURE — 84484 ASSAY OF TROPONIN QUANT: CPT

## 2020-04-10 PROCEDURE — 86140 C-REACTIVE PROTEIN: CPT

## 2020-04-10 PROCEDURE — 84145 PROCALCITONIN (PCT): CPT

## 2020-04-10 PROCEDURE — 83735 ASSAY OF MAGNESIUM: CPT

## 2020-04-10 PROCEDURE — 83880 ASSAY OF NATRIURETIC PEPTIDE: CPT

## 2020-04-10 PROCEDURE — 85027 COMPLETE CBC AUTOMATED: CPT

## 2020-04-10 PROCEDURE — 83036 HEMOGLOBIN GLYCOSYLATED A1C: CPT

## 2020-04-10 PROCEDURE — 93005 ELECTROCARDIOGRAM TRACING: CPT

## 2020-04-10 PROCEDURE — 71045 X-RAY EXAM CHEST 1 VIEW: CPT

## 2020-04-10 RX ADMIN — Medication 650 MILLIGRAM(S): at 06:20

## 2020-04-10 RX ADMIN — Medication 200 MILLIGRAM(S): at 06:17

## 2020-04-10 RX ADMIN — ALBUTEROL 6 PUFF(S): 90 AEROSOL, METERED ORAL at 06:12

## 2020-04-10 RX ADMIN — CLOPIDOGREL BISULFATE 75 MILLIGRAM(S): 75 TABLET, FILM COATED ORAL at 09:36

## 2020-04-10 RX ADMIN — ALBUTEROL 6 PUFF(S): 90 AEROSOL, METERED ORAL at 06:11

## 2020-04-10 RX ADMIN — Medication 25 MILLIGRAM(S): at 06:17

## 2020-04-10 RX ADMIN — ENOXAPARIN SODIUM 40 MILLIGRAM(S): 100 INJECTION SUBCUTANEOUS at 09:35

## 2020-04-10 RX ADMIN — Medication 81 MILLIGRAM(S): at 09:36

## 2020-04-26 ENCOUNTER — MESSAGE (OUTPATIENT)
Age: 70
End: 2020-04-26

## 2020-04-30 ENCOUNTER — APPOINTMENT (OUTPATIENT)
Dept: CARDIOLOGY | Facility: CLINIC | Age: 70
End: 2020-04-30

## 2020-04-30 DIAGNOSIS — U07.1 COVID-19: ICD-10-CM

## 2020-05-05 LAB
SARS-COV-2 IGG SERPL IA-ACNC: 5.2 INDEX
SARS-COV-2 IGG SERPL QL IA: POSITIVE

## 2020-05-11 NOTE — ED ADULT NURSE NOTE - CHPI ED NUR SYMPTOMS POS
impaired left lateral movement/impaired protrusion/impaired anterior elevation
CHEST PAIN/SHORTNESS OF BREATH/burning sensation in chest

## 2020-06-18 PROBLEM — U07.1 COVID-19: Status: ACTIVE | Noted: 2020-04-30

## 2021-04-14 ENCOUNTER — NON-APPOINTMENT (OUTPATIENT)
Age: 71
End: 2021-04-14

## 2021-04-15 ENCOUNTER — NON-APPOINTMENT (OUTPATIENT)
Age: 71
End: 2021-04-15

## 2021-04-15 ENCOUNTER — APPOINTMENT (OUTPATIENT)
Dept: CARDIOLOGY | Facility: CLINIC | Age: 71
End: 2021-04-15
Payer: COMMERCIAL

## 2021-04-15 VITALS
DIASTOLIC BLOOD PRESSURE: 72 MMHG | WEIGHT: 174 LBS | HEART RATE: 76 BPM | OXYGEN SATURATION: 95 % | HEIGHT: 66 IN | SYSTOLIC BLOOD PRESSURE: 120 MMHG | BODY MASS INDEX: 27.97 KG/M2 | TEMPERATURE: 98 F

## 2021-04-15 VITALS — SYSTOLIC BLOOD PRESSURE: 130 MMHG | DIASTOLIC BLOOD PRESSURE: 75 MMHG

## 2021-04-15 DIAGNOSIS — Z83.79 FAMILY HISTORY OF OTHER DISEASES OF THE DIGESTIVE SYSTEM: ICD-10-CM

## 2021-04-15 DIAGNOSIS — Z78.9 OTHER SPECIFIED HEALTH STATUS: ICD-10-CM

## 2021-04-15 DIAGNOSIS — R94.39 ABNORMAL RESULT OF OTHER CARDIOVASCULAR FUNCTION STUDY: ICD-10-CM

## 2021-04-15 DIAGNOSIS — Z82.3 FAMILY HISTORY OF STROKE: ICD-10-CM

## 2021-04-15 DIAGNOSIS — Z72.89 OTHER PROBLEMS RELATED TO LIFESTYLE: ICD-10-CM

## 2021-04-15 DIAGNOSIS — Z87.891 PERSONAL HISTORY OF NICOTINE DEPENDENCE: ICD-10-CM

## 2021-04-15 PROBLEM — I25.10 ATHEROSCLEROTIC HEART DISEASE OF NATIVE CORONARY ARTERY WITHOUT ANGINA PECTORIS: Chronic | Status: ACTIVE | Noted: 2020-04-07

## 2021-04-15 PROCEDURE — 99072 ADDL SUPL MATRL&STAF TM PHE: CPT

## 2021-04-15 PROCEDURE — 99215 OFFICE O/P EST HI 40 MIN: CPT

## 2021-04-15 PROCEDURE — 93000 ELECTROCARDIOGRAM COMPLETE: CPT

## 2021-04-15 RX ORDER — METFORMIN HYDROCHLORIDE 500 MG/1
500 TABLET, COATED ORAL
Qty: 60 | Refills: 0 | Status: ACTIVE | COMMUNITY

## 2021-04-15 RX ORDER — METOPROLOL TARTRATE 25 MG/1
25 TABLET, FILM COATED ORAL
Qty: 60 | Refills: 5 | Status: ACTIVE | COMMUNITY

## 2021-04-15 RX ORDER — FENOFIBRATE 145 MG/1
145 TABLET, COATED ORAL
Qty: 90 | Refills: 2 | Status: ACTIVE | COMMUNITY
Start: 2021-04-15

## 2021-04-15 RX ORDER — ATORVASTATIN CALCIUM 20 MG/1
20 TABLET, FILM COATED ORAL
Qty: 30 | Refills: 4 | Status: ACTIVE | COMMUNITY

## 2021-04-15 RX ORDER — CLOPIDOGREL 75 MG/1
75 TABLET, FILM COATED ORAL DAILY
Refills: 1 | Status: ACTIVE | COMMUNITY

## 2021-04-15 NOTE — PHYSICAL EXAM
[General Appearance - Well Developed] : well developed [Heart Rate And Rhythm] : heart rate and rhythm were normal [] : no respiratory distress [Bowel Sounds] : normal bowel sounds [Abnormal Walk] : normal gait [Nail Clubbing] : no clubbing of the fingernails [Skin Color & Pigmentation] : normal skin color and pigmentation [Oriented To Time, Place, And Person] : oriented to person, place, and time

## 2021-04-15 NOTE — DISCUSSION/SUMMARY
[FreeTextEntry1] : 1. CAD: On aspirin 81 mg. On beta blocker. Symptomatic chest pain. INtermediate risk nuclear stress test. Needs LHC. \par 2. HlD: on statin therapy. \par 3. DMII: On Farxiga, metformin. \par 4. Follow up in 4 weeks.

## 2021-04-15 NOTE — HISTORY OF PRESENT ILLNESS
[FreeTextEntry1] : Patient with history of CAD with PCI to LAD and RCA in 2017 presenting with symptoms of chest pain and abnormal EKG. \par 2-3 weeks prior had left sided chest pain, felt shortness of breath, no n/v/d. \par Non-compliant with medications. Seen by Dr. Villegas. \par HgA1c per patient ~15. \par Underwent nuclear stress testing- noted to have ischemia in the anterior wall.

## 2021-04-24 DIAGNOSIS — Z01.818 ENCOUNTER FOR OTHER PREPROCEDURAL EXAMINATION: ICD-10-CM

## 2021-04-25 ENCOUNTER — APPOINTMENT (OUTPATIENT)
Dept: DISASTER EMERGENCY | Facility: CLINIC | Age: 71
End: 2021-04-25

## 2021-04-26 LAB — SARS-COV-2 N GENE NPH QL NAA+PROBE: NOT DETECTED

## 2021-04-27 NOTE — H&P PST ADULT - ASSESSMENT
A 70 year old gentleman presenting to [facility] for a C with Dr Clifton Katz secondary to abnormal nuclear stress  test, recent left sided chest pain, SOB,  and PMH of CAD-requiring PCI to RCA ( on DAPT) and LAD in 2017. Pt also has a h/o Type 2 DM, HLD, & non-compliance,    PRE-PROCEDURE ASSESSMENT  -NPO after midnight   -IV insert  -Patient seen and examined  -Labs reviewed  -Pre-procedure teaching completed with patient   -consent obtained   -Questions answered    ASA-  MALL-  GFR-  BRA-  COVID Negative 4/26   A 70 year old gentleman presenting to [facility] for a C with Dr Clifton Katz secondary to abnormal nuclear stress  test, recent left sided chest pain, SOB,  and PMH of CAD-requiring PCI to RCA ( on DAPT) and LAD in 2017. Pt also has a h/o Type 2 DM, HLD    PRE-PROCEDURE ASSESSMENT  -NPO after midnight   -IV insert  -Patient seen and examined  -Labs reviewed  -Pre-procedure teaching completed with patient   -consent obtained   -Questions answered    ASA-2  MALL-2  GFR-  BRA-  COVID Negative 4/26   A 70 year old gentleman presenting to [facility] for a C with Dr Clifton Katz secondary to abnormal nuclear stress  test, recent left sided chest pain, SOB,  and PMH of CAD-requiring PCI to RCA ( on DAPT) and LAD in 2017. Pt also has a h/o Type 2 DM, HLD    PRE-PROCEDURE ASSESSMENT  -NPO after midnight   -IV insert  -Patient seen and examined  -Labs reviewed  -Pre-procedure teaching completed with patient   -consent obtained   -Questions answered    ASA-2  MALL-2  GFR-52  BRA-1.4%  Cr 1.37  COVID Negative 4/26

## 2021-04-27 NOTE — H&P PST ADULT - HISTORY OF PRESENT ILLNESS
Narrative: This is a 70 year old gentleman presenting to [facility] for a [facility] with Dr Clifton Katz secondary to abnormal nuclear stress  test, left sided chest pain, SOB,  and PMH of CAD-requiring PCI to RCA ( on DAPT) and LAD in 2017. Pt also has a h/o Type 2 DM, HLD, & non-compliance,    Symptoms:        Angina (Class): 3       Ischemic Symptoms: CP, SOB    Heart Failure:        Systolic/Diastolic/Combined:        NYHA Class (within 2 weeks):     Assessment of LVEF (Must be within 6 months):       EF: 73%       Assessed by: NST       Date: 4/08/21  EKG-4/2021-Sinus with IVCD  Prior Cardiac Interventions (LHC, stents, CABG):       PCI's (Date, Stents, Vessels): 2017-RCA and LAD       CABG (Date, Grafts):     Noninvasive Testing:   Stress Test: Date:        Protocol:        Duration of Exercise:        Symptoms:        EKG Changes:        DTS:        Myocardial Imaging :04/08/21- LV myocardial perfusion was abnormal. Global LV function was normal. LV regional wall thickening was abnormal. EF-73%       Risk Assessment (Low, Medium, High): Medium    Echo (Date, Findings):     Antianginal Therapies:        Beta Blockers:  Metoprolol 25 mg po BID       Calcium Channel Blockers:        Long Acting Nitrates:        Ranexa:     Associated Risk Factors:        Cerebrovascular Disease: N/A       Chronic Lung Disease: N/A       Peripheral Arterial Disease: N/A       Chronic Kidney Disease (if yes, what is GFR): N/A       Uncontrolled Diabetes (if yes, what is HgbA1C or FBS): N/A       Poorly Controlled Hypertension (if yes, what is SBP): N/A       Morbid Obesity (if yes, what is BMI): N/A       History of Recent Ventricular Arrhythmia: N/A       Inability to Ambulate Safely: N/A       Need for Therapeutic Anticoagulation: N/A       Antiplatelet or Contrast Allergy: N/A Narrative: This is a 70 year old gentleman presenting to [facility] for a C with Dr Clifton Katz secondary to abnormal nuclear stress  test, left sided chest pain, SOB,  and PMH of CAD-requiring PCI to RCA ( on DAPT) and LAD in 2017. Pt also has a h/o Type 2 DM, HLD,    Symptoms:        Angina (Class): 3       Ischemic Symptoms: CP, SOB    Heart Failure:        Systolic/Diastolic/Combined: NA       NYHA Class (within 2 weeks):     Assessment of LVEF (Must be within 6 months):       EF: 73%       Assessed by: NST       Date: 4/08/21  EKG-4/2021-Sinus with IVCD  Prior Cardiac Interventions (LHC, stents, CABG):       PCI's (Date, Stents, Vessels): 2017-RCA and LAD       CABG (Date, Grafts): NA    Noninvasive Testing:   Stress Test: Date: 4/8/21       Protocol: Zheng       Duration of Exercise: 3 mins and 6 secs       Symptoms: Dyspnea       EKG Changes: upsloping ST seg depressions in inferior and lateral leads       DTS: -0.89       Myocardial Imaging :04/08/21- LV myocardial perfusion was abnormal with a small sized, mild-mod severity, completely reversible defect in mid and distal anterior segments c/w ischemia. Global LV function was normal. LV regional wall thickening was abnormal. EF-73%       Risk Assessment (Low, Medium, High): Medium    Echo (Date, Findings):     Antianginal Therapies:        Beta Blockers:  Metoprolol 25 mg po BID       Calcium Channel Blockers:        Long Acting Nitrates:        Ranexa:     Associated Risk Factors:        Cerebrovascular Disease: N/A       Chronic Lung Disease: N/A       Peripheral Arterial Disease: N/A       Chronic Kidney Disease (if yes, what is GFR): N/A       Uncontrolled Diabetes (if yes, what is HgbA1C or FBS): 15       Poorly Controlled Hypertension (if yes, what is SBP): N/A       Morbid Obesity (if yes, what is BMI): N/A       History of Recent Ventricular Arrhythmia: N/A       Inability to Ambulate Safely: N/A       Need for Therapeutic Anticoagulation: N/A       Antiplatelet or Contrast Allergy: N/A

## 2021-04-28 ENCOUNTER — TRANSCRIPTION ENCOUNTER (OUTPATIENT)
Age: 71
End: 2021-04-28

## 2021-04-28 ENCOUNTER — OUTPATIENT (OUTPATIENT)
Dept: OUTPATIENT SERVICES | Facility: HOSPITAL | Age: 71
LOS: 1 days | Discharge: HOME | End: 2021-04-28
Payer: COMMERCIAL

## 2021-04-28 VITALS
HEIGHT: 66 IN | DIASTOLIC BLOOD PRESSURE: 68 MMHG | HEART RATE: 64 BPM | OXYGEN SATURATION: 100 % | TEMPERATURE: 99 F | WEIGHT: 175.05 LBS | SYSTOLIC BLOOD PRESSURE: 136 MMHG | RESPIRATION RATE: 18 BRPM

## 2021-04-28 VITALS
SYSTOLIC BLOOD PRESSURE: 132 MMHG | RESPIRATION RATE: 18 BRPM | HEART RATE: 72 BPM | DIASTOLIC BLOOD PRESSURE: 64 MMHG | OXYGEN SATURATION: 99 %

## 2021-04-28 DIAGNOSIS — Z98.890 OTHER SPECIFIED POSTPROCEDURAL STATES: Chronic | ICD-10-CM

## 2021-04-28 DIAGNOSIS — E78.2 MIXED HYPERLIPIDEMIA: ICD-10-CM

## 2021-04-28 LAB
ANION GAP SERPL CALC-SCNC: 11 MMOL/L — SIGNIFICANT CHANGE UP (ref 5–17)
APTT BLD: 29.9 SEC — SIGNIFICANT CHANGE UP (ref 27.5–35.5)
BUN SERPL-MCNC: 24 MG/DL — HIGH (ref 8–20)
CALCIUM SERPL-MCNC: 9.2 MG/DL — SIGNIFICANT CHANGE UP (ref 8.6–10.2)
CHLORIDE SERPL-SCNC: 104 MMOL/L — SIGNIFICANT CHANGE UP (ref 98–107)
CO2 SERPL-SCNC: 25 MMOL/L — SIGNIFICANT CHANGE UP (ref 22–29)
CREAT SERPL-MCNC: 1.37 MG/DL — HIGH (ref 0.5–1.3)
GLUCOSE SERPL-MCNC: 176 MG/DL — HIGH (ref 70–99)
HCT VFR BLD CALC: 45.6 % — SIGNIFICANT CHANGE UP (ref 39–50)
HGB BLD-MCNC: 15.2 G/DL — SIGNIFICANT CHANGE UP (ref 13–17)
INR BLD: 0.93 RATIO — SIGNIFICANT CHANGE UP (ref 0.88–1.16)
MAGNESIUM SERPL-MCNC: 2.3 MG/DL — SIGNIFICANT CHANGE UP (ref 1.6–2.6)
MCHC RBC-ENTMCNC: 29 PG — SIGNIFICANT CHANGE UP (ref 27–34)
MCHC RBC-ENTMCNC: 33.3 GM/DL — SIGNIFICANT CHANGE UP (ref 32–36)
MCV RBC AUTO: 86.9 FL — SIGNIFICANT CHANGE UP (ref 80–100)
PLATELET # BLD AUTO: 171 K/UL — SIGNIFICANT CHANGE UP (ref 150–400)
POTASSIUM SERPL-MCNC: 4.6 MMOL/L — SIGNIFICANT CHANGE UP (ref 3.5–5.3)
POTASSIUM SERPL-SCNC: 4.6 MMOL/L — SIGNIFICANT CHANGE UP (ref 3.5–5.3)
PROTHROM AB SERPL-ACNC: 10.8 SEC — SIGNIFICANT CHANGE UP (ref 10.6–13.6)
RBC # BLD: 5.25 M/UL — SIGNIFICANT CHANGE UP (ref 4.2–5.8)
RBC # FLD: 11.9 % — SIGNIFICANT CHANGE UP (ref 10.3–14.5)
SODIUM SERPL-SCNC: 140 MMOL/L — SIGNIFICANT CHANGE UP (ref 135–145)
WBC # BLD: 4.51 K/UL — SIGNIFICANT CHANGE UP (ref 3.8–10.5)
WBC # FLD AUTO: 4.51 K/UL — SIGNIFICANT CHANGE UP (ref 3.8–10.5)

## 2021-04-28 PROCEDURE — 85610 PROTHROMBIN TIME: CPT

## 2021-04-28 PROCEDURE — C1769: CPT

## 2021-04-28 PROCEDURE — 83735 ASSAY OF MAGNESIUM: CPT

## 2021-04-28 PROCEDURE — C1760: CPT

## 2021-04-28 PROCEDURE — 93454 CORONARY ARTERY ANGIO S&I: CPT | Mod: 26

## 2021-04-28 PROCEDURE — 93454 CORONARY ARTERY ANGIO S&I: CPT

## 2021-04-28 PROCEDURE — C1887: CPT

## 2021-04-28 PROCEDURE — 93571 IV DOP VEL&/PRESS C FLO 1ST: CPT | Mod: 26,LD

## 2021-04-28 PROCEDURE — 99152 MOD SED SAME PHYS/QHP 5/>YRS: CPT

## 2021-04-28 PROCEDURE — 99153 MOD SED SAME PHYS/QHP EA: CPT

## 2021-04-28 PROCEDURE — 93005 ELECTROCARDIOGRAM TRACING: CPT

## 2021-04-28 PROCEDURE — 80048 BASIC METABOLIC PNL TOTAL CA: CPT

## 2021-04-28 PROCEDURE — C1894: CPT

## 2021-04-28 PROCEDURE — 85730 THROMBOPLASTIN TIME PARTIAL: CPT

## 2021-04-28 PROCEDURE — 93010 ELECTROCARDIOGRAM REPORT: CPT

## 2021-04-28 PROCEDURE — 36415 COLL VENOUS BLD VENIPUNCTURE: CPT

## 2021-04-28 PROCEDURE — 85027 COMPLETE CBC AUTOMATED: CPT

## 2021-04-28 PROCEDURE — 93571 IV DOP VEL&/PRESS C FLO 1ST: CPT | Mod: LD

## 2021-04-28 RX ORDER — ASPIRIN/CALCIUM CARB/MAGNESIUM 324 MG
1 TABLET ORAL
Qty: 0 | Refills: 0 | DISCHARGE

## 2021-04-28 RX ORDER — METFORMIN HYDROCHLORIDE 850 MG/1
1 TABLET ORAL
Qty: 60 | Refills: 0
Start: 2021-04-28 | End: 2021-05-27

## 2021-04-28 RX ORDER — SODIUM CHLORIDE 9 MG/ML
1000 INJECTION INTRAMUSCULAR; INTRAVENOUS; SUBCUTANEOUS
Refills: 0 | Status: DISCONTINUED | OUTPATIENT
Start: 2021-04-28 | End: 2021-05-12

## 2021-04-28 RX ORDER — CLOPIDOGREL BISULFATE 75 MG/1
75 TABLET, FILM COATED ORAL ONCE
Refills: 0 | Status: COMPLETED | OUTPATIENT
Start: 2021-04-28 | End: 2021-04-28

## 2021-04-28 RX ORDER — ASPIRIN/CALCIUM CARB/MAGNESIUM 324 MG
81 TABLET ORAL ONCE
Refills: 0 | Status: COMPLETED | OUTPATIENT
Start: 2021-04-28 | End: 2021-04-28

## 2021-04-28 RX ADMIN — Medication 81 MILLIGRAM(S): at 11:46

## 2021-04-28 RX ADMIN — SODIUM CHLORIDE 250 MILLILITER(S): 9 INJECTION INTRAMUSCULAR; INTRAVENOUS; SUBCUTANEOUS at 11:45

## 2021-04-28 RX ADMIN — SODIUM CHLORIDE 250 MILLILITER(S): 9 INJECTION INTRAMUSCULAR; INTRAVENOUS; SUBCUTANEOUS at 14:00

## 2021-04-28 RX ADMIN — CLOPIDOGREL BISULFATE 75 MILLIGRAM(S): 75 TABLET, FILM COATED ORAL at 11:46

## 2021-04-28 NOTE — DISCHARGE NOTE NURSING/CASE MANAGEMENT/SOCIAL WORK - PATIENT PORTAL LINK FT
You can access the FollowMyHealth Patient Portal offered by [facility] by registering at the following website: [email or web address]. By joining [employer]’s FollowMyHealth portal, you will also be able to view your health information using other applications (apps) compatible with our system.

## 2021-04-28 NOTE — DISCHARGE NOTE PROVIDER - NSDCMRMEDTOKEN_GEN_ALL_CORE_FT
aspirin 81 mg oral tablet: 1 tab(s) orally once a day  clopidogrel 75 mg oral tablet: 1 tab(s) orally once a day  Farxiga 5 mg oral tablet: 1 tab(s) orally once a day   fenofibrate 145 mg oral tablet: 1 tab(s) orally once a day  Lantus Solostar Pen 100 units/mL subcutaneous solution: 12 unit(s) subcutaneous once a day   Lipitor 40 mg oral tablet: 1 tab(s) orally once a day  metFORMIN 1000 mg oral tablet, extended release: 1 tab(s) orally 2 times a day  metoprolol tartrate 25 mg oral tablet: 1 tab(s) orally 2 times a day   aspirin 81 mg oral tablet: 1 tab(s) orally once a day  clopidogrel 75 mg oral tablet: 1 tab(s) orally once a day  Farxiga 5 mg oral tablet: 1 tab(s) orally once a day   fenofibrate 145 mg oral tablet: 1 tab(s) orally once a day  Lantus Solostar Pen 100 units/mL subcutaneous solution: 12 unit(s) subcutaneous once a day   Lipitor 40 mg oral tablet: 1 tab(s) orally once a day  metFORMIN 1000 mg oral tablet, extended release: 1 tab(s) orally 2 times a day-resume 5/1 am  metoprolol tartrate 25 mg oral tablet: 1 tab(s) orally 2 times a day

## 2021-04-28 NOTE — DISCHARGE NOTE PROVIDER - CARE PROVIDER_API CALL
Buck Villegas)  Cardiovascular Disease; Internal Medicine  [address] [place] [id number]  Phone: [phone]  Fax: [phone]  Follow Up Time:

## 2021-04-28 NOTE — DISCHARGE NOTE PROVIDER - NSDCCPCAREPLAN_GEN_ALL_CORE_FT
PRINCIPAL DISCHARGE DIAGNOSIS  Diagnosis: CAD (coronary artery disease)  Assessment and Plan of Treatment:       SECONDARY DISCHARGE DIAGNOSES  Diagnosis: HTN (hypertension)  Assessment and Plan of Treatment:     Diagnosis: HLD (hyperlipidemia)  Assessment and Plan of Treatment:     Diagnosis: DM (diabetes mellitus)  Assessment and Plan of Treatment:

## 2021-04-28 NOTE — PROGRESS NOTE ADULT - SUBJECTIVE AND OBJECTIVE BOX
Department of Cardiology                                                                  [facility]/[place] [address] [address] [facility]                                                            Telephone: [phone]. Fax:[phone]                                                    Post- Procedure Note: Left Heart Cardiac Catheterization       70 year old gentleman presenting to [facility] for a LHC with Dr Clifton Katz secondary to abnormal nuclear stress  test, left sided chest pain, SOB,  and PMH of CAD-requiring PCI to RCA ( on DAPT) and LAD in 2017. Pt also has a h/o Type 2 DM, HLD  Now s/p left heart catheterization via right groin approach with Dr. Katz : Patent previously placed RCA with mild LAD in stent stenosis-20%; IFR pLAD 0.91 nFFR normal. NO INTERVENTIONS. Medical management(see official report for full details).  Pt tolerated procedure well and presently has no c/o CP or SOB         PAST MEDICAL & SURGICAL HISTORY:  DM (diabetes mellitus)  HLD (hyperlipidemia)  CAD (coronary artery disease)  H/O cardiac catheterization  S/P Cholecystectomy      Objective:  Vital Signs Last 24 Hrs  T(C): 37.1 (28 Apr 2021 11:06), Max: 37.1 (28 Apr 2021 11:06)  T(F): 98.7 (28 Apr 2021 11:06), Max: 98.7 (28 Apr 2021 11:06)  HR: 64 (28 Apr 2021 11:06) (64 - 64)  BP: 136/68 (28 Apr 2021 11:06) (136/68 - 136/68)  BP(mean): --  RR: 18 (28 Apr 2021 11:06) (18 - 18)  SpO2: 100% (28 Apr 2021 11:06) (100% - 100%)    CM: SR  Neuro: A&OX3, CN 2-12 intact  HEENT: NC, AT  Lungs: CTA B/L  CV: S1, S2, no murmur, RRR  Abd: Soft  Right Groin: Soft, no bleeding, no hematoma  Extremity: + distal pulses                          15.2   4.51  )-----------( 171      ( 28 Apr 2021 11:00 )             45.6     04-28    140  |  104  |  24.0<H>  ----------------------------<  176<H>  4.6   |  25.0  |  1.37<H>    Ca    9.2      28 Apr 2021 11:00  Mg     2.3     04-28      PT/INR - ( 28 Apr 2021 11:00 )   PT: 10.8 sec;   INR: 0.93 ratio         PTT - ( 28 Apr 2021 11:00 )  PTT:29.9 sec      70 year old gentleman presenting to [facility] for a LHC with Dr Clifton Katz secondary to abnormal nuclear stress  test, left sided chest pain, SOB,  and PMH of CAD-requiring PCI to RCA ( on DAPT) and LAD in 2017. Pt also has a h/o Type 2 DM, HLD  Now s/p left heart catheterization via right groin approach with Dr. Kazt : Patent previously placed RCA with mild LAD in stent stenosis-20%; IFR pLAD 0.91 nFFR normal. NO INTERVENTIONS. Medical management    -post cardiac cath orders  -groin precautions  -bedrest x2 hours post procedure  -continue current medical therapy including dual anti platelet therapy with aspirin/ plavix   -statin therapy  -beta blocker  -gentle post procedure hydration  -follow up outpt in 2 weeks with Cardiologist-Dr Villegas  -Medication compliance stressed with pt  -Discharge later today                                                                            Department of Cardiology                                                                  [facility]/[place] [address] [address] [facility]                                                            Telephone: [phone]. Fax:[phone]                                                    Post- Procedure Note: Left Heart Cardiac Catheterization       70 year old gentleman presenting to [facility] for a LHC with Dr Clifton Katz secondary to abnormal nuclear stress  test, left sided chest pain, SOB,  and PMH of CAD-requiring PCI to RCA ( on DAPT) and LAD in 2017. Pt also has a h/o Type 2 DM, HLD  Now s/p left heart catheterization via right groin approach with Dr. Katz closed with 6Fr Angioseal : Patent previously placed RCA with mild LAD in stent stenosis-20%; IFR pLAD 0.91 nFFR normal. NO INTERVENTIONS. Medical management(see official report for full details).  Pt tolerated procedure well and presently has no c/o CP or SOB       Medications:  Versed 1mg  Fenatnyl 50 mcg  Heparin 5000 units    PAST MEDICAL & SURGICAL HISTORY:  DM (diabetes mellitus)  HLD (hyperlipidemia)  CAD (coronary artery disease)  H/O cardiac catheterization  S/P Cholecystectomy      Objective:  Vital Signs Last 24 Hrs  T(C): 37.1 (28 Apr 2021 11:06), Max: 37.1 (28 Apr 2021 11:06)  T(F): 98.7 (28 Apr 2021 11:06), Max: 98.7 (28 Apr 2021 11:06)  HR: 64 (28 Apr 2021 11:06) (64 - 64)  BP: 136/68 (28 Apr 2021 11:06) (136/68 - 136/68)  BP(mean): --  RR: 18 (28 Apr 2021 11:06) (18 - 18)  SpO2: 100% (28 Apr 2021 11:06) (100% - 100%)    CM: SR  Neuro: A&OX3, CN 2-12 intact  HEENT: NC, AT  Lungs: CTA B/L  CV: S1, S2, no murmur, RRR  Abd: Soft  Right Groin: Soft, no bleeding, no hematoma  Extremity: + distal pulses                          15.2   4.51  )-----------( 171      ( 28 Apr 2021 11:00 )             45.6     04-28    140  |  104  |  24.0<H>  ----------------------------<  176<H>  4.6   |  25.0  |  1.37<H>    Ca    9.2      28 Apr 2021 11:00  Mg     2.3     04-28      PT/INR - ( 28 Apr 2021 11:00 )   PT: 10.8 sec;   INR: 0.93 ratio         PTT - ( 28 Apr 2021 11:00 )  PTT:29.9 sec      70 year old gentleman presenting to [facility] for a LHC with Dr Clifton Katz secondary to abnormal nuclear stress  test, left sided chest pain, SOB,  and PMH of CAD-requiring PCI to RCA ( on DAPT) and LAD in 2017. Pt also has a h/o Type 2 DM, HLD  Now s/p left heart catheterization via right groin approach with Dr. Katz : Patent previously placed RCA with mild LAD in stent stenosis-20%; IFR pLAD 0.91 nFFR normal. NO INTERVENTIONS. Medical management    -post cardiac cath orders  -groin precautions  -bedrest x2 hours post procedure  -continue current medical therapy including dual anti platelet therapy with aspirin/ plavix   -statin therapy  -beta blocker  -gentle post procedure hydration  -follow up outpt in 2 weeks with Cardiologist-Dr Villegas  -Medication compliance stressed with pt  -Discharge later today

## 2021-05-10 ENCOUNTER — NON-APPOINTMENT (OUTPATIENT)
Age: 71
End: 2021-05-10

## 2021-05-12 RX ORDER — DAPAGLIFLOZIN 5 MG/1
5 TABLET, FILM COATED ORAL
Qty: 30 | Refills: 1 | Status: ACTIVE | COMMUNITY

## 2021-05-12 RX ORDER — INSULIN GLARGINE 100 [IU]/ML
100 INJECTION, SOLUTION SUBCUTANEOUS AT BEDTIME
Refills: 0 | Status: ACTIVE | COMMUNITY
Start: 2021-05-12

## 2021-05-13 ENCOUNTER — APPOINTMENT (OUTPATIENT)
Dept: CARDIOLOGY | Facility: CLINIC | Age: 71
End: 2021-05-13
Payer: COMMERCIAL

## 2021-05-13 VITALS
WEIGHT: 181 LBS | HEIGHT: 66 IN | DIASTOLIC BLOOD PRESSURE: 60 MMHG | OXYGEN SATURATION: 98 % | BODY MASS INDEX: 29.09 KG/M2 | TEMPERATURE: 97.3 F | HEART RATE: 73 BPM | SYSTOLIC BLOOD PRESSURE: 112 MMHG

## 2021-05-13 DIAGNOSIS — E11.9 TYPE 2 DIABETES MELLITUS W/OUT COMPLICATIONS: ICD-10-CM

## 2021-05-13 DIAGNOSIS — I25.10 ATHEROSCLEROTIC HEART DISEASE OF NATIVE CORONARY ARTERY W/OUT ANGINA PECTORIS: ICD-10-CM

## 2021-05-13 DIAGNOSIS — E78.2 MIXED HYPERLIPIDEMIA: ICD-10-CM

## 2021-05-13 PROCEDURE — 99214 OFFICE O/P EST MOD 30 MIN: CPT | Mod: 25

## 2021-05-13 PROCEDURE — 93000 ELECTROCARDIOGRAM COMPLETE: CPT

## 2021-05-13 PROCEDURE — 99072 ADDL SUPL MATRL&STAF TM PHE: CPT

## 2021-07-09 NOTE — ED PROVIDER NOTE - NS_BEDUNITTYPES_ED_ALL_ED

## 2021-12-29 ENCOUNTER — EMERGENCY (EMERGENCY)
Facility: HOSPITAL | Age: 71
LOS: 1 days | Discharge: HOME | End: 2021-12-29
Attending: EMERGENCY MEDICINE
Payer: COMMERCIAL

## 2021-12-29 VITALS
HEIGHT: 66 IN | OXYGEN SATURATION: 97 % | SYSTOLIC BLOOD PRESSURE: 144 MMHG | DIASTOLIC BLOOD PRESSURE: 88 MMHG | RESPIRATION RATE: 18 BRPM | TEMPERATURE: 98 F | HEART RATE: 84 BPM

## 2021-12-29 DIAGNOSIS — Z98.890 OTHER SPECIFIED POSTPROCEDURAL STATES: Chronic | ICD-10-CM

## 2021-12-29 PROCEDURE — 73030 X-RAY EXAM OF SHOULDER: CPT | Mod: 26,LT

## 2021-12-29 PROCEDURE — 99284 EMERGENCY DEPT VISIT MOD MDM: CPT | Mod: 25

## 2021-12-29 PROCEDURE — 71046 X-RAY EXAM CHEST 2 VIEWS: CPT | Mod: 26

## 2021-12-29 PROCEDURE — 99284 EMERGENCY DEPT VISIT MOD MDM: CPT

## 2021-12-29 PROCEDURE — 73030 X-RAY EXAM OF SHOULDER: CPT

## 2021-12-29 PROCEDURE — 71046 X-RAY EXAM CHEST 2 VIEWS: CPT

## 2021-12-29 RX ORDER — METHOCARBAMOL 500 MG/1
500 TABLET, FILM COATED ORAL ONCE
Refills: 0 | Status: COMPLETED | OUTPATIENT
Start: 2021-12-29 | End: 2021-12-29

## 2021-12-29 RX ORDER — ACETAMINOPHEN 500 MG
650 TABLET ORAL ONCE
Refills: 0 | Status: COMPLETED | OUTPATIENT
Start: 2021-12-29 | End: 2021-12-29

## 2021-12-29 RX ADMIN — Medication 650 MILLIGRAM(S): at 20:39

## 2021-12-29 NOTE — ED STATDOCS - CLINICAL SUMMARY MEDICAL DECISION MAKING FREE TEXT BOX
Patient with mechanical fall while leaving work fell on left side with left shoulder pain and chest wall pain. Likely muscle strain. Full range of motion and lungs clear. X-ray shoulder and chest, Tylenol for pain.

## 2021-12-29 NOTE — ED STATDOCS - PHYSICAL EXAMINATION
VITAL SIGNS: I have reviewed nursing notes and confirm.  CONSTITUTIONAL:  in no acute distress.  SKIN: Skin exam is warm and dry, no acute rash.  HEAD: Normocephalic; atraumatic.  EYES: PERRL, EOM intact; conjunctiva and sclera clear.  ENT: No nasal discharge; airway clear. Throat clear.  NECK: Supple; non tender.    CARD: Regular rate and rhythm.  RESP: No wheezes,  no rales or rhonchi.   ABD:  soft; non-distended; non-tender;   EXT: Normal ROM. No clubbing, cyanosis or edema. mild TTP to left shoulder, left interior fourth rib TTP   NEURO: Alert, oriented. Grossly unremarkable. No focal deficits.  moves all extremities,  normal gait   PSYCH: Cooperative, appropriate.

## 2021-12-29 NOTE — ED STATDOCS - PROGRESS NOTE DETAILS
RN unable to locate patient anywhere. patient ELOPED. Patient now back. xray showed no acute fracture. declined pain meds. wants to be discharged.

## 2021-12-29 NOTE — ED STATDOCS - OBJECTIVE STATEMENT
70 y/o male with PMHx of HLD, DM and CAD presents to ED s/p trip and fall onto left side of his body while leaving work. Patient reports left shoulder pain, left chest wall pain and rib pain.   Denies: hitting head, dizziness

## 2021-12-29 NOTE — ED ADULT NURSE REASSESSMENT NOTE - NS ED NURSE REASSESS COMMENT FT1
Pt DC'd, pt ambulated out of the ED. Pt in no distress, DC instructions provided to pt, all questions answered, all needs met.

## 2021-12-29 NOTE — ED STATDOCS - PATIENT PORTAL LINK FT
You can access the FollowMyHealth Patient Portal offered by [facility] by registering at the following website: [email or web address]. By joining [facility]’s FollowMyHealth portal, you will also be able to view your health information using other applications (apps) compatible with our system.

## 2021-12-29 NOTE — ED ADULT TRIAGE NOTE - CHIEF COMPLAINT QUOTE
patient arrived ambulatory to ED, awake, alert, and oriented times 3, breathing unlabored.  Patient had recent slip and fall this morning, now has complaints of pain to left chest, left shoulder.  Patient fell onto chest and shoulder.  Patient did not hit head.

## 2021-12-29 NOTE — ED ADULT NURSE REASSESSMENT NOTE - NS ED NURSE REASSESS COMMENT FT1
Report received from previous RN, per that RN, pt has not been in his room for "a long time." this RN went to check his room and pt noted to not be there.  Pt now at 2030 returned to his room, MD aware. Pt states he wants to be discharged. MD made aware. Pt in no distress.

## 2021-12-29 NOTE — ED STATDOCS - NS ED ROS FT
Review of Systems  •	CONSTITUTIONAL - no  fever, no diaphoresis, no weight change  •	SKIN - no rash  •	HEMATOLOGIC - no bleeding, no bruising  •	EYES - no eye pain, no blurred vision  •	ENT - no change in hearing, no pain  •	RESPIRATORY - no shortness of breath, no cough  •	CARDIAC - no chest pain, no palpitations  •	GI - no abd pain, no nausea, no vomiting, no diarrhea, no constipation, no bleeding  •	GENITO-URINARY - no discharge, no dysuria; no hematuria,   •	ENDO - no polydipsia, no polyuria, no heat/no cold intolerance  •	MUSCULOSKELETAL - (+) shoulder pain, no swelling, no redness  •	NEUROLOGIC - no weakness, no headache, no anesthesia, no paresthesias  •	PSYCH - no anxiety, non suicidal, non homicidal, no hallucination, no depression

## 2022-01-01 NOTE — ED ADULT NURSE REASSESSMENT NOTE - NS ED NURSE REASSESS COMMENT FT1
Pt. resting in bed with family at bedside. Pt. denies any pain at this time or symptoms, pt. states symptoms only arise when looking up. Called MRI and pt. is up next. .

## 2022-01-24 NOTE — PROGRESS NOTE ADULT - NSHPATTENDINGPLANDISCUSS_GEN_ALL_CORE
the patient.  All imaging and results of lab/other studies reviewed by me. All questions answered to their satisfaction. At this time they agree with the current plan of therapy.
negative...

## 2022-02-20 ENCOUNTER — TRANSCRIPTION ENCOUNTER (OUTPATIENT)
Age: 72
End: 2022-02-20

## 2022-04-04 NOTE — ED ADULT TRIAGE NOTE - PRO INTERPRETER NEED 2
"Subjective:   Patient ID:  John Rothman English

## 2022-07-22 ENCOUNTER — APPOINTMENT (OUTPATIENT)
Dept: ORTHOPEDIC SURGERY | Facility: CLINIC | Age: 72
End: 2022-07-22

## 2022-07-22 VITALS — BODY MASS INDEX: 29.09 KG/M2 | HEIGHT: 66 IN | WEIGHT: 181 LBS

## 2022-07-22 PROCEDURE — 99203 OFFICE O/P NEW LOW 30 MIN: CPT | Mod: 25

## 2022-07-22 PROCEDURE — 20550 NJX 1 TENDON SHEATH/LIGAMENT: CPT

## 2022-07-22 NOTE — HISTORY OF PRESENT ILLNESS
[5] : 5 [0] : 0 [Dull/Aching] : dull/aching [de-identified] : 72 year old male with multiple week history of RIGHT littl efinger pain and locking.  No truama.  Worse in the morning.  Also has masses in bilateral palms [] : no [FreeTextEntry1] : RT pinky

## 2022-07-22 NOTE — PROCEDURE
[Tendon Sheath] : tendon sheath [Right] : of the right [5th] : 5th trigger finger [Pain] : pain [Inflammation] : inflammation [Alcohol] : alcohol [___ cc    1%] : Lidocaine ~Vcc of 1%  [___ cc    10mg] : Triamcinolone (Kenalog) ~Vcc of 10 mg

## 2022-07-22 NOTE — DISCUSSION/SUMMARY
[Medication Risks Reviewed] : Medication risks reviewed [de-identified] : Patient wished to receive injection

## 2022-07-22 NOTE — PHYSICAL EXAM
[Bilateral] : hand bilaterally [FreeTextEntry3] : RIGHT pretendinous cord, LET pretendinous cord to little finger\par A1 pulley tenderness to little finger LET hand.\par LET little triggering

## 2022-08-19 ENCOUNTER — APPOINTMENT (OUTPATIENT)
Dept: ORTHOPEDIC SURGERY | Facility: CLINIC | Age: 72
End: 2022-08-19

## 2022-08-19 VITALS — HEIGHT: 66 IN | BODY MASS INDEX: 29.09 KG/M2 | WEIGHT: 181 LBS

## 2022-08-19 DIAGNOSIS — M65.351 TRIGGER FINGER, RIGHT LITTLE FINGER: ICD-10-CM

## 2022-08-19 PROCEDURE — 99213 OFFICE O/P EST LOW 20 MIN: CPT

## 2022-08-19 NOTE — PHYSICAL EXAM
[Bilateral] : hand bilaterally [FreeTextEntry3] : RIGHT pretendinous cord, LET pretendinous cord to little finger [de-identified] : No triggering

## 2022-08-19 NOTE — HISTORY OF PRESENT ILLNESS
[Gradual] : gradual [5] : 5 [0] : 0 [Dull/Aching] : dull/aching [Intermittent] : intermittent [Nothing helps with pain getting better] : Nothing helps with pain getting better [de-identified] : 72 year old male followed for RIGHT little trigger finger.  He is 4 weeks s/p injection.   [] : no [FreeTextEntry1] : RT pinky

## 2022-10-20 ENCOUNTER — NON-APPOINTMENT (OUTPATIENT)
Age: 72
End: 2022-10-20

## 2022-10-21 ENCOUNTER — EMERGENCY (EMERGENCY)
Facility: HOSPITAL | Age: 72
LOS: 1 days | Discharge: HOME | End: 2022-10-21
Attending: EMERGENCY MEDICINE
Payer: COMMERCIAL

## 2022-10-21 VITALS
HEART RATE: 66 BPM | DIASTOLIC BLOOD PRESSURE: 81 MMHG | RESPIRATION RATE: 20 BRPM | OXYGEN SATURATION: 97 % | SYSTOLIC BLOOD PRESSURE: 130 MMHG | HEIGHT: 66 IN | TEMPERATURE: 98 F | WEIGHT: 175.93 LBS

## 2022-10-21 VITALS
TEMPERATURE: 97 F | SYSTOLIC BLOOD PRESSURE: 119 MMHG | HEART RATE: 59 BPM | DIASTOLIC BLOOD PRESSURE: 74 MMHG | RESPIRATION RATE: 20 BRPM | OXYGEN SATURATION: 96 %

## 2022-10-21 DIAGNOSIS — Z98.890 OTHER SPECIFIED POSTPROCEDURAL STATES: Chronic | ICD-10-CM

## 2022-10-21 LAB
RAPID RVP RESULT: DETECTED
RV+EV RNA SPEC QL NAA+PROBE: DETECTED
SARS-COV-2 RNA SPEC QL NAA+PROBE: SIGNIFICANT CHANGE UP

## 2022-10-21 PROCEDURE — 71045 X-RAY EXAM CHEST 1 VIEW: CPT | Mod: 26

## 2022-10-21 PROCEDURE — 94640 AIRWAY INHALATION TREATMENT: CPT

## 2022-10-21 PROCEDURE — 99283 EMERGENCY DEPT VISIT LOW MDM: CPT | Mod: 25

## 2022-10-21 PROCEDURE — 99284 EMERGENCY DEPT VISIT MOD MDM: CPT

## 2022-10-21 PROCEDURE — 0225U NFCT DS DNA&RNA 21 SARSCOV2: CPT

## 2022-10-21 PROCEDURE — 71045 X-RAY EXAM CHEST 1 VIEW: CPT

## 2022-10-21 RX ORDER — ALBUTEROL 90 UG/1
2 AEROSOL, METERED ORAL ONCE
Refills: 0 | Status: COMPLETED | OUTPATIENT
Start: 2022-10-21 | End: 2022-10-21

## 2022-10-21 RX ADMIN — Medication 40 MILLIGRAM(S): at 16:03

## 2022-10-21 RX ADMIN — Medication 100 MILLIGRAM(S): at 16:03

## 2022-10-21 RX ADMIN — ALBUTEROL 2 PUFF(S): 90 AEROSOL, METERED ORAL at 16:03

## 2022-10-21 NOTE — ED PROVIDER NOTE - PATIENT PORTAL LINK FT
You can access the FollowMyHealth Patient Portal offered by [facility] by registering at the following website: [email or web address]. By joining [facility]’s FollowMyHealth portal, you will also be able to view your health information using other applications (apps) compatible with our system.

## 2022-10-21 NOTE — ED ADULT TRIAGE NOTE - CHIEF COMPLAINT QUOTE
" I was told by urgent care to come to ED for fluid in the lungs and right ear pain" pt co cold symtpoms for 2 days and today started to have right ear pain and went to urgent care

## 2022-10-21 NOTE — ED PROVIDER NOTE - OBJECTIVE STATEMENT
73 yo male history of HTN, DM presents with cough w mucous production over the past several days. States that he has been experiencing productive cough over the past several days. States that he has been experiencing rhinorrhea and right ear pain as well. Patient initially presented to urgent care who referred him to the ED. Patient denies fever/chills, sore throat, sob, chest pain, palpitations, abd pain, n/v/d, urinary complaints, neurologic complaints.

## 2022-10-21 NOTE — ED PROVIDER NOTE - ATTENDING CONTRIBUTION TO CARE
I personally saw the patient with the resident, and completed the key components of the history and physical exam. I then discussed the management plan with the resident.    71 y/o M with PMH DM, HTN presents for cough, rhinorrhea and right ear pain x 3 days. He notes that his two co-workers at work are both out with rhino/enterovirus. He denies chest pain, fevers, SOB, abdominal pain, nausea/vomiting/diarrhea, LE edema.    PE - NAD, well appearing, right TM clear with good light reflex, RRR, diffuse crackles, no wheezes, abd soft NT/ND, no LE edema.    CXR clear, will give albuterol pump, prednisone, tessalon and reassess. RVP sent - likely viral due to + sick contacts.

## 2022-10-21 NOTE — ED PROVIDER NOTE - NSFOLLOWUPINSTRUCTIONS_ED_ALL_ED_FT
Return to the D should your symptoms worsen. Followup with your primary physician.      Viral Respiratory Infection      A viral respiratory infection is an illness that affects parts of the body that are used for breathing. These include the lungs, nose, and throat. It is caused by a germ called a virus.    Some examples of this kind of infection are:  •A cold.      •The flu (influenza).      •A respiratory syncytial virus (RSV) infection.        What are the causes?    This condition is caused by a virus. It spreads from person to person. You can get the virus if:  •You breathe in droplets from someone who is sick.      •You come in contact with people who are sick.      •You touch mucus or other fluid from a person who is sick.        What are the signs or symptoms?    Symptoms of this condition include:  •A stuffy or runny nose.      •A sore throat.      •A cough.      •Shortness of breath.      •Trouble breathing.      •Yellow or green fluid in the nose.      Other symptoms may include:  •A fever.      •Sweating or chills.      •Tiredness (fatigue).      •Achy muscles.      •A headache.        How is this treated?    This condition may be treated with:  •Medicines that treat viruses.      •Medicines that make it easy to breathe.      •Medicines that are sprayed into the nose.      •Acetaminophen or NSAIDs, such as ibuprofen, to treat fever.        Follow these instructions at home:    Managing pain and congestion     •Take over-the-counter and prescription medicines only as told by your doctor.    •If you have a sore throat, gargle with salt water. Do this 3–4 times a day or as needed.  •To make salt water, dissolve ½–1 tsp (3–6 g) of salt in 1 cup (237 mL) of warm water. Make sure that all the salt dissolves.        •Use nose drops made from salt water. This helps with stuffiness (congestion). It also helps soften the skin around your nose.    •Take 2 tsp (10 mL) of honey at bedtime to lessen coughing at night.  •Do not give honey to children who are younger than 1 year old.        •Drink enough fluid to keep your pee (urine) pale yellow.        General instructions   A sign telling the reader not to smoke.   •Rest as much as possible.      • Do not drink alcohol.      • Do not smoke or use any products that contain nicotine or tobacco. If you need help quitting, ask your doctor.      •Keep all follow-up visits.        How is this prevented?       Washing hands with soap and water.       A person covering her mouth and nose with a cloth while sneezing.     •Get a flu shot every year. Ask your doctor when you should get your flu shot.    • Do not let other people get your germs. If you are sick:  •Wash your hands with soap and water often. Wash your hands after you cough or sneeze. Wash hands for at least 20 seconds. If you cannot use soap and water, use hand .      •Cover your mouth when you cough. Cover your nose and mouth when you sneeze.      •Do not share cups or eating utensils.      •Clean commonly used objects often. Clean commonly touched surfaces.      •Stay home from work or school.        •Avoid contact with people who are sick during cold and flu season. This is in fall and winter.        Get help if:    •Your symptoms last for 10 days or longer.      •Your symptoms get worse over time.      •You have very bad pain in your face or forehead.      •Parts of your jaw or neck get very swollen.      •You have shortness of breath.        Get help right away if:    •You feel pain or pressure in your chest.      •You have trouble breathing.      •You faint or feel like you will faint.      •You keep vomiting and it gets worse.      •You feel confused.      These symptoms may be an emergency. Get help right away. Call your local emergency services (911 in the [place]   • Do not wait to see if the symptoms will go away.        •  Do not drive yourself to the hospital.         Summary    •A viral respiratory infection is an illness that affects parts of the body that are used for breathing.      •Examples of this illness include a cold, the flu, and a respiratory syncytial virus (RSV) infection.      •The infection can cause a runny nose, cough, sore throat, and fever.      •Follow what your doctor tells you about taking medicines, drinking lots of fluid, washing your hands, resting at home, and avoiding people who are sick.      This information is not intended to replace advice given to you by your health care provider. Make sure you discuss any questions you have with your health care provider.

## 2022-10-21 NOTE — ED PROVIDER NOTE - CLINICAL SUMMARY MEDICAL DECISION MAKING FREE TEXT BOX
73 yo male w viral symptoms for past several days. Patient denies cp, sob. VSS. Check CXR, EKG, RVP. Monitor and reassess.

## 2022-11-01 NOTE — ED PROVIDER NOTE - NS ED MD DISPO DISCHARGE CCDA
Provider at bedside       Leandra Cuadra RN  11/01/22 2461 Patient/Caregiver provided printed discharge information.

## 2022-12-15 NOTE — DISCHARGE NOTE PROVIDER - HOSPITAL COURSE
70 year old gentleman presenting to [facility] for a C with Dr Clifton Katz secondary to abnormal nuclear stress  test, left sided chest pain, SOB,  and PMH of CAD-requiring PCI to RCA ( on DAPT) and LAD in 2017. Pt also has a h/o Type 2 DM, HLD.  S/P left heart catheterization on 4/28/21 by Dr Katz via Otc Regimen: CeraVe SA: apply thin layer to scalp at night X 3 weeks Detail Level: Zone Plan: Follow up in 3 weeks for further E/M  70 year old gentleman presenting to [facility] area for a [facility] with Dr Clifton Katz secondary to abnormal nuclear stress  test, left sided chest pain, SOB,  and PMH of CAD-requiring PCI to RCA ( on DAPT) and LAD in 2017. Pt also has a h/o Type 2 DM, HLD.  S/P left heart catheterization on 4/28/21 by Dr Katz via R femoral approach. Patent previously placed RCA with mild LAD in stent stenosis-20%; IFR pLAD 0.91 nFFR normal. NO INTERVENTIONS. Medical management and medication compliance stressed  Pt ambulatory, tolerating diet and voiding freely. He is stable for discharge

## 2023-01-12 NOTE — ED ADULT NURSE NOTE - NS ED NURSE LEVEL OF CONSCIOUSNESS ORIENTATION
Department of Anesthesiology  Preprocedure Note       Name:  Zoila Galaviz   Age:  81 y.o.  :  1929                                          MRN:  [id number]         Date:  2023      Surgeon: Kostas Guerra):  Nicholas Weaver MD    Procedure: Procedure(s):  LEFT HIP Cesar Proffer NAILING    Medications prior to admission:   Prior to Admission medications    Medication Sig Start Date End Date Taking? Authorizing Provider   QUEtiapine (SEROQUEL) 25 MG tablet Take 25 mg by mouth every evening 22  Yes Historical Provider, MD   gabapentin (NEURONTIN) 100 MG capsule Take 500 mg by mouth every evening. 22 Yes Historical Provider, MD   eszopiclone (LUNESTA) 3 MG TABS Take 3 mg by mouth nightly as needed (insomnia). For 10 days (23 - 1/15/23) 1/5/23 1/15/23 Yes Historical Provider, MD   omeprazole (PRILOSEC) 20 MG delayed release capsule Take 20 mg by mouth Daily   Yes Historical Provider, MD   Fesoterodine Fumarate ER 8 MG TB24 Take 1 tablet by mouth daily 22  Yes Historical Provider, MD   levothyroxine (SYNTHROID) 112 MCG tablet Take 112 mcg by mouth Daily   Yes Historical Provider, MD   lubiprostone (AMITIZA) 8 MCG CAPS capsule Take 8 mcg by mouth 2 times daily (with meals)   Yes Historical Provider, MD   LORazepam (ATIVAN) 1 MG tablet Take 1 mg by mouth nightly.  23   Historical Provider, MD   Vibegron (GEMTESA) 75 MG TABS Take 1 tablet by mouth daily 10/18/22   Historical Provider, MD   denosumab (PROLIA) 60 MG/ML SOLN SC injection Inject 60 mg into the skin every 6 months 22   Historical Provider, MD   simvastatin (ZOCOR) 20 MG tablet Take 20 mg by mouth nightly    Historical Provider, MD       Current medications:    Current Facility-Administered Medications   Medication Dose Route Frequency Provider Last Rate Last Admin    perflutren lipid microspheres (DEFINITY) injection 1.5 mL  1.5 mL IntraVENous ONCE PRN Del Villalpando MD        sodium chloride flush 0.9 % injection 5-40 mL  5-40 mL IntraVENous 2 times per day Carmen Tay MD        sodium chloride flush 0.9 % injection 5-40 mL  5-40 mL IntraVENous PRN Carmen Tay MD        0.9 % sodium chloride infusion   IntraVENous PRN Carmen Tay MD        ondansetron (ZOFRAN-ODT) disintegrating tablet 4 mg  4 mg Oral Q8H PRN Carmen Tay MD        Or    [facility] injection 4 mg  4 mg IntraVENous Q8H PRN Carmen Tay MD        polyethylene glycol (GLYCOLAX) packet 17 g  17 g Oral Daily PRN Carmen Tay MD        acetaminophen (TYLENOL) tablet 650 mg  650 mg Oral Q6H PRN Carmen Tay MD        Or   Romel Oneill acetaminophen (TYLENOL) suppository 650 mg  650 mg Rectal Q6H PRN Carmen Tay MD        heparin (porcine) injection 5,000 Units  5,000 Units SubCUTAneous 3 times per day Carmen Tay MD   5,000 Units at 01/12/23 1614    [START ON 1/13/2023] levothyroxine (SYNTHROID) tablet 112 mcg  112 mcg Oral QAM AC Carmen Tay MD        atorvastatin (LIPITOR) tablet 10 mg  10 mg Oral Nightly Carmen Tay MD        [START ON 1/13/2023] pantoprazole (PROTONIX) tablet 40 mg  40 mg Oral QAM AC Carmen Tay MD        clonazePAM [facility]) tablet 1 mg  1 mg Oral Nightly PRN Carmen Tay MD        lactated ringers infusion   IntraVENous Continuous Carmen Tay MD 75 mL/hr at 01/12/23 1315 1,000 mL at 01/12/23 1315    oxyCODONE (ROXICODONE) immediate release tablet 5 mg  5 mg Oral Q6H PRN Carmen Tay MD        Or   Romel Oneill oxyCODONE (ROXICODONE) immediate release tablet 10 mg  10 mg Oral Q6H PRN Carmen Tay MD        [facility]) chewable tablet 80 mg  80 mg Oral Q6H PRN Carmen Tay MD        HYDROmorphone (DILAUDID) injection 0.5 mg  0.5 mg IntraVENous Q4H PRN Carmen Tay MD   0.5 mg at 01/12/23 1629    magnesium sulfate 2000 mg in 50 mL IVPB premix  2,000 mg IntraVENous Once Carmen Tay MD 25 mL/hr at 01/12/23 1613 2,000 mg at 01/12/23 1613    midazolam (VERSED) injection 0.5 mg  0.5 mg IntraVENous Once Carmen Tay, MD         Facility-Administered Medications Ordered in Other Encounters   Medication Dose Route Frequency Provider Last Rate Last Admin    esmolol (BREVIBLOC) injection   IntraVENous PRN Leandra Burrell, DO   20 mg at 23 1728    fentaNYL (SUBLIMAZE) injection   IntraVENous PRN Leandra Burrell, DO   100 mcg at 23 1728    propofol injection   IntraVENous PRN Leandra Burrell, DO   50 mg at 23 1721       Allergies:  No Known Allergies    Problem List:    Patient Active Problem List   Diagnosis Code    Actinic keratosis L57.0    Basal cell carcinoma of right mid back C44.519    Visit for suture removal Z48.02    Lumbar radiculopathy M54.16    Compression fracture of T12 vertebra with routine healing S22.080D    Osteoarthritis of spine with radiculopathy, lumbar region M47.26    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 D04.39    Visit for wound check Z51.89    Vitamin D deficiency E55.9    Osteoporosis, postmenopausal M81.0    Vertebral fracture, osteoporotic, with routine healing, subsequent encounter M80.08XD    Other fracture of left femur, initial encounter for closed fracture ([facility] [address] D84.8N9F       Past Medical History:        Diagnosis Date    Cancer ([facility] [address]     Hepatitis     Hyperlipidemia     Osteoarthritis        Past Surgical History:        Procedure Laterality Date    BREAST LUMPECTOMY      Right     SECTION      MASTECTOMY      Left       Social History:    Social History     Tobacco Use    Smoking status: Former    Smokeless tobacco: Never   Substance Use Topics    Alcohol use:  Yes     Alcohol/week: 1.0 standard drink     Types: 1 Standard drinks or equivalent per week     Comment: rarely                                Counseling given: Not Answered      Vital Signs (Current):   Vitals:    23 1459 23 1526 23 1556 23 1642   BP: (!) 135/121 (!) 175/76 (!) 167/78 (!) 163/65   Pulse: 87 78 80 86 Resp:    13   Temp:       SpO2: (!) 83% 98% 98% 94%   Weight:       Height:                                                  BP Readings from Last 3 Encounters:   01/12/23 (!) 163/65   09/11/22 (!) 148/88   08/13/21 135/73       NPO Status:                                                                                 BMI:   Wt Readings from Last 3 Encounters:   01/12/23 147 lb 0.8 oz (66.7 kg)   08/29/19 136 lb 12.8 oz (62.1 kg)   07/05/18 140 lb (63.5 kg)     Body mass index is 27.78 kg/m². CBC:   Lab Results   Component Value Date/Time    WBC 6.5 01/12/2023 09:12 AM    RBC 4.85 01/12/2023 09:12 AM    HGB 15.0 01/12/2023 09:12 AM    HCT 44.3 01/12/2023 09:12 AM    MCV 91.3 01/12/2023 09:12 AM    RDW 14.4 01/12/2023 09:12 AM     01/12/2023 09:12 AM       CMP:   Lab Results   Component Value Date/Time     01/12/2023 02:49 PM    K 4.1 01/12/2023 02:49 PM    K 5.2 01/12/2023 09:12 AM     01/12/2023 02:49 PM    CO2 22 01/12/2023 02:49 PM    BUN 10 01/12/2023 02:49 PM    CREATININE 0.8 01/12/2023 02:49 PM    AGRATIO 1.8 01/12/2023 02:49 PM    LABGLOM >60 01/12/2023 02:49 PM    GLUCOSE 105 01/12/2023 02:49 PM    PROT 6.6 01/12/2023 02:49 PM    CALCIUM 9.7 01/12/2023 02:49 PM    BILITOT 1.0 01/12/2023 02:49 PM    ALKPHOS 55 01/12/2023 02:49 PM    AST 19 01/12/2023 02:49 PM    ALT 11 01/12/2023 02:49 PM       POC Tests: No results for input(s): POCGLU, POCNA, POCK, POCCL, POCBUN, POCHEMO, POCHCT in the last 72 hours.     Coags:   Lab Results   Component Value Date/Time    PROTIME 14.2 01/12/2023 09:12 AM    INR 1.10 01/12/2023 09:12 AM    APTT 38.2 01/12/2023 09:12 AM       HCG (If Applicable): No results found for: PREGTESTUR, PREGSERUM, HCG, HCGQUANT     ABGs: No results found for: PHART, PO2ART, FGR3ACO, SAR9JPD, BEART, J2CLWSLO     Type & Screen (If Applicable):  No results found for: LABABO, LABRH    Drug/Infectious Status (If Applicable):  No results found for: HIV, HEPCAB    COVID-19 Oriented - self; Oriented - place; Oriented - time Screening (If Applicable): No results found for: COVID19        Anesthesia Evaluation  Patient summary reviewed and Nursing notes reviewed no history of anesthetic complications:   Airway: Mallampati: II  TM distance: >3 FB     Mouth opening: > = 3 FB   Dental:    (+) caps      Pulmonary: breath sounds clear to auscultation      (-) not a current smoker                           Cardiovascular:  Exercise tolerance: good (>4 METS),   (+) hyperlipidemia        Rhythm: regular  Rate: normal                 ROS comment: 1/12/23   Left ventricular cavity size is normal. Normal left ventricular wall thickness. Overall left ventricular systolic function appears normal with an ejection fraction of 60-65%. No regional wall motion abnormalities are   noted. Diastolic filling parameters suggest grade II diastolic dysfunction. Neuro/Psych:               GI/Hepatic/Renal:   (+) GERD:,           Endo/Other:    (+) hypothyroidism: arthritis: OA., malignancy/cancer. Abdominal:             Vascular: Other Findings:           Anesthesia Plan      general and regional     ASA 3       Induction: intravenous. MIPS: Prophylactic antiemetics administered. Anesthetic plan and risks discussed with patient. Plan discussed with CRNA.                     Vicky Mackay DO   1/12/2023 75

## 2023-02-09 ENCOUNTER — APPOINTMENT (OUTPATIENT)
Dept: ORTHOPEDIC SURGERY | Facility: CLINIC | Age: 73
End: 2023-02-09
Payer: COMMERCIAL

## 2023-02-09 VITALS — BODY MASS INDEX: 27.32 KG/M2 | HEIGHT: 66 IN | WEIGHT: 170 LBS

## 2023-02-09 DIAGNOSIS — E11.9 TYPE 2 DIABETES MELLITUS W/OUT COMPLICATIONS: ICD-10-CM

## 2023-02-09 DIAGNOSIS — M75.81 OTHER SHOULDER LESIONS, RIGHT SHOULDER: ICD-10-CM

## 2023-02-09 PROCEDURE — 73030 X-RAY EXAM OF SHOULDER: CPT | Mod: RT

## 2023-02-09 PROCEDURE — 99213 OFFICE O/P EST LOW 20 MIN: CPT

## 2023-02-09 NOTE — HISTORY OF PRESENT ILLNESS
[Dull/Aching] : dull/aching [Rest] : rest [5] : 5 [3] : 3 [Full time] : Work status: full time [de-identified] : 72 year old male with RIGHT shoulder stiffness and pain for about 2-3 months. He fell 1.5 months ago.  [] : no [FreeTextEntry1] : rt shoulder/ arm [FreeTextEntry3] : ~3 months [FreeTextEntry5] : no specific injury, pt p/w right shoulder pain and intermittent pain that travels down the arm.  [de-identified] : certain movements

## 2023-02-09 NOTE — PHYSICAL EXAM
[Sitting] : sitting [Right] : right shoulder [There are no fractures, subluxations or dislocations. No significant abnormalities are seen] : There are no fractures, subluxations or dislocations. No significant abnormalities are seen [] : negative Speed's [FreeTextEntry9] : internal rotation to T12  [de-identified] : positive crossover sign  [TWNoteComboBox7] : active forward flexion 170 degrees [de-identified] : active abduction 170 degrees [de-identified] : external rotation 50 degrees

## 2023-02-09 NOTE — DISCUSSION/SUMMARY
[de-identified] : Discussed the nature of the diagnosis and risk and benefits of different modalities of treatment.\par Statrt PT.\par NSAIDs\par RTO 3 weeks.

## 2023-03-30 ENCOUNTER — APPOINTMENT (OUTPATIENT)
Dept: ORTHOPEDIC SURGERY | Facility: CLINIC | Age: 73
End: 2023-03-30

## 2023-07-03 NOTE — ED PROVIDER NOTE - PHYSICAL EXAMINATION
Gen: Alert, NAD  Head: NC, AT, PERRL, EOMI, normal lids/conjunctiva  ENT: normal hearing, patent oropharynx without erythema/exudate, uvula midline  Neck: +supple, no tenderness/meningismus/JVD, +Trachea midline  Pulm: Bilateral BS, normal resp effort, no wheeze/stridor/retractions  CV: RRR, no M/R/G, +dist pulses  Abd: soft, NT/ND, +BS, no hepatosplenomegaly  Mskel: no edema/erythema/cyanosis  Skin: no rash  Neuro: AAOx3, no gross sensory/motor deficits, none

## 2023-07-20 NOTE — ED CDU PROVIDER INITIAL DAY NOTE - NEURO NEGATIVE STATEMENT, MLM
no loss of consciousness, no gait abnormality, no headache, no sensory deficits, and no weakness. - - -

## 2023-09-18 ENCOUNTER — APPOINTMENT (OUTPATIENT)
Dept: ORTHOPEDIC SURGERY | Facility: CLINIC | Age: 73
End: 2023-09-18
Payer: COMMERCIAL

## 2023-09-18 VITALS — WEIGHT: 170 LBS | BODY MASS INDEX: 27.32 KG/M2 | HEIGHT: 66 IN

## 2023-09-18 DIAGNOSIS — S62.340A NONDISPLACED FRACTURE OF BASE OF SECOND METACARPAL BONE, RIGHT HAND, INITIAL ENCOUNTER FOR CLOSED FRACTURE: ICD-10-CM

## 2023-09-18 PROCEDURE — 26600 TREAT METACARPAL FRACTURE: CPT | Mod: RT

## 2023-09-18 PROCEDURE — 99204 OFFICE O/P NEW MOD 45 MIN: CPT | Mod: 57

## 2023-09-22 ENCOUNTER — APPOINTMENT (OUTPATIENT)
Dept: ORTHOPEDIC SURGERY | Facility: CLINIC | Age: 73
End: 2023-09-22

## 2023-09-26 ENCOUNTER — OUTPATIENT (OUTPATIENT)
Dept: OUTPATIENT SERVICES | Facility: HOSPITAL | Age: 73
LOS: 1 days | End: 2023-09-26
Payer: COMMERCIAL

## 2023-09-26 ENCOUNTER — RESULT REVIEW (OUTPATIENT)
Age: 73
End: 2023-09-26

## 2023-09-26 DIAGNOSIS — Z98.890 OTHER SPECIFIED POSTPROCEDURAL STATES: Chronic | ICD-10-CM

## 2023-09-26 DIAGNOSIS — S62.340A NONDISPLACED FRACTURE OF BASE OF SECOND METACARPAL BONE, RIGHT HAND, INITIAL ENCOUNTER FOR CLOSED FRACTURE: ICD-10-CM

## 2023-09-26 PROCEDURE — 73200 CT UPPER EXTREMITY W/O DYE: CPT | Mod: 26,RT,MH

## 2023-09-26 PROCEDURE — 73200 CT UPPER EXTREMITY W/O DYE: CPT

## 2023-09-28 ENCOUNTER — APPOINTMENT (OUTPATIENT)
Dept: ORTHOPEDIC SURGERY | Facility: CLINIC | Age: 73
End: 2023-09-28
Payer: COMMERCIAL

## 2023-09-28 VITALS — WEIGHT: 170 LBS | BODY MASS INDEX: 27.32 KG/M2 | HEIGHT: 66 IN

## 2023-09-28 PROCEDURE — 99024 POSTOP FOLLOW-UP VISIT: CPT

## 2023-10-12 ENCOUNTER — APPOINTMENT (OUTPATIENT)
Dept: ORTHOPEDIC SURGERY | Facility: CLINIC | Age: 73
End: 2023-10-12
Payer: COMMERCIAL

## 2023-10-12 VITALS — WEIGHT: 170 LBS | HEIGHT: 66 IN | BODY MASS INDEX: 27.32 KG/M2

## 2023-10-12 DIAGNOSIS — M19.011 PRIMARY OSTEOARTHRITIS, RIGHT SHOULDER: ICD-10-CM

## 2023-10-12 DIAGNOSIS — Z00.00 ENCOUNTER FOR GENERAL ADULT MEDICAL EXAMINATION W/OUT ABNORMAL FINDINGS: ICD-10-CM

## 2023-10-12 DIAGNOSIS — M75.41 IMPINGEMENT SYNDROME OF RIGHT SHOULDER: ICD-10-CM

## 2023-10-12 PROCEDURE — 99214 OFFICE O/P EST MOD 30 MIN: CPT

## 2023-10-12 PROCEDURE — 73030 X-RAY EXAM OF SHOULDER: CPT | Mod: RT

## 2023-10-16 ENCOUNTER — APPOINTMENT (OUTPATIENT)
Dept: ORTHOPEDIC SURGERY | Facility: CLINIC | Age: 73
End: 2023-10-16
Payer: COMMERCIAL

## 2023-10-16 VITALS — WEIGHT: 170 LBS | BODY MASS INDEX: 27.32 KG/M2 | HEIGHT: 66 IN

## 2023-10-16 PROCEDURE — 73130 X-RAY EXAM OF HAND: CPT | Mod: RT

## 2023-10-16 PROCEDURE — 99024 POSTOP FOLLOW-UP VISIT: CPT

## 2023-10-23 ENCOUNTER — APPOINTMENT (OUTPATIENT)
Dept: ORTHOPEDIC SURGERY | Facility: CLINIC | Age: 73
End: 2023-10-23

## 2023-10-25 ENCOUNTER — NON-APPOINTMENT (OUTPATIENT)
Age: 73
End: 2023-10-25

## 2023-10-30 ENCOUNTER — APPOINTMENT (OUTPATIENT)
Dept: ORTHOPEDIC SURGERY | Facility: CLINIC | Age: 73
End: 2023-10-30
Payer: COMMERCIAL

## 2023-10-30 VITALS — HEIGHT: 66 IN | WEIGHT: 170 LBS | BODY MASS INDEX: 27.32 KG/M2

## 2023-10-30 DIAGNOSIS — S62.340D: ICD-10-CM

## 2023-10-30 PROCEDURE — 99024 POSTOP FOLLOW-UP VISIT: CPT

## 2023-10-30 NOTE — ED CDU PROVIDER DISPOSITION NOTE - NS_CDULENGTHOFSTAY_ED_A_ED
Health Maintenance Due   Topic Date Due   • COVID-19 Vaccine (1) Never done       Patient is due for topics as listed above but is not proceeding with Immunization(s) COVID-19 at this time. Education provided for Immunization(s) COVID-19.   0 Hour(s) 49 Minute(s)

## 2023-11-27 ENCOUNTER — APPOINTMENT (OUTPATIENT)
Dept: ORTHOPEDIC SURGERY | Facility: CLINIC | Age: 73
End: 2023-11-27

## 2023-12-05 NOTE — ED ADULT NURSE NOTE - OBJECTIVE STATEMENT
c/o cold x 2 - 3 weeks, chest pain started  three weeks ago  diff, breathing burning sensation, denies fever > Hx of DM - non compliment with meds
General

## 2023-12-11 ENCOUNTER — APPOINTMENT (OUTPATIENT)
Dept: ORTHOPEDIC SURGERY | Facility: CLINIC | Age: 73
End: 2023-12-11

## 2024-06-19 DIAGNOSIS — M25.561 PAIN IN RIGHT KNEE: ICD-10-CM

## 2024-06-20 ENCOUNTER — APPOINTMENT (OUTPATIENT)
Dept: ORTHOPEDIC SURGERY | Facility: CLINIC | Age: 74
End: 2024-06-20
Payer: COMMERCIAL

## 2024-06-20 VITALS
DIASTOLIC BLOOD PRESSURE: 74 MMHG | TEMPERATURE: 97.3 F | SYSTOLIC BLOOD PRESSURE: 122 MMHG | HEART RATE: 75 BPM | BODY MASS INDEX: 27.97 KG/M2 | WEIGHT: 174 LBS | HEIGHT: 66 IN

## 2024-06-20 DIAGNOSIS — M17.0 BILATERAL PRIMARY OSTEOARTHRITIS OF KNEE: ICD-10-CM

## 2024-06-20 PROCEDURE — 73564 X-RAY EXAM KNEE 4 OR MORE: CPT | Mod: 50

## 2024-06-20 PROCEDURE — 20610 DRAIN/INJ JOINT/BURSA W/O US: CPT | Mod: RT

## 2024-06-20 PROCEDURE — 99204 OFFICE O/P NEW MOD 45 MIN: CPT | Mod: 25

## 2024-06-20 RX ORDER — MELOXICAM 15 MG/1
15 TABLET ORAL
Qty: 30 | Refills: 0 | Status: ACTIVE | COMMUNITY
Start: 2024-06-20 | End: 1900-01-01

## 2024-06-20 NOTE — HISTORY OF PRESENT ILLNESS
[Pain Location] : pain [] : right knee [Worsening] : worsening [___ mths] : [unfilled] month(s) ago [Constant] : ~He/She~ states the symptoms seem to be constant [Direct Pressure] : worsened by direct pressure [Sitting] : worsened by sitting [Walking] : worsened by walking [Knee Flexion] : worsened with knee flexion [Knee Extension] : worsened with knee extension [NSAIDs] : relieved by nonsteroidal anti-inflammatory drugs [de-identified] : 74 year old male presents to the office today for an initial visit for his bilateral knee pain going on for the past few months. No groin pain or swelling, falls or traumas. Walking, kneeling, going up and down stairs and rising from a seated position all exacerbate pain. The patient denies any falls or trauma and has been using conservative treatment. No physical therapy used for relief of pain. No constitutional symptoms noted. [Physical Therapy] : not relieved by physical therapy [de-identified] : going up and down stairs and rising from a seated position.

## 2024-06-20 NOTE — DISCUSSION/SUMMARY
[Medication Risks Reviewed] : Medication risks reviewed [Surgical risks reviewed] : Surgical risks reviewed [6 Weeks] : in 6 weeks [de-identified] : Patient is a 74-year-old male with mild bilateral knee osteoarthritis presenting today for initial evaluation.  His left knee is currently asymptomatic.  However he is having pain in his right knee.  He is not yet tried conservative treatment I think that is warranted at this time.  Given injection of cortisone in the right knee which she tolerated well.  We discussed low impact activity and exercise.  I recommended physical therapy.  I given prescription meloxicam 15 mg daily as needed for pain.  I will see him back in 6 weeks for repeat evaluation.  All questions were asked and answered

## 2024-06-20 NOTE — PHYSICAL EXAM
[Normal] : Gait: normal [de-identified] : GENERAL APPEARANCE: Well nourished and hydrated, pleasant, alert, and oriented x 3. Appears their stated age.  HEENT: Normocephalic, extraocular eye motion intact. Nasal septum midline. Oral cavity clear. External auditory canal clear.  RESPIRATORY: Breath sounds clear and audible in all lobes. No wheezing, No accessory muscle use. CARDIOVASCULAR: No apparent abnormalities. No lower leg edema. No varicosities. Pedal pulses are palpable. NEUROLOGIC: Sensation is normal, no muscle weakness in the upper or lower extremities. DERMATOLOGIC: No apparent skin lesions, moist, warm, no rash. SPINE: Cervical spine appears normal and moves freely; thoracic spine appears normal and moves freely; lumbosacral spine appears normal and moves freely, normal, nontender. MUSCULOSKELETAL: Hands, wrists, and elbows are normal and move freely, shoulders are normal and move freely.  Psychiatric: Oriented to person, place, and time, insight and judgement were intact and the affect was normal.   [de-identified] : Right knee exam shows no effusion, ROM is 0-130 degrees, no instability, no pain with Consuelo, no joint line tenderness. 5/5 motor strength in bilateral lower extremities. Sensory: Intact in bilateral lower extremities. DTRs: Biceps, brachioradialis, triceps, patellar, ankle and plantar 2+ and symmetric bilaterally. Pulses: dorsalis pedis, posterior tibial, femoral, popliteal, and radial 2+ and symmetric bilaterally. [de-identified] : 4 views of bilateral knees obtained the office today show no acute fracture or dislocation.  There is mild lateral joint space narrowing patellofemoral arthritis consistent with Kellgren-Evelio grade 1 2 changes

## 2024-06-20 NOTE — PROCEDURE
[de-identified] : I injected his right knee. I discussed at length with the patient the planned steroid and lidocaine injection. The risks, benefits, convalescence and alternatives were reviewed. The possible side effects discussed included but were not limited to: pain, swelling, heat, bleeding, and redness. Symptoms are generally mild but if they are extensive then contact the office. Giving pain relievers by mouth such as NSAIDs or Tylenol can generally treat the reactions to steroid and lidocaine. Rare cases of infection have been noted. Rash, hives and itching may occur post injection. If you have muscle pain or cramps, flushing and or swelling of the face, rapid heart beat, nausea, dizziness, fever, chills, headache, difficulty breathing, swelling in the arms or legs, or have a prickly feeling of your skin, contact a health care provider immediately. Following this discussion, the knee was prepped with Alcohol and under sterile condition the 80 mg Depo-Medrol and 6 cc Lidocaine injection was performed with a 20 gauge needle through a superolateral injection site. The needle was introduced into the joint, aspiration was performed to ensure intra-articular placement and the medication was injected. Upon withdrawal of the needle the site was cleaned with alcohol and a band aid applied. The patient tolerated the injection well and there were no adverse effects. Post injection instructions included no strenuous activity for 24 hours, cryotherapy and if there are any adverse effects to contact the office.

## 2024-06-20 NOTE — ADDENDUM
[FreeTextEntry1] : This note was written by Tania Dominguez, acting as the  for Dr. Coleman. This note accurately reflects the work and decisions made by Dr. Coleman.

## 2024-09-10 ENCOUNTER — APPOINTMENT (OUTPATIENT)
Dept: ORTHOPEDIC SURGERY | Facility: CLINIC | Age: 74
End: 2024-09-10
Payer: MEDICARE

## 2024-09-10 VITALS
DIASTOLIC BLOOD PRESSURE: 69 MMHG | HEIGHT: 66 IN | SYSTOLIC BLOOD PRESSURE: 108 MMHG | BODY MASS INDEX: 27.97 KG/M2 | HEART RATE: 63 BPM | WEIGHT: 174 LBS

## 2024-09-10 DIAGNOSIS — M17.0 BILATERAL PRIMARY OSTEOARTHRITIS OF KNEE: ICD-10-CM

## 2024-09-10 PROCEDURE — 99203 OFFICE O/P NEW LOW 30 MIN: CPT

## 2024-09-10 PROCEDURE — G2211 COMPLEX E/M VISIT ADD ON: CPT

## 2024-09-10 PROCEDURE — 99213 OFFICE O/P EST LOW 20 MIN: CPT

## 2024-09-10 NOTE — PHYSICAL EXAM
[de-identified] : Right knee exam shows no effusion, ROM is 0-130 degrees, no instability, no pain with Consuelo, no joint line tenderness. 5/5 motor strength in bilateral lower extremities. Sensory: Intact in bilateral lower extremities. DTRs: Biceps, brachioradialis, triceps, patellar, ankle and plantar 2+ and symmetric bilaterally. Pulses: dorsalis pedis, posterior tibial, femoral, popliteal, and radial 2+ and symmetric bilaterally.

## 2024-09-10 NOTE — DISCUSSION/SUMMARY
[Medication Risks Reviewed] : Medication risks reviewed [Surgical risks reviewed] : Surgical risks reviewed [de-identified] : 74-year-old male presents to the office for follow-up of his mild right knee osteoarthritis.  He responded well to his cortisone injection at his last visit.  He does still experience intermittent pain and discomfort but has not yet tried formalized physical therapy.  I therefore recommend a course of physical therapy and provided a referral for that today.  We discussed low impact activity and exercise.  The patient may take meloxicam 15 mg daily as needed for pain.  The patient reports good result from viscosupplementation in the past.  The patient should follow-up in 3 months for repeat evaluation and possible initiation of viscosupplementation.  All questions addressed, patient in agreement.

## 2024-09-10 NOTE — HISTORY OF PRESENT ILLNESS
[de-identified] : 74-year-old male presents to the office for follow-up of his right knee pain after receiving a cortisone injection at his last visit.  Patient states that the cortisone injection did provide some relief of his pain.  He does however still feel a tightness and pulling in his knee and thigh.  He states that this is made worse by standing, ambulating arising from a seated position.  He has not been taking any medication and has not had a chance to attend physical therapy.  He does report 100% relief of his pain from viscosupplementation in the past, he states that this was about 7 years ago.  Ambulates without use of assistive device.  Denies any recent injuries falls or trauma, neurovascular compromise.

## 2024-11-21 RX ORDER — HYALURONATE SODIUM 20 MG/2 ML
20 SYRINGE (ML) INTRAARTICULAR
Qty: 1 | Refills: 0 | Status: ACTIVE | OUTPATIENT
Start: 2024-11-21

## 2024-12-10 ENCOUNTER — APPOINTMENT (OUTPATIENT)
Dept: ORTHOPEDIC SURGERY | Facility: CLINIC | Age: 74
End: 2024-12-10
Payer: COMMERCIAL

## 2024-12-10 VITALS
BODY MASS INDEX: 27.97 KG/M2 | HEIGHT: 66 IN | HEART RATE: 61 BPM | WEIGHT: 174 LBS | SYSTOLIC BLOOD PRESSURE: 114 MMHG | DIASTOLIC BLOOD PRESSURE: 71 MMHG

## 2024-12-10 PROCEDURE — 99213 OFFICE O/P EST LOW 20 MIN: CPT | Mod: 25

## 2024-12-10 PROCEDURE — 20610 DRAIN/INJ JOINT/BURSA W/O US: CPT | Mod: RT

## 2024-12-18 ENCOUNTER — APPOINTMENT (OUTPATIENT)
Dept: ORTHOPEDIC SURGERY | Facility: CLINIC | Age: 74
End: 2024-12-18
Payer: COMMERCIAL

## 2024-12-18 PROCEDURE — 20610 DRAIN/INJ JOINT/BURSA W/O US: CPT | Mod: RT

## 2024-12-23 ENCOUNTER — APPOINTMENT (OUTPATIENT)
Dept: ORTHOPEDIC SURGERY | Facility: CLINIC | Age: 74
End: 2024-12-23
Payer: COMMERCIAL

## 2024-12-23 DIAGNOSIS — M17.0 BILATERAL PRIMARY OSTEOARTHRITIS OF KNEE: ICD-10-CM

## 2024-12-23 PROCEDURE — 20610 DRAIN/INJ JOINT/BURSA W/O US: CPT | Mod: RT

## 2024-12-25 PROBLEM — F10.90 ALCOHOL USE: Status: ACTIVE | Noted: 2021-04-15

## 2025-02-08 ENCOUNTER — NON-APPOINTMENT (OUTPATIENT)
Age: 75
End: 2025-02-08

## 2025-02-22 ENCOUNTER — RESULT REVIEW (OUTPATIENT)
Age: 75
End: 2025-02-22

## 2025-02-22 ENCOUNTER — INPATIENT (INPATIENT)
Facility: HOSPITAL | Age: 75
LOS: 3 days | Discharge: HOME | DRG: 243 | End: 2025-02-26
Attending: STUDENT IN AN ORGANIZED HEALTH CARE EDUCATION/TRAINING PROGRAM | Admitting: STUDENT IN AN ORGANIZED HEALTH CARE EDUCATION/TRAINING PROGRAM
Payer: COMMERCIAL

## 2025-02-22 VITALS
TEMPERATURE: 97 F | DIASTOLIC BLOOD PRESSURE: 76 MMHG | OXYGEN SATURATION: 99 % | RESPIRATION RATE: 18 BRPM | WEIGHT: 175.05 LBS | SYSTOLIC BLOOD PRESSURE: 187 MMHG | HEART RATE: 43 BPM | HEIGHT: 66 IN

## 2025-02-22 DIAGNOSIS — I44.2 ATRIOVENTRICULAR BLOCK, COMPLETE: ICD-10-CM

## 2025-02-22 DIAGNOSIS — H26.9 UNSPECIFIED CATARACT: Chronic | ICD-10-CM

## 2025-02-22 DIAGNOSIS — Z98.890 OTHER SPECIFIED POSTPROCEDURAL STATES: Chronic | ICD-10-CM

## 2025-02-22 DIAGNOSIS — Z90.49 ACQUIRED ABSENCE OF OTHER SPECIFIED PARTS OF DIGESTIVE TRACT: Chronic | ICD-10-CM

## 2025-02-22 LAB
ALBUMIN SERPL ELPH-MCNC: 4 G/DL — SIGNIFICANT CHANGE UP (ref 3.3–5.2)
ALP SERPL-CCNC: 113 U/L — SIGNIFICANT CHANGE UP (ref 40–120)
ALT FLD-CCNC: 22 U/L — SIGNIFICANT CHANGE UP
ANION GAP SERPL CALC-SCNC: 15 MMOL/L — SIGNIFICANT CHANGE UP (ref 5–17)
AST SERPL-CCNC: 19 U/L — SIGNIFICANT CHANGE UP
BASOPHILS # BLD AUTO: 0.04 K/UL — SIGNIFICANT CHANGE UP (ref 0–0.2)
BASOPHILS NFR BLD AUTO: 0.6 % — SIGNIFICANT CHANGE UP (ref 0–2)
BILIRUB SERPL-MCNC: 0.8 MG/DL — SIGNIFICANT CHANGE UP (ref 0.4–2)
BUN SERPL-MCNC: 32.2 MG/DL — HIGH (ref 8–20)
CALCIUM SERPL-MCNC: 9.1 MG/DL — SIGNIFICANT CHANGE UP (ref 8.4–10.5)
CHLORIDE SERPL-SCNC: 102 MMOL/L — SIGNIFICANT CHANGE UP (ref 96–108)
CO2 SERPL-SCNC: 25 MMOL/L — SIGNIFICANT CHANGE UP (ref 22–29)
CREAT SERPL-MCNC: 1.81 MG/DL — HIGH (ref 0.5–1.3)
EGFR: 39 ML/MIN/1.73M2 — LOW
EOSINOPHIL # BLD AUTO: 0.09 K/UL — SIGNIFICANT CHANGE UP (ref 0–0.5)
EOSINOPHIL NFR BLD AUTO: 1.3 % — SIGNIFICANT CHANGE UP (ref 0–6)
FLUAV AG NPH QL: SIGNIFICANT CHANGE UP
FLUBV AG NPH QL: SIGNIFICANT CHANGE UP
GAS PNL BLDV: SIGNIFICANT CHANGE UP
GLUCOSE BLDC GLUCOMTR-MCNC: 203 MG/DL — HIGH (ref 70–99)
GLUCOSE SERPL-MCNC: 180 MG/DL — HIGH (ref 70–99)
HCT VFR BLD CALC: 41.2 % — SIGNIFICANT CHANGE UP (ref 39–50)
HGB BLD-MCNC: 13.7 G/DL — SIGNIFICANT CHANGE UP (ref 13–17)
IMM GRANULOCYTES # BLD AUTO: 0.05 K/UL — SIGNIFICANT CHANGE UP (ref 0–0.07)
IMM GRANULOCYTES NFR BLD AUTO: 0.7 % — SIGNIFICANT CHANGE UP (ref 0–0.9)
LACTATE SERPL-SCNC: 1.3 MMOL/L — SIGNIFICANT CHANGE UP (ref 0.5–2)
LYMPHOCYTES # BLD AUTO: 1.85 K/UL — SIGNIFICANT CHANGE UP (ref 1–3.3)
LYMPHOCYTES NFR BLD AUTO: 26.4 % — SIGNIFICANT CHANGE UP (ref 13–44)
MAGNESIUM SERPL-MCNC: 2.3 MG/DL — SIGNIFICANT CHANGE UP (ref 1.8–2.6)
MCHC RBC-ENTMCNC: 28.8 PG — SIGNIFICANT CHANGE UP (ref 27–34)
MCHC RBC-ENTMCNC: 33.3 G/DL — SIGNIFICANT CHANGE UP (ref 32–36)
MCV RBC AUTO: 86.7 FL — SIGNIFICANT CHANGE UP (ref 80–100)
MONOCYTES # BLD AUTO: 0.38 K/UL — SIGNIFICANT CHANGE UP (ref 0–0.9)
MONOCYTES NFR BLD AUTO: 5.4 % — SIGNIFICANT CHANGE UP (ref 2–14)
NEUTROPHILS # BLD AUTO: 4.6 K/UL — SIGNIFICANT CHANGE UP (ref 1.8–7.4)
NEUTROPHILS NFR BLD AUTO: 65.6 % — SIGNIFICANT CHANGE UP (ref 43–77)
NRBC # BLD AUTO: 0 K/UL — SIGNIFICANT CHANGE UP (ref 0–0)
NRBC # FLD: 0 K/UL — SIGNIFICANT CHANGE UP (ref 0–0)
NRBC BLD AUTO-RTO: 0 /100 WBCS — SIGNIFICANT CHANGE UP (ref 0–0)
NT-PROBNP SERPL-SCNC: 232 PG/ML — SIGNIFICANT CHANGE UP (ref 0–300)
PLATELET # BLD AUTO: 170 K/UL — SIGNIFICANT CHANGE UP (ref 150–400)
PMV BLD: 10.9 FL — SIGNIFICANT CHANGE UP (ref 7–13)
POTASSIUM SERPL-MCNC: 5.2 MMOL/L — SIGNIFICANT CHANGE UP (ref 3.5–5.3)
POTASSIUM SERPL-SCNC: 5.2 MMOL/L — SIGNIFICANT CHANGE UP (ref 3.5–5.3)
PROT SERPL-MCNC: 6.9 G/DL — SIGNIFICANT CHANGE UP (ref 6.6–8.7)
RBC # BLD: 4.75 M/UL — SIGNIFICANT CHANGE UP (ref 4.2–5.8)
RBC # FLD: 12.2 % — SIGNIFICANT CHANGE UP (ref 10.3–14.5)
RSV RNA NPH QL NAA+NON-PROBE: SIGNIFICANT CHANGE UP
SARS-COV-2 RNA SPEC QL NAA+PROBE: SIGNIFICANT CHANGE UP
SODIUM SERPL-SCNC: 141 MMOL/L — SIGNIFICANT CHANGE UP (ref 135–145)
TROPONIN T, HIGH SENSITIVITY RESULT: 37 NG/L — SIGNIFICANT CHANGE UP (ref 0–51)
WBC # BLD: 7.01 K/UL — SIGNIFICANT CHANGE UP (ref 3.8–10.5)
WBC # FLD AUTO: 7.01 K/UL — SIGNIFICANT CHANGE UP (ref 3.8–10.5)

## 2025-02-22 PROCEDURE — 99232 SBSQ HOSP IP/OBS MODERATE 35: CPT

## 2025-02-22 PROCEDURE — 99291 CRITICAL CARE FIRST HOUR: CPT

## 2025-02-22 PROCEDURE — 93010 ELECTROCARDIOGRAM REPORT: CPT

## 2025-02-22 PROCEDURE — 71045 X-RAY EXAM CHEST 1 VIEW: CPT | Mod: 26

## 2025-02-22 PROCEDURE — 93306 TTE W/DOPPLER COMPLETE: CPT | Mod: 26

## 2025-02-22 RX ORDER — DEXTROSE 50 % IN WATER 50 %
25 SYRINGE (ML) INTRAVENOUS ONCE
Refills: 0 | Status: DISCONTINUED | OUTPATIENT
Start: 2025-02-22 | End: 2025-02-26

## 2025-02-22 RX ORDER — INSULIN GLARGINE-YFGN 100 [IU]/ML
15 INJECTION, SOLUTION SUBCUTANEOUS AT BEDTIME
Refills: 0 | Status: DISCONTINUED | OUTPATIENT
Start: 2025-02-22 | End: 2025-02-26

## 2025-02-22 RX ORDER — GLUCAGON 3 MG/1
1 POWDER NASAL ONCE
Refills: 0 | Status: DISCONTINUED | OUTPATIENT
Start: 2025-02-22 | End: 2025-02-26

## 2025-02-22 RX ORDER — DOPAMINE HYDROCHLORIDE 80 MG/100ML
2 INJECTION, SOLUTION INTRAVENOUS
Qty: 400 | Refills: 0 | Status: DISCONTINUED | OUTPATIENT
Start: 2025-02-22 | End: 2025-02-23

## 2025-02-22 RX ORDER — ATORVASTATIN CALCIUM 80 MG/1
40 TABLET, FILM COATED ORAL AT BEDTIME
Refills: 0 | Status: DISCONTINUED | OUTPATIENT
Start: 2025-02-22 | End: 2025-02-26

## 2025-02-22 RX ORDER — ONDANSETRON HCL/PF 4 MG/2 ML
4 VIAL (ML) INJECTION ONCE
Refills: 0 | Status: DISCONTINUED | OUTPATIENT
Start: 2025-02-22 | End: 2025-02-26

## 2025-02-22 RX ORDER — EMPAGLIFLOZIN 25 MG/1
1 TABLET, FILM COATED ORAL
Refills: 0 | DISCHARGE

## 2025-02-22 RX ORDER — DEXTROSE 50 % IN WATER 50 %
15 SYRINGE (ML) INTRAVENOUS ONCE
Refills: 0 | Status: DISCONTINUED | OUTPATIENT
Start: 2025-02-22 | End: 2025-02-26

## 2025-02-22 RX ORDER — HEPARIN SODIUM 1000 [USP'U]/ML
5000 INJECTION INTRAVENOUS; SUBCUTANEOUS EVERY 8 HOURS
Refills: 0 | Status: DISCONTINUED | OUTPATIENT
Start: 2025-02-22 | End: 2025-02-24

## 2025-02-22 RX ORDER — INFLUENZA A VIRUS A/IDAHO/07/2018 (H1N1) ANTIGEN (MDCK CELL DERIVED, PROPIOLACTONE INACTIVATED, INFLUENZA A VIRUS A/INDIANA/08/2018 (H3N2) ANTIGEN (MDCK CELL DERIVED, PROPIOLACTONE INACTIVATED), INFLUENZA B VIRUS B/SINGAPORE/INFTT-16-0610/2016 ANTIGEN (MDCK CELL DERIVED, PROPIOLACTONE INACTIVATED), INFLUENZA B VIRUS B/IOWA/06/2017 ANTIGEN (MDCK CELL DERIVED, PROPIOLACTONE INACTIVATED) 15; 15; 15; 15 UG/.5ML; UG/.5ML; UG/.5ML; UG/.5ML
0.5 INJECTION, SUSPENSION INTRAMUSCULAR ONCE
Refills: 0 | Status: DISCONTINUED | OUTPATIENT
Start: 2025-02-22 | End: 2025-02-26

## 2025-02-22 RX ORDER — INSULIN LISPRO 100 U/ML
INJECTION, SOLUTION INTRAVENOUS; SUBCUTANEOUS
Refills: 0 | Status: DISCONTINUED | OUTPATIENT
Start: 2025-02-22 | End: 2025-02-26

## 2025-02-22 RX ORDER — SODIUM CHLORIDE 9 G/1000ML
1000 INJECTION, SOLUTION INTRAVENOUS
Refills: 0 | Status: DISCONTINUED | OUTPATIENT
Start: 2025-02-22 | End: 2025-02-26

## 2025-02-22 RX ORDER — CLOPIDOGREL BISULFATE 75 MG/1
75 TABLET, FILM COATED ORAL DAILY
Refills: 0 | Status: DISCONTINUED | OUTPATIENT
Start: 2025-02-23 | End: 2025-02-26

## 2025-02-22 RX ORDER — HEPARIN SODIUM 1000 [USP'U]/ML
7500 INJECTION INTRAVENOUS; SUBCUTANEOUS EVERY 8 HOURS
Refills: 0 | Status: DISCONTINUED | OUTPATIENT
Start: 2025-02-22 | End: 2025-02-22

## 2025-02-22 RX ADMIN — DOPAMINE HYDROCHLORIDE 5.96 MICROGRAM(S)/KG/MIN: 80 INJECTION, SOLUTION INTRAVENOUS at 16:06

## 2025-02-22 RX ADMIN — ATORVASTATIN CALCIUM 40 MILLIGRAM(S): 80 TABLET, FILM COATED ORAL at 22:48

## 2025-02-22 RX ADMIN — HEPARIN SODIUM 5000 UNIT(S): 1000 INJECTION INTRAVENOUS; SUBCUTANEOUS at 22:49

## 2025-02-22 RX ADMIN — INSULIN GLARGINE-YFGN 15 UNIT(S): 100 INJECTION, SOLUTION SUBCUTANEOUS at 22:49

## 2025-02-22 NOTE — H&P ADULT - NS ATTEND AMEND GEN_ALL_CORE FT
Pt seen and examined overnight after arrival to the MICU   74M smoker with hx of CAD s/p PCI, DM, HTN, HLD, s/p cholecystectomy, s/p cataracts, elevated PSA presented to the ED after syncopal episode. Pt reports he had a syncopal episode a few weeks ago for the first time and was evaluated by cardiologist outpatient w/ stress test which was negative. He reports since then has had intermittent episodes of recurrent lightheadedness and fatigue and had 2 additional episodes of syncope at home. He denies any associated trauma with these episodes. He was advised by his cardiologist to present to the ED. He had recurrent episode of syncope in the ED and was ultimately found to be in third degree heart block with ventricular escape 20s and pauses up to 6.2 sec. Pt was evaluated by EP who recommended placement of TVP which was placed by ED. Pt is on metoprolol but denies taking excess. Denies any prior hx of known heart arrythmia. Denies prior tick bites. He denies any recent fevers/chills. Denies any current chest pain/shortness of breath/nausea/vomiting/abdominal pain/ LE edema. Labs notable for ANSHU w/ Cr 1.81, lactate 2.3, trop neg. Pt with brief loss of capture with transfer but otherwise has been pacing at 80bpm at 15mAmp. Echo completed and awaiting results. Will f/u with EP regarding timing of PPM placement. Started on Lantus 15u and will adjust based on sliding scale requirements. ANSHU likely secondary to ATN in setting of CHB improving on repeat labs. Maintain ICU care pending PPM placement.

## 2025-02-22 NOTE — H&P ADULT - HISTORY OF PRESENT ILLNESS
73yo M with PMH: CAD s/p stent x2 in Oct 2019, DM-poorly controlled, last A1C 12 per patient, HTN, HLD, s/p cholecystectomy, s/p bilat cataracts removed.  Smoker 4-5 cigarettes daily, elevated PSA states told elevated 18 months ago, hasn't followed up. Urinates 3-4 times a night  Presented from home for syncopal events lasting a few seconds. Family called cardiologist who advised going to ED. Patient states that he has felt weak/lightheaded for last several days and had a similar syncopal event about a month ago.  In ED patient in/out of consciousness initially, diaphoretic/dizzy, did have another short syncopal episode in ED  Found to have complete heart block on ECG, rate 20-30s,   EP consulted who advised for Transvenous Pacer placement and admission to ICU with eventual PPM placement  Started Dopamine infusion to augment heart rate

## 2025-02-22 NOTE — H&P ADULT - SOCIAL HISTORY: ALCOHOL USE
You are likely experiencing a viral bronchitis. You had a chest x-ray performed today which was negative for any pneumonia.    Begin prednisone once daily for 5 days. Make sure to take this in the morning as it gives you energy. You can also use an albuterol inhaler every 4-6 hours as needed for chest tightness or wheezing.      If you begin to experience any persistent or worsening symptoms return to the clinic or go to the emergency room.     occasional

## 2025-02-22 NOTE — ED PROVIDER NOTE - CLINICAL SUMMARY MEDICAL DECISION MAKING FREE TEXT BOX
Patient presenting with syncope and bradycardia and chest pain.  Noted to have third-degree heart block on triage EKG.  Blood pressure critical care area.  Patient awake and alert with stable blood pressure.  Will place pacer pads on patient, urgent cardiac workup and EP consult

## 2025-02-22 NOTE — ED ADULT TRIAGE NOTE - CHIEF COMPLAINT QUOTE
as per daughter had syncopal episode at home PTA. report + LOC for "a few seconds" daughter states called cards office and was told to come to ED asap. hx of 2 stents. pt in and out of consciousness in ED, diaphoretic and c/o of dizziness. ekg obtained

## 2025-02-22 NOTE — ED ADULT NURSE NOTE - OBJECTIVE STATEMENT
Pt A&OX4. Came in w/ c/o generalized weakness, dizziness x1 week along with x2 syncopal episodes in the last few days. Pt has a hx of x2 stents and uncontrolled diabetes as per daughter at bedside. Pt found to be bradycardic into the 20's, RR even and unlabored. Pt brought to CC, placed on cardiac monitor and continuous pulse ox.

## 2025-02-22 NOTE — H&P ADULT - NSICDXPASTMEDICALHX_GEN_ALL_CORE_FT
PAST MEDICAL HISTORY:  Bilateral cataracts     BPH with elevated PSA     CAD (coronary artery disease)     DM (diabetes mellitus)     Gall bladder stones     HLD (hyperlipidemia)     HTN (hypertension)

## 2025-02-22 NOTE — H&P ADULT - NSHPREVIEWOFSYSTEMS_GEN_ALL_CORE
REVIEW OF SYSTEMS    General:	felt ok    Skin/Breast: denies rashes, lesions  	  Ophthalmologic: denies vision changes   	  ENMT:	denies dysphagia, denies tinnitus    Respiratory and Thorax: denies cough/sputum, denies SOB  	  Cardiovascular:	Feels some chest pressure with current situation, new has not had before    Gastrointestinal:	denies nausea/vomiting    Genitourinary:	gets up 3-4 times nightly for urination    Musculoskeletal:	denies weakness    Neurological:	denies numb/tingle    Psychiatric:	denies depression, denies SI/HI    Hematology/Lymphatics:	denies nodes    Endocrine:	acknowledges poor glucose control, poor compliance, has some polyuria at night    Allergic/Immunologic:	denies URI symptoms, rashes

## 2025-02-22 NOTE — ED PROVIDER NOTE - OBJECTIVE STATEMENT
Patient is a 74-year-old male with history of CAD with stents, diabetes, high blood pressure, high cholesterol presenting with syncope.  Patient has been feeling lightheaded and general weakness for the past several days.  This morning had an episode of syncope.  Patient is a 49-year-old male his cardiologist who instructed him to emergency department.  While in the waiting room patient had a second echo to syncope.  Patient sent back to the ED for further evaluation.  Patient denies any recent trauma, fevers or chills.  Urinary complaints, nausea, diarrhea.  Patient states he does have substernal chest pain with mild shortness of breath at this time.

## 2025-02-22 NOTE — H&P ADULT - NSICDXPASTSURGICALHX_GEN_ALL_CORE_FT
PAST SURGICAL HISTORY:  Bilateral cataracts     H/O cardiac catheterization     S/P cholecystectomy

## 2025-02-22 NOTE — ED PROVIDER NOTE - PROGRESS NOTE DETAILS
AJM: pt with 3rd degree heart block and pauses. seen by EP who reccs TVP. accepted to MICU. TVP to be placed by dr rolon

## 2025-02-22 NOTE — H&P ADULT - ASSESSMENT
ICU ASSESSMENT AND PLAN:   75yo M with PMH: CAD s/p stent x2 in Oct 2019, DM-poorly controlled, last A1C 12 per patient, HTN, HLD, s/p cholecystectomy, s/p bilat cataracts removed.  Smoker 4-5 cigarettes daily, elevated PSA states told elevated 18 months ago, hasn't followed up. Urinates 3-4 times a night  Presented from home for syncopal events lasting a few seconds. Family called cardiologist who advised going to ED. Patient states that he has felt weak/lightheaded for last several days and had a similar syncopal event about a month ago.  In ED patient in/out of consciousness initially, diaphoretic/dizzy, did have another short syncopal episode in ED  Found to have complete heart block on ECG, rate 20-30s,   EP consulted who advised for Transvenous Pacer placement and admission to ICU with eventual PPM placement  Started Dopamine infusion to augment heart rate    Will admit to ICU for critical care monitoring    1- Recurrent syncope with findings of Complete heart block, s/p TVP placement with good capture  2- Poorly controlled diabetes with A1C 11. 6  3- CAD s/p stent x2, current smoker  4- Elevated PSA by report with frequent urination, concern for possible Prostate Cancer vs just BPH  5- smoker  6- ANSHU    - Appreciate EP input  - Echocardiogram ordered  - Appreciate ED placement of Transvenous pacing wire  - Will made NPO after midnight Sunday for possible PPM monday   - Diabetic education  - Insulin sliding scale  - Lantus 15u at bedtime, will likely need to adjust based on bloodwork  - Serial BMP to  monitor renal function  - Consider Renal sono if renal function does not improve  - Bladder Scan for possible urinary retention and ANSHU  - encourage smoking cessation  - Avoid renal toxic agents  - Renal dose all medications  - Supplement electrolytes, maintain K>4.0, Phos>3.0, Mg>2.0      Case discussed with Dr. Julien Chong, ICU Attending

## 2025-02-22 NOTE — PATIENT PROFILE ADULT - FALL HARM RISK - FACTORS
Call your healthcare provider right away if you get any of the following signs or symptoms of bleeding problems:   * Pain, color, or temperature changes to any part of your body   * Headaches, dizziness or weakness   * Unusual bruising (unexplained or growing in size)   * Nosebleeds   * Bleeding gums   * Bleeding from cuts that take a long time to stop   * Menstrual bleeding or vaginal bleeding that is heavier than normal   * Pink or brown urine   * Red or black stools   * Coughing up blood   * Vomiting blood or material that looks like coffee grounds    Update Anticoagulation Clinic (Coumadin Clinic) with any of the following:   * Medication changes (new, stopped or dose changes, this includes over-the-counter medications and supplements)   * Planning on having any surgery, medical or dental procedures   * Diet changes   * Falls  (you should go to Emergency Department immediately for evaluation, then notify Anticoagulation Clinic)    Please, do not wait until your next appointment to update us on changes.         Dizziness/Syncope

## 2025-02-22 NOTE — ED ADULT NURSE NOTE - NSFALLHARMRISKINTERV_ED_ALL_ED
Assistance OOB with selected safe patient handling equipment if applicable/Assistance with ambulation/Communicate risk of Fall with Harm to all staff, patient, and family/Encourage patient to sit up slowly, dangle for a short time, stand at bedside before walking/Monitor gait and stability/Orthostatic vital signs/Provide visual cue: red socks, yellow wristband, yellow gown, etc/Reinforce activity limits and safety measures with patient and family/Bed in lowest position, wheels locked, appropriate side rails in place/Call bell, personal items and telephone in reach/Instruct patient to call for assistance before getting out of bed/chair/stretcher/Non-slip footwear applied when patient is off stretcher/[place] to call system/Physically safe environment - no spills, clutter or unnecessary equipment/Purposeful Proactive Rounding/Room/bathroom lighting operational, light cord in reach

## 2025-02-22 NOTE — CONSULT NOTE ADULT - SUBJECTIVE AND OBJECTIVE BOX
CARDIAC ELECTROPHYSIOLOGY  [facility]   Office: [address] [place] [address]  Telephone: [phone] Fax:[phone]    Primary cardiologist Dr. Villegas      HPI:  74 year old Male with CAD (previous PCI to RCA & LAD in 2017), poorly controlled Type 2 DM with previous A1c of 11, HLD who presented to [facility] after sustaining 2 syncopal episodes earlier today.  Pt found to be in CHB, symptomatic feeling extremely lethargic.  Pt does admit to having a syncopal episode about 1 month ago and denies any starting any new medications recently.  Because of pt's symptoms, only able to obtain partial history from patient and the rest was obtained from the patients chart.  As per past medical visits, pt was on Metoprolol 25mg bid, DAPT for his CAD; and Farxiga, metformin, and lantus for his DM.  Previous EKG's reviewed from 4/28/21 revealed SR with RBBB.  Pt now with CHB with rates down to 30bpm, pauses up to 6.2 seconds and highly symptomatic.    Cardiology Summary:    C 4/28/21 Patent previously placed RCA with mild LAD in stent stenosis-20%; IFR pLAD 0.91 nFFR normal. NO INTERVENTIONS. Medical management  ?  TTE 11/18/19 Summary:   1. Technically good study.   2. Normal global left ventricular systolic function.   3. Left ventricular ejection fraction, by visual estimation, is 60 to   65%.   4. Spectral Doppler shows impaired relaxation pattern of left   ventricular myocardial filling (Grade I diastolic dysfunction).   5. There is no evidence of pericardial effusion.   6. No significant valvular heart disease.    ECG 4/28/21 SR 70bpm with Right axis deviation and RBBB. P-R Interval 180 ms, QRS Duration 138 ms        PAST MEDICAL & SURGICAL HISTORY:  DM (diabetes mellitus)  HLD (hyperlipidemia)  CAD (coronary artery disease)  H/O cardiac catheterization      REVIEW OF SYSTEMS:    CONSTITUTIONAL: No fever, weight loss, or fatigue  EYES: No visual disturbances  NECK: No pain or stiffness  RESPIRATORY: No cough, wheezing, chills or hemoptysis; No shortness of breath  CARDIOVASCULAR: see HPI  GASTROINTESTINAL: No abdominal or epigastric pain. No nausea, vomiting, or hematemesis; No diarrhea or constipation. No melena or hematochezia.  NEUROLOGICAL: No headaches, memory loss, loss of strength, numbness, or tremors  SKIN: No itching, burning, rashes, or lesions   LYMPH NODES: No enlarged glands  ENDOCRINE: No heat or cold intolerance; No hair loss  PSYCHIATRIC: No depression, anxiety, mood swings, or difficulty sleeping  HEME/LYMPH: No easy bruising, or bleeding gums      MEDICATIONS  (STANDING):  DOPamine Infusion 2 MICROgram(s)/kG/Min (5.96 mL/Hr) IV Continuous <Continuous>          Allergies  No Known Allergies        SOCIAL HISTORY:  Unable to obtain as pt is symptomatic from CHB and MICU team called urgently to place Temporary Pacing Wire    FAMILY HISTORY:  No pertinent family history in first degree relatives        Vital Signs Last 24 Hrs  T(C): 36.3 (22 Feb 2025 15:13), Max: 36.3 (22 Feb 2025 15:13)  T(F): 97.4 (22 Feb 2025 15:13), Max: 97.4 (22 Feb 2025 15:13)  HR: 39 (22 Feb 2025 16:04) (39 - 43)  BP: 134/64 (22 Feb 2025 16:04) (134/64 - 187/76)  BP(mean): 86 (22 Feb 2025 16:04) (86 - 86)  RR: 18 (22 Feb 2025 16:04) (18 - 18)  SpO2: 99% (22 Feb 2025 16:04) (99% - 99%)    Parameters below as of 22 Feb 2025 16:04  Patient On (Oxygen Delivery Method): room air        Physical Exam:  Constitutional: Lethargic but arousable to verbal stimuli   Cardiovascular: +S1S2, no murmurs, rubs, gallops   Pulmonary: CTA b/l, unlabored, no wheezes, rales. rhonci  Abdomen: soft NTND  Extremities: no edema b/l  Neuro: non focal, speech clear, POTTS x 4    LABS:                        13.7   7.01  )-----------( 170      ( 22 Feb 2025 15:50 )             41.2           A/P  74 year old Male with CAD (previous PCI to RCA & LAD in 2017), poorly controlled Type 2 DM with previous A1c of 11, HLD who presented to [facility] after sustaining 2 syncopal episodes earlier today.  Pt found to be in CHB, symptomatic feeling extremely lethargic.  Pt does admit to having a syncopal episode about 1 month ago and denies any starting any new medications recently.  Because of pt's symptoms, only able to obtain partial history from patient and the rest was obtained from the patients chart.  As per past medical visits, pt was on Metoprolol 25mg bid, DAPT for his CAD; and Farxiga, metformin, and lantus for his DM.  Previous EKG's reviewed from 4/28/21 revealed SR with RBBB.  Pt now with CHB with rates down to 30bpm, pauses up to 6.2 seconds and highly symptomatic.    Telemetry: CHB with rates as low as to 30bpm and pauses up to 6.2 seconds long    Recommendations  -Pt currently with zoll pads on and zoll at bedside.  Maintain zoll pads.  -Start a dopamine gtt for symptomatic bradycardia.  -Admit to MICU and placement of a Temporary Transvenous Pacing Wire.  Once Wire is confirmed in good position with good capture can then wean of dopamine  -TTE to assess LV function  -Thyroid function tests  -NPO at midnight on Sunday night for PPM on 2/24.  -Pt seen with and telemetry reviewed with Dr Bledsoe, Further recommendations to follow

## 2025-02-22 NOTE — PATIENT PROFILE ADULT - FALL HARM RISK - HARM RISK INTERVENTIONS
Assistance with ambulation/Assistance OOB with selected safe patient handling equipment/Communicate Risk of Fall with Harm to all staff/Monitor gait and stability/Reinforce activity limits and safety measures with patient and family/Sit up slowly, dangle for a short time, stand at bedside before walking/Tailored Fall Risk Interventions/Visual Cue: Yellow wristband and red socks/Bed in lowest position, wheels locked, appropriate side rails in place/Call bell, personal items and telephone in reach/Instruct patient to call for assistance before getting out of bed or chair/Non-slip footwear when patient is out of bed/[place] to call system/Physically safe environment - no spills, clutter or unnecessary equipment/Purposeful Proactive Rounding/Room/bathroom lighting operational, light cord in reach

## 2025-02-22 NOTE — H&P ADULT - IN ACCORDANCE WITH [place] STATE LAW, WE OFFER EVERY PATIENT A HEPATITIS C TEST. WOULD YOU LIKE TO BE TESTED TODAY?
[facility] Medicine  History & Physical    Patient Name: Alfredo Parisi  MRN: [id number]  Patient Class: OP- Observation  Admission Date: 1/14/2025  Attending Physician: Gage Yao MD   Primary Care Provider: Roderick Mcneil MD         Patient information was obtained from patient, spouse/SO, past medical records, and ER records.     Subjective:     Principal Problem:Paroxysmal atrial fibrillation    Chief Complaint:   Chief Complaint   Patient presents with    Irregular Heart Beat     Pt arrived in ED, due to being sent over from PCP because he was in Afib RVR. Pt denies having a hx of. Pt denies any CP, SOB, abd pain or n/v/d        HPI: 70-year-old  male with medical history significant for hypertension, hypothyroidism, class 2 obesity, and prediabetes who was sent to the emergency room by PCP after presenting for routine checkup/hypertension follow-up found to be in atrial fibrillation with rapid ventricular response, he voiced previous palpitation but no atrial fibrillation was captured on previous loop recorder per patient, at presentation today he denied chest pain, shortness of breath, orthopnea, paroxysmal nocturnal dyspnea or pedal swelling.  Voiced noticing his heart racing earlier in the day but has since improved, during my evaluation patient is out of atrial fibrillation, with heart rate in the upper 50s to low 60s.  He denied recent upper respiratory tract symptoms, no fever, chills or rigors, no GI symptoms or cough.  He denied urinary symptoms of dysuria, frequency, urgency, straining at micturition or hematuria.  Complained of cramps in his lower extremity and flanks, he voiced recurrent use of BC powder for generalized body aches but not in the past 2 days.  No previous cardiac history, no history of hyperlipidemia, no reported family history of sudden cardiac death.  He takes telmisartan 80 mg daily, and verapamil 80 mg b.i.d. but verapamil dose was changed  to 120 mg at his doctor's office visits today, yet to start this dose.  He was started on diltiazem in the emergency room and subsequently converted back to sinus rhythm.    Lab obtained in the emergency room showed CMP essentially within normal limits, magnesium 2.1, BNP 31, troponin I less than 0.006, CBC essentially within normal limits, free T4 1.04, TSH 4.803.    Chest x-ray showed no acute abnormality.    Past Medical History:   Diagnosis Date    Hypertension     Serous retinal detachment of left eye        Past Surgical History:   Procedure Laterality Date    CATARACT EXTRACTION      COLONOSCOPY N/A 04/29/2022    Procedure: COLONOSCOPY;  Surgeon: Tyra Quintanilla MD;  Location: [facility];  Service: Endoscopy;  Laterality: N/A;    EYE SURGERY  2/2011, 2/ 2011, 8/2011, 3/2017,    Detached retina lft eye,laser right eye, cateract lf eye, ca       Review of patient's allergies indicates:  No Known Allergies    No current facility-administered medications on file prior to encounter.     Current Outpatient Medications on File Prior to Encounter   Medication Sig    guaiFENesin (MUCINEX) 600 mg 12 hr tablet Take 2 tablets (1,200 mg total) by mouth 2 (two) times daily.    levothyroxine (SYNTHROID) 125 MCG tablet TAKE 1 TABLET BEFORE BREAKFAST DONT EAT FOR 45 MINUTES AFTER TAKING MEDICATION    telmisartan (MICARDIS) 80 MG Tab Take 1 tablet (80 mg total) by mouth once daily.    triamcinolone acetonide 0.1% (KENALOG) 0.1 % cream Apply topically 2 (two) times daily.    verapamiL (VERELAN) 120 MG C24P Take 1 capsule (120 mg total) by mouth once daily.    [DISCONTINUED] ergocalciferol (ERGOCALCIFEROL) 50,000 unit Cap Take 1 capsule (50,000 Units total) by mouth every 7 days.    [DISCONTINUED] levothyroxine (SYNTHROID) 125 MCG tablet TAKE 1 TABLET BEFORE BREAKFAST DONT EAT FOR 45 MINUTES AFTER TAKING MEDICATION    [DISCONTINUED] telmisartan (MICARDIS) 80 MG Tab Take 1 tablet (80 mg total) by mouth once daily.     [DISCONTINUED] triamcinolone acetonide 0.1% (KENALOG) 0.1 % cream Apply topically 2 (two) times daily. (Patient not taking: Reported on 1/14/2025)    [DISCONTINUED] verapamiL (CALAN) 40 MG Tab Take 1 tablet (40 mg total) by mouth 2 (two) times daily. HOLD FOR HEART RATE LESS THAN 50     Family History       Problem Relation (Age of Onset)    Alcohol abuse Father    Arthritis Mother    Cancer Father    Colon cancer Father (63)    Depression Mother    Hypertension Mother          Tobacco Use    Smoking status: Never    Smokeless tobacco: Never    Tobacco comments:     Mother and father smoked heavy   Substance and Sexual Activity    Alcohol use: Not Currently     Comment: Only drink occas, les than 1 drink per month    Drug use: Never    Sexual activity: Yes     Partners: Female     Birth control/protection: Post-menopausal     Review of Systems   Constitutional:  Negative for appetite change, chills, diaphoresis and fatigue.   HENT:  Negative for congestion, sore throat, tinnitus, trouble swallowing and voice change.    Eyes:  Negative for pain, discharge, redness and itching.   Respiratory:  Negative for cough, chest tightness, shortness of breath and wheezing.    Cardiovascular:  Positive for palpitations. Negative for chest pain and leg swelling.   Gastrointestinal:  Negative for abdominal distention, abdominal pain, blood in stool, nausea and vomiting.   Endocrine: Negative for cold intolerance, heat intolerance and polydipsia.   Genitourinary:  Negative for difficulty urinating, dysuria, flank pain, frequency and hematuria.   Musculoskeletal:  Negative for arthralgias and back pain.   Skin:  Negative for color change and pallor.   Neurological:  Negative for dizziness, tremors, seizures, facial asymmetry, light-headedness, numbness and headaches.   Psychiatric/Behavioral:  Negative for agitation, confusion and hallucinations.      Objective:     Vital Signs (Most Recent):  Temp: 97.8 °F (36.6 °C) (01/14/25  1551)  Pulse: (!) 52 (01/14/25 1910)  Resp: 19 (01/14/25 1910)  BP: (!) 143/73 (01/14/25 1910)  SpO2: 95 % (01/14/25 1910) Vital Signs (24h Range):  Temp:  [97.8 °F (36.6 °C)-98.2 °F (36.8 °C)] 97.8 °F (36.6 °C)  Pulse:  [] 52  Resp:  [15-20] 19  SpO2:  [95 %-98 %] 95 %  BP: (140-205)/() 143/73     Weight: 133.4 kg (294 lb)  Body mass index is 37.75 kg/m².     Physical Exam  Vitals reviewed.   Constitutional:       General: He is not in acute distress.     Appearance: Normal appearance. He is obese. He is not ill-appearing.   HENT:      Head: Normocephalic and atraumatic.      Nose: Nose normal. No congestion or rhinorrhea.   Eyes:      General: No scleral icterus.        Left eye: No discharge.      Extraocular Movements: Extraocular movements intact.      Conjunctiva/sclera: Conjunctivae normal.      Pupils: Pupils are equal, round, and reactive to light.   Cardiovascular:      Rate and Rhythm: Regular rhythm. Bradycardia present.      Pulses: Normal pulses.      Heart sounds: Normal heart sounds. No murmur heard.     No gallop.   Pulmonary:      Effort: Pulmonary effort is normal. No respiratory distress.      Breath sounds: Normal breath sounds. No stridor. No wheezing or rales.   Chest:      Chest wall: No tenderness.   Abdominal:      General: Abdomen is flat. Bowel sounds are normal. There is no distension.      Palpations: Abdomen is soft.      Tenderness: There is no abdominal tenderness. There is no right CVA tenderness, left CVA tenderness, guarding or rebound.   Musculoskeletal:         General: No swelling or tenderness. Normal range of motion.      Cervical back: Normal range of motion and neck supple. No rigidity or tenderness.      Right lower leg: No edema.      Left lower leg: No edema.   Skin:     General: Skin is warm and dry.      Coloration: Skin is not jaundiced or pale.      Findings: No bruising.   Neurological:      General: No focal deficit present.      Mental Status: He is  "alert and oriented to person, place, and time. Mental status is at baseline.      Cranial Nerves: No cranial nerve deficit.      Sensory: No sensory deficit.   Psychiatric:         Mood and Affect: Mood normal.         Behavior: Behavior normal.         Thought Content: Thought content normal.         Judgment: Judgment normal.              CRANIAL NERVES     CN III, IV, VI   Pupils are equal, round, and reactive to light.       Significant Labs: All pertinent labs within the past 24 hours have been reviewed.  A1C: No results for input(s): "HGBA1C" in the last 4320 hours.  CBC:   Recent Labs   Lab 01/14/25  1622   WBC 6.83   HGB 15.4   HCT 44.3        CMP:   Recent Labs   Lab 01/14/25  1703      K 4.0      CO2 24      BUN 18   CREATININE 0.8   CALCIUM 9.6   PROT 7.9   ALBUMIN 4.2   BILITOT 0.5   ALKPHOS 40   AST 27   ALT 37   ANIONGAP 8     Cardiac Markers:   Recent Labs   Lab 01/14/25  1703   BNP 31     Magnesium:   Recent Labs   Lab 01/14/25  1703   MG 2.1     Troponin:   Recent Labs   Lab 01/14/25  1703   TROPONINI <0.006     TSH:   Recent Labs   Lab 01/14/25  1622   TSH 4.803*     Significant Imaging: I have reviewed all pertinent imaging results/findings within the past 24 hours.  Imaging Results              X-Ray Chest AP Portable (Final result)  Result time 01/14/25 16:26:26      Final result by Marcos Lema MD (01/14/25 16:26:26)                   Impression:      No acute abnormality.      Electronically signed by: Marcos Lema MD  Date:    01/14/2025  Time:    16:26               Narrative:    EXAMINATION:  XR CHEST AP PORTABLE    CLINICAL HISTORY:  atrial fibrillation;    TECHNIQUE:  Single views of the chest were performed.    COMPARISON:  Chest radiograph dated October 3, 2019    FINDINGS:  The trachea and cardiomediastinal silhouette are within normal limits.  There is no evidence of pleural effusions, pneumothoraces or consolidations.  Lungs are clear.  Osseous structures " demonstrate no evidence for acute fractures or dislocations.                                      Assessment/Plan:     * Paroxysmal atrial fibrillation  Patient has paroxysmal (<7 days) atrial fibrillation. Patient is currently in sinus rhythm. [id number] Score: 2. The patients heart rate in the last 24 hours is as follows:  Pulse  Min: 52  Max: 165     Antiarrhythmics   Started on diltiazem in the emergency room, now rate controlled.    Anticoagulants  apixaban tablet 5 mg, 2 times daily, Oral    Plan  - Replete lytes with a goal of K>4, Mg >2  - electrolytes within acceptable limits  - Patient is anticoagulated, see medications listed above.  - he got full dose of Lovenox in the emergency room, will start apixaban  - cardiology consulted prior to initiating apixaban  - Patient's afib is currently controlled  - will obtain 2D echo    Class 2 severe obesity due to excess calories with serious comorbidity and body mass index (BMI) of 39.0 to 39.9 in adult  Body mass index is 37.75 kg/m².   Morbid obesity complicates all aspects of disease management from diagnostic modalities to treatment.   Weight loss encouraged and health benefits explained to patient.   Previously lost about 50 lb, but recently re-gained 30 lb back due to sedentary lifestyle  Discuss lifestyle modification, healthy diet.        Acquired hypothyroidism  He is clinically euthyroid  TSH elevated at 4.803, however free T4 within normal limits  Continue home dose of levothyroxine at 125 mcg.      Prediabetes  History of prediabetes  We will obtain hemoglobin A1c at this time   Discuss lifestyle modification      Benign essential HTN  Patient's blood pressure range in the last 24 hours was: BP  Min: 140/100  Max: 205/127.The patient's inpatient anti-hypertensive regimen is listed below:  Current Antihypertensives  losartan tablet 100 mg, Daily, Oral  verapamiL CR tablet 120 mg, Daily, Oral    Plan  - BP is uncontrolled, will adjust as follows:    -  will restart home antihypertensive at this time  -will adjust antihypertensive as clinically indicated      VTE Risk Mitigation (From admission, onward)           Ordered     apixaban tablet 5 mg  2 times daily         01/14/25 1934     Reason for No Pharmacological VTE Prophylaxis  Once        Question:  Reasons:  Answer:  Already adequately anticoagulated on oral Anticoagulants    01/14/25 1932     IP VTE HIGH RISK PATIENT  Once         01/14/25 1932     Place sequential compression device  Until discontinued         01/14/25 1932                       On 01/14/2025, patient should be placed in hospital observation services under my care.             Ariel Sanchez MD  Department of Hospital Medicine  [facility] [place] [facility] [place] Dept           Opt out

## 2025-02-22 NOTE — H&P ADULT - NSHPLABSRESULTS_GEN_ALL_CORE
13.7   7.01  )-----------( 170      ( 22 Feb 2025 15:50 )             41.2     02-22    141  |  102  |  32.2[H]  ----------------------------<  180[H]  5.2   |  25.0  |  1.81[H]    Ca    9.1      22 Feb 2025 15:50  Mg     2.3     02-22    TPro  6.9  /  Alb  4.0  /  TBili  0.8  /  DBili  x   /  AST  19  /  ALT  22  /  AlkPhos  113  02-22    Troponin 37  proBNP 232    Resp Viral Panel Negative  Glycohemoglobin 11.6, average glucose 286    TSH 7.35  T3 110  T4 9.2    VBG   pH, Venous: 7.260  pCO2, Venous: 57 mmHg  pO2, Venous: 74 mmHg  HCO3, Venous: 26 mmol/L  Base Excess, Venous: -1.5 mmol/L  Oxygen Saturation, Venous: 95.3 %  Blood Gas Source Venous: Venous  Lactate on VBG 2.3    Last echocardiogram from November 2019  1. Technically good study.   2. Normal global left ventricular systolic function.   3. Left ventricular ejection fraction, by visual estimation, is 60 to 65%.   4. Spectral Doppler shows impaired relaxation pattern of left ventricular myocardial filling (Grade I diastolic dysfunction).   5. There is no evidence of pericardial effusion.   6. No significant valvular heart disease.    CXR pending for TVP placement

## 2025-02-22 NOTE — ED ADULT NURSE REASSESSMENT NOTE - NS ED NURSE REASSESS COMMENT FT1
Assumed pt care from Derek MORE. Pt HR low in the 20s. Pt moved top critical care 5. Pt placed on monitor and pads. IV placed and blood sent to lab. Awaiting further orders. Pt denies CP, SOB but endorsing dizziness. MD Redman at bedside for assessment.

## 2025-02-22 NOTE — PATIENT PROFILE ADULT - TOBACCO USE
LABS:                        9.6    18.0  )-----------( 476      ( 13 Dec 2018 10:15 )             29.8     12-    138  |  100  |  12  ----------------------------<  110<H>  4.0   |  28  |  0.71    Ca    9.4      13 Dec 2018 10:15    TPro  6.6  /  Alb  3.5  /  TBili  0.3  /  DBili  x   /  AST  20  /  ALT  24  /  AlkPhos  114  12-13    PT/INR - ( 13 Dec 2018 10:15 )   PT: 12.7 sec;   INR: 1.12          PTT - ( 13 Dec 2018 10:15 )  PTT:29.7 sec  Urinalysis Basic - ( 13 Dec 2018 09:12 )    Color: Yellow / Appearance: Clear / S.010 / pH: x  Gluc: x / Ketone: NEGATIVE  / Bili: Negative / Urobili: 0.2 E.U./dL   Blood: x / Protein: NEGATIVE mg/dL / Nitrite: NEGATIVE   Leuk Esterase: NEGATIVE / RBC: x / WBC x   Sq Epi: x / Non Sq Epi: x / Bacteria: x        RADIOLOGY & ADDITIONAL STUDIES:  ABDOMEN:  LIVER: Within normal limits  BILE DUCTS: Normal caliber  GALLBLADDER: No calcified gallstones. Normal caliber wall  PANCREAS: Within normal limits  SPLEEN: Within normal limits  ADRENALS: Within normal limits  KIDNEYS: Within normal limits    PELVIS:  REPRODUCTIVE ORGANS: No pelvic masses  BLADDER: Within normal limits    PERITONEUM:No ascites, no free air.  BOWEL: Sigmoid diverticulitis. Multiloculated pericolic abscess extending   into the posterior cul-de-sac anterior to the colon and posterior to the   vaginal cuff/bladder. It measures 7.5 x 4.5 x 6 cm.  VESSELS: Extensively atherosclerotic.  RETROPERITONEUM: No retroperitoneal or pelvic adenopathy  ABDOMINAL WALL: Within normal limits  MUSCULOSKELETAL: Within normal limits    IMPRESSION:     Sigmoid diverticulitis with 7-8 cm multiloculated deep pelvic abscess as   described.
Former smoker

## 2025-02-22 NOTE — H&P ADULT - NSHPPHYSICALEXAM_GEN_ALL_CORE
T(C): 36.3 (02-22-25 @ 15:13), Max: 36.3 (02-22-25 @ 15:13)  HR: 38 (02-22-25 @ 17:11) (28 - 43)  BP: 145/58 (02-22-25 @ 17:11) (134/64 - 187/76)  RR: 19 (02-22-25 @ 17:11) (18 - 19)  SpO2: 100% (02-22-25 @ 17:11) (99% - 100%)    CONSTITUTIONAL: Tired appearing but alert, pleasant, cooperative, Well groomed, no apparent distress  EYES: PERRLA and symmetric, EOMI, No conjunctival or scleral injection, non-icteric  ENMT: Oral mucosa with moist membranes. Normal dentition; no pharyngeal injection or exudates             NECK: Supple, symmetric and without tracheal deviation   RESP: No respiratory distress, no use of accessory muscles; CTA b/l, no WRR  CV: Maximilian to 20-30s, no MRG; no JVD; no peripheral edema  GI: Soft, NT, ND, no rebound, no guarding; no palpable masses; no hepatosplenomegaly; no hernia palpated  LYMPH: No cervical LAD or tenderness; no axillary LAD or tenderness; no inguinal LAD or tenderness  MSK:  No digital clubbing or cyanosis; examination of the (head/neck/spine/ribs/pelvis, RUE, LUE, RLE, LLE) without misalignment,            Normal ROM without pain, no spinal tenderness, normal muscle strength/tone  SKIN: No rashes or ulcers noted; no subcutaneous nodules or induration palpable  NEURO: CN II-XII intact; normal reflexes in upper and lower extremities, sensation intact in upper and lower extremities b/l to light touch   PSYCH: Appropriate insight/judgment; A+O x 3, mood and affect appropriate, recent/remote memory intact

## 2025-02-23 LAB
ALBUMIN SERPL ELPH-MCNC: 3.5 G/DL — SIGNIFICANT CHANGE UP (ref 3.3–5.2)
ALP SERPL-CCNC: 90 U/L — SIGNIFICANT CHANGE UP (ref 40–120)
ALT FLD-CCNC: 19 U/L — SIGNIFICANT CHANGE UP
ANION GAP SERPL CALC-SCNC: 12 MMOL/L — SIGNIFICANT CHANGE UP (ref 5–17)
AST SERPL-CCNC: 20 U/L — SIGNIFICANT CHANGE UP
BASOPHILS # BLD AUTO: 0.02 K/UL — SIGNIFICANT CHANGE UP (ref 0–0.2)
BASOPHILS NFR BLD AUTO: 0.3 % — SIGNIFICANT CHANGE UP (ref 0–2)
BILIRUB SERPL-MCNC: 0.7 MG/DL — SIGNIFICANT CHANGE UP (ref 0.4–2)
BUN SERPL-MCNC: 29.5 MG/DL — HIGH (ref 8–20)
CALCIUM SERPL-MCNC: 8.2 MG/DL — LOW (ref 8.4–10.5)
CHLORIDE SERPL-SCNC: 105 MMOL/L — SIGNIFICANT CHANGE UP (ref 96–108)
CO2 SERPL-SCNC: 23 MMOL/L — SIGNIFICANT CHANGE UP (ref 22–29)
CREAT SERPL-MCNC: 1.58 MG/DL — HIGH (ref 0.5–1.3)
EGFR: 46 ML/MIN/1.73M2 — LOW
EOSINOPHIL # BLD AUTO: 0.11 K/UL — SIGNIFICANT CHANGE UP (ref 0–0.5)
EOSINOPHIL NFR BLD AUTO: 1.4 % — SIGNIFICANT CHANGE UP (ref 0–6)
GLUCOSE BLDC GLUCOMTR-MCNC: 131 MG/DL — HIGH (ref 70–99)
GLUCOSE BLDC GLUCOMTR-MCNC: 132 MG/DL — HIGH (ref 70–99)
GLUCOSE BLDC GLUCOMTR-MCNC: 168 MG/DL — HIGH (ref 70–99)
GLUCOSE BLDC GLUCOMTR-MCNC: 99 MG/DL — SIGNIFICANT CHANGE UP (ref 70–99)
GLUCOSE SERPL-MCNC: 111 MG/DL — HIGH (ref 70–99)
HCT VFR BLD CALC: 38.2 % — LOW (ref 39–50)
HGB BLD-MCNC: 13.2 G/DL — SIGNIFICANT CHANGE UP (ref 13–17)
IMM GRANULOCYTES # BLD AUTO: 0.02 K/UL — SIGNIFICANT CHANGE UP (ref 0–0.07)
IMM GRANULOCYTES NFR BLD AUTO: 0.3 % — SIGNIFICANT CHANGE UP (ref 0–0.9)
LYMPHOCYTES # BLD AUTO: 1.99 K/UL — SIGNIFICANT CHANGE UP (ref 1–3.3)
LYMPHOCYTES NFR BLD AUTO: 25.4 % — SIGNIFICANT CHANGE UP (ref 13–44)
MAGNESIUM SERPL-MCNC: 2.3 MG/DL — SIGNIFICANT CHANGE UP (ref 1.6–2.6)
MCHC RBC-ENTMCNC: 29.3 PG — SIGNIFICANT CHANGE UP (ref 27–34)
MCHC RBC-ENTMCNC: 34.6 G/DL — SIGNIFICANT CHANGE UP (ref 32–36)
MCV RBC AUTO: 84.7 FL — SIGNIFICANT CHANGE UP (ref 80–100)
MONOCYTES # BLD AUTO: 0.49 K/UL — SIGNIFICANT CHANGE UP (ref 0–0.9)
MONOCYTES NFR BLD AUTO: 6.3 % — SIGNIFICANT CHANGE UP (ref 2–14)
MRSA PCR RESULT.: SIGNIFICANT CHANGE UP
NEUTROPHILS # BLD AUTO: 5.19 K/UL — SIGNIFICANT CHANGE UP (ref 1.8–7.4)
NEUTROPHILS NFR BLD AUTO: 66.3 % — SIGNIFICANT CHANGE UP (ref 43–77)
NRBC # BLD AUTO: 0 K/UL — SIGNIFICANT CHANGE UP (ref 0–0)
NRBC # FLD: 0 K/UL — SIGNIFICANT CHANGE UP (ref 0–0)
NRBC BLD AUTO-RTO: 0 /100 WBCS — SIGNIFICANT CHANGE UP (ref 0–0)
PHOSPHATE SERPL-MCNC: 3.5 MG/DL — SIGNIFICANT CHANGE UP (ref 2.4–4.7)
PLATELET # BLD AUTO: 161 K/UL — SIGNIFICANT CHANGE UP (ref 150–400)
PMV BLD: 11.1 FL — SIGNIFICANT CHANGE UP (ref 7–13)
POTASSIUM SERPL-MCNC: 4.4 MMOL/L — SIGNIFICANT CHANGE UP (ref 3.5–5.3)
POTASSIUM SERPL-SCNC: 4.4 MMOL/L — SIGNIFICANT CHANGE UP (ref 3.5–5.3)
PROT SERPL-MCNC: 5.8 G/DL — LOW (ref 6.6–8.7)
PSA FLD-MCNC: 25 NG/ML — HIGH (ref 0–4)
RBC # BLD: 4.51 M/UL — SIGNIFICANT CHANGE UP (ref 4.2–5.8)
RBC # FLD: 12.4 % — SIGNIFICANT CHANGE UP (ref 10.3–14.5)
S AUREUS DNA NOSE QL NAA+PROBE: SIGNIFICANT CHANGE UP
SODIUM SERPL-SCNC: 140 MMOL/L — SIGNIFICANT CHANGE UP (ref 135–145)
WBC # BLD: 7.82 K/UL — SIGNIFICANT CHANGE UP (ref 3.8–10.5)
WBC # FLD AUTO: 7.82 K/UL — SIGNIFICANT CHANGE UP (ref 3.8–10.5)

## 2025-02-23 PROCEDURE — 99232 SBSQ HOSP IP/OBS MODERATE 35: CPT

## 2025-02-23 PROCEDURE — 71045 X-RAY EXAM CHEST 1 VIEW: CPT | Mod: 26

## 2025-02-23 PROCEDURE — 99233 SBSQ HOSP IP/OBS HIGH 50: CPT | Mod: GC

## 2025-02-23 RX ADMIN — INSULIN GLARGINE-YFGN 15 UNIT(S): 100 INJECTION, SOLUTION SUBCUTANEOUS at 22:13

## 2025-02-23 RX ADMIN — Medication 1 APPLICATION(S): at 05:13

## 2025-02-23 RX ADMIN — CLOPIDOGREL BISULFATE 75 MILLIGRAM(S): 75 TABLET, FILM COATED ORAL at 12:36

## 2025-02-23 RX ADMIN — HEPARIN SODIUM 5000 UNIT(S): 1000 INJECTION INTRAVENOUS; SUBCUTANEOUS at 22:13

## 2025-02-23 RX ADMIN — HEPARIN SODIUM 5000 UNIT(S): 1000 INJECTION INTRAVENOUS; SUBCUTANEOUS at 05:13

## 2025-02-23 RX ADMIN — HEPARIN SODIUM 5000 UNIT(S): 1000 INJECTION INTRAVENOUS; SUBCUTANEOUS at 15:06

## 2025-02-23 RX ADMIN — ATORVASTATIN CALCIUM 40 MILLIGRAM(S): 80 TABLET, FILM COATED ORAL at 22:14

## 2025-02-23 RX ADMIN — Medication 40 MILLIGRAM(S): at 12:36

## 2025-02-23 NOTE — PROGRESS NOTE ADULT - SUBJECTIVE AND OBJECTIVE BOX
Patient is a 74y old  Male who presents with a chief complaint of Complete Heart Block (22 Feb 2025 17:37)    VINOD YUNG  [id number]  Patient is a 74y old  Male who presents with a chief complaint of Complete Heart Block (22 Feb 2025 17:37)      BRIEF HOSPITAL COURSE  HPI:  73yo M with PMH: CAD s/p stent x2 in Oct 2019, DM-poorly controlled, last A1C 12 per patient, HTN, HLD, s/p cholecystectomy, s/p bilat cataracts removed.  Smoker 4-5 cigarettes daily, elevated PSA states told elevated 18 months ago, hasn't followed up. Urinates 3-4 times a night  Presented from home for syncopal events lasting a few seconds. Family called cardiologist who advised going to ED. Patient states that he has felt weak/lightheaded for last several days and had a similar syncopal event about a month ago.  In ED patient in/out of consciousness initially, diaphoretic/dizzy, did have another short syncopal episode in ED  Found to have complete heart block on ECG, rate 20-30s,   EP consulted who advised for Transvenous Pacer placement and admission to ICU with eventual PPM placement  Started Dopamine infusion to augment heart rate (22 Feb 2025 17:37)      Events last 24 hours: ***    PAST MEDICAL & SURGICAL HISTORY:  DM (diabetes mellitus)      HLD (hyperlipidemia)      CAD (coronary artery disease)      HTN (hypertension)      BPH with elevated PSA      Bilateral cataracts      Gall bladder stones      H/O cardiac catheterization      S/P cholecystectomy      Bilateral cataracts          Review of Systems:  CONSTITUTIONAL: No fever, chills, or fatigue  EYES: No eye pain, visual disturbances, or discharge  ENMT:  No difficulty hearing, tinnitus, vertigo; No sinus or throat pain  NECK: No pain or stiffness  RESPIRATORY: No cough, wheezing, chills or hemoptysis; No shortness of breath  CARDIOVASCULAR: No chest pain, palpitations, dizziness, or leg swelling  GASTROINTESTINAL: No abdominal or epigastric pain. No nausea, vomiting, or hematemesis; No diarrhea or constipation. No melena or hematochezia.  GENITOURINARY: No dysuria, frequency, hematuria, or incontinence  NEUROLOGICAL: No headaches, memory loss, loss of strength, numbness, or tremors  SKIN: No itching, burning, rashes, or lesions   MUSCULOSKELETAL: No joint pain or swelling; No muscle, back, or extremity pain  PSYCHIATRIC: No depression, anxiety, mood swings, or difficulty sleeping      Medications:    DOPamine Infusion 2 MICROgram(s)/kG/Min IV Continuous <Continuous>      ondansetron Injectable 4 milliGRAM(s) IV Push once      clopidogrel Tablet 75 milliGRAM(s) Oral daily  heparin   Injectable 5000 Unit(s) SubCutaneous every 8 hours    pantoprazole  Injectable 40 milliGRAM(s) IV Push daily      atorvastatin 40 milliGRAM(s) Oral at bedtime  dextrose 50% Injectable 25 Gram(s) IV Push once  dextrose Oral Gel 15 Gram(s) Oral once PRN  glucagon  Injectable 1 milliGRAM(s) IntraMuscular once  insulin glargine Injectable (LANTUS) 15 Unit(s) SubCutaneous at bedtime  insulin lispro (ADMELOG) corrective regimen sliding scale   SubCutaneous three times a day before meals    dextrose 5%. 1000 milliLiter(s) IV Continuous <Continuous>    influenza  Vaccine (HIGH DOSE) 0.5 milliLiter(s) IntraMuscular once    chlorhexidine 2% Cloths 1 Application(s) Topical <User Schedule>            ICU Vital Signs Last 24 Hrs  T(C): 36.6 (23 Feb 2025 07:22), Max: 36.7 (22 Feb 2025 22:18)  T(F): 97.8 (23 Feb 2025 07:22), Max: 98.1 (22 Feb 2025 22:18)  HR: 80 (23 Feb 2025 07:00) (28 - 85)  BP: 101/70 (23 Feb 2025 07:00) (90/58 - 187/76)  BP(mean): 80 (23 Feb 2025 07:00) (70 - 108)  ABP: --  ABP(mean): --  RR: 15 (23 Feb 2025 05:00) (12 - 19)  SpO2: 97% (23 Feb 2025 07:00) (96% - 100%)    O2 Parameters below as of 23 Feb 2025 04:00  Patient On (Oxygen Delivery Method): room air                I&O's Detail    22 Feb 2025 07:01  -  23 Feb 2025 07:00  --------------------------------------------------------  IN:  Total IN: 0 mL    OUT:    Voided (mL): 700 mL  Total OUT: 700 mL    Total NET: -700 mL            LABS:                        13.2   7.82  )-----------( 161      ( 23 Feb 2025 04:34 )             38.2     02-23    140  |  105  |  29.5[H]  ----------------------------<  111[H]  4.4   |  23.0  |  1.58[H]    Ca    8.2[L]      23 Feb 2025 04:34  Phos  3.5     02-23  Mg     2.3     02-23    TPro  5.8[L]  /  Alb  3.5  /  TBili  0.7  /  DBili  x   /  AST  20  /  ALT  19  /  AlkPhos  90  02-23          CAPILLARY BLOOD GLUCOSE      POCT Blood Glucose.: 99 mg/dL (23 Feb 2025 05:11)      Urinalysis Basic - ( 23 Feb 2025 04:34 )    Color: x / Appearance: x / SG: x / pH: x  Gluc: 111 mg/dL / Ketone: x  / Bili: x / Urobili: x   Blood: x / Protein: x / Nitrite: x   Leuk Esterase: x / RBC: x / WBC x   Sq Epi: x / Non Sq Epi: x / Bacteria: x      CULTURES:      Physical Examination:    General: No acute distress.      HEENT: Pupils equal, reactive to light.  Symmetric.    PULM: Clear to auscultation bilaterally, no significant sputum production    NECK: Supple, no lymphadenopathy, trachea midline    CVS: Regular rate and rhythm, no murmurs, rubs, or gallops    ABD: Soft, nondistended, nontender, normoactive bowel sounds, no masses    EXT: No edema, nontender    SKIN: Warm and well perfused, no rashes noted.    NEURO: Alert, oriented, interactive, nonfocal     VINOD YUNG  [id number]  Patient is a 74y old  Male who presents with a chief complaint of Complete Heart Block (22 Feb 2025 17:37)      BRIEF HOSPITAL COURSE    73 y/o M with PMHx, CAD s/p stent x2 in Oct 2019, IDDM (Last A1C, 12), HTN, HLD, s/p deepti, active tobacco use disorder, who presented to [facility] from home after a syncopal event reported to have lasted only a few seconds. Patient was advised by his cardiologist to present to ED. On arrival, patient reporting weakness and lightheadedness for several days PTA. Patient was diaphoretic and dizzy in ED, with eventual second brief syncopal event. ECG revealed CHB with HR between 20-30 BPM. EP consulted, recommended TVP which was successfuly performed in ED. Patient subsequently admitted to MICU and started on dobutamine. Plan is for PPM placement on 02/24.    Events last 24 hours: ***    PAST MEDICAL & SURGICAL HISTORY:  DM (diabetes mellitus)      HLD (hyperlipidemia)      CAD (coronary artery disease)      HTN (hypertension)      BPH with elevated PSA      Bilateral cataracts      Gall bladder stones      H/O cardiac catheterization      S/P cholecystectomy      Bilateral cataracts          Review of Systems:  CONSTITUTIONAL: No fever, chills, or fatigue  EYES: No eye pain, visual disturbances, or discharge  ENMT:  No difficulty hearing, tinnitus, vertigo; No sinus or throat pain  NECK: No pain or stiffness  RESPIRATORY: No cough, wheezing, chills or hemoptysis; No shortness of breath  CARDIOVASCULAR: No chest pain, palpitations, dizziness, or leg swelling  GASTROINTESTINAL: No abdominal or epigastric pain. No nausea, vomiting, or hematemesis; No diarrhea or constipation. No melena or hematochezia.  GENITOURINARY: No dysuria, frequency, hematuria, or incontinence  NEUROLOGICAL: No headaches, memory loss, loss of strength, numbness, or tremors  SKIN: No itching, burning, rashes, or lesions   MUSCULOSKELETAL: No joint pain or swelling; No muscle, back, or extremity pain  PSYCHIATRIC: No depression, anxiety, mood swings, or difficulty sleeping      Medications:    DOPamine Infusion 2 MICROgram(s)/kG/Min IV Continuous <Continuous>      ondansetron Injectable 4 milliGRAM(s) IV Push once      clopidogrel Tablet 75 milliGRAM(s) Oral daily  heparin   Injectable 5000 Unit(s) SubCutaneous every 8 hours    pantoprazole  Injectable 40 milliGRAM(s) IV Push daily      atorvastatin 40 milliGRAM(s) Oral at bedtime  dextrose 50% Injectable 25 Gram(s) IV Push once  dextrose Oral Gel 15 Gram(s) Oral once PRN  glucagon  Injectable 1 milliGRAM(s) IntraMuscular once  insulin glargine Injectable (LANTUS) 15 Unit(s) SubCutaneous at bedtime  insulin lispro (ADMELOG) corrective regimen sliding scale   SubCutaneous three times a day before meals    dextrose 5%. 1000 milliLiter(s) IV Continuous <Continuous>    influenza  Vaccine (HIGH DOSE) 0.5 milliLiter(s) IntraMuscular once    chlorhexidine 2% Cloths 1 Application(s) Topical <User Schedule>            ICU Vital Signs Last 24 Hrs  T(C): 36.6 (23 Feb 2025 07:22), Max: 36.7 (22 Feb 2025 22:18)  T(F): 97.8 (23 Feb 2025 07:22), Max: 98.1 (22 Feb 2025 22:18)  HR: 80 (23 Feb 2025 07:00) (28 - 85)  BP: 101/70 (23 Feb 2025 07:00) (90/58 - 187/76)  BP(mean): 80 (23 Feb 2025 07:00) (70 - 108)  ABP: --  ABP(mean): --  RR: 15 (23 Feb 2025 05:00) (12 - 19)  SpO2: 97% (23 Feb 2025 07:00) (96% - 100%)    O2 Parameters below as of 23 Feb 2025 04:00  Patient On (Oxygen Delivery Method): room air                I&O's Detail    22 Feb 2025 07:01  -  23 Feb 2025 07:00  --------------------------------------------------------  IN:  Total IN: 0 mL    OUT:    Voided (mL): 700 mL  Total OUT: 700 mL    Total NET: -700 mL            LABS:                        13.2   7.82  )-----------( 161      ( 23 Feb 2025 04:34 )             38.2     02-23    140  |  105  |  29.5[H]  ----------------------------<  111[H]  4.4   |  23.0  |  1.58[H]    Ca    8.2[L]      23 Feb 2025 04:34  Phos  3.5     02-23  Mg     2.3     02-23    TPro  5.8[L]  /  Alb  3.5  /  TBili  0.7  /  DBili  x   /  AST  20  /  ALT  19  /  AlkPhos  90  02-23          CAPILLARY BLOOD GLUCOSE      POCT Blood Glucose.: 99 mg/dL (23 Feb 2025 05:11)      Urinalysis Basic - ( 23 Feb 2025 04:34 )    Color: x / Appearance: x / SG: x / pH: x  Gluc: 111 mg/dL / Ketone: x  / Bili: x / Urobili: x   Blood: x / Protein: x / Nitrite: x   Leuk Esterase: x / RBC: x / WBC x   Sq Epi: x / Non Sq Epi: x / Bacteria: x      CULTURES:      Physical Examination:    General: No acute distress.      HEENT: Pupils equal, reactive to light.  Symmetric.    PULM: Clear to auscultation bilaterally, no significant sputum production    NECK: Supple, no lymphadenopathy, trachea midline    CVS: Regular rate and rhythm, no murmurs, rubs, or gallops    ABD: Soft, nondistended, nontender, normoactive bowel sounds, no masses    EXT: No edema, nontender    SKIN: Warm and well perfused, no rashes noted.    NEURO: Alert, oriented, interactive, nonfocal     VINOD YUNG  MRN-[id number]  Patient is a 74y old  Male who presents with a chief complaint of Complete Heart Block (22 Feb 2025 17:37)      BRIEF HOSPITAL COURSE    75 y/o M with PMHx, CAD s/p stent x2 in Oct 2019, IDDM (Last A1C, 12), HTN, HLD, s/p deepti, active tobacco use disorder, who presented to [facility] from home after a syncopal event reported to have lasted only a few seconds. Patient was advised by his cardiologist to present to ED. On arrival, patient reporting weakness and lightheadedness for several days PTA. Patient was diaphoretic and dizzy in ED, with eventual second brief syncopal event. ECG revealed CHB with HR between 20-30 BPM. EP consulted, recommended TVP which was successfuly performed in ED. Patient subsequently admitted to MICU and started on dobutamine. Plan is for PPM placement on 02/24.    Events last 24 hours:   - Patient seen and assessed during morning MICU rounds  - Offered no acute complaints, other than hunger  - Diet resumed as procedure planned for monday 02/24, NPO AFTER MIDNIGHT 02/23  - denies CP, or hx of tick exposure/camping    PAST MEDICAL & SURGICAL HISTORY:  DM (diabetes mellitus)      HLD (hyperlipidemia)      CAD (coronary artery disease)      HTN (hypertension)      BPH with elevated PSA      Bilateral cataracts      Gall bladder stones      H/O cardiac catheterization      S/P cholecystectomy      Bilateral cataracts          Review of Systems:  CONSTITUTIONAL: No fever, chills, or fatigue  EYES: No eye pain, visual disturbances, or discharge  ENMT:  No difficulty hearing, tinnitus, vertigo; No sinus or throat pain  NECK: No pain or stiffness  RESPIRATORY: No cough, wheezing, chills or hemoptysis; No shortness of breath  CARDIOVASCULAR: No chest pain, palpitations, dizziness, or leg swelling  GASTROINTESTINAL: No abdominal or epigastric pain. No nausea, vomiting, or hematemesis; No diarrhea or constipation. No melena or hematochezia.  GENITOURINARY: No dysuria, frequency, hematuria, or incontinence  NEUROLOGICAL: No headaches, memory loss, loss of strength, numbness, or tremors  SKIN: No itching, burning, rashes, or lesions   MUSCULOSKELETAL: No joint pain or swelling; No muscle, back, or extremity pain  PSYCHIATRIC: No depression, anxiety, mood swings, or difficulty sleeping      Medications:    DOPamine Infusion 2 MICROgram(s)/kG/Min IV Continuous <Continuous>      ondansetron Injectable 4 milliGRAM(s) IV Push once      clopidogrel Tablet 75 milliGRAM(s) Oral daily  heparin   Injectable 5000 Unit(s) SubCutaneous every 8 hours    pantoprazole  Injectable 40 milliGRAM(s) IV Push daily      atorvastatin 40 milliGRAM(s) Oral at bedtime  dextrose 50% Injectable 25 Gram(s) IV Push once  dextrose Oral Gel 15 Gram(s) Oral once PRN  glucagon  Injectable 1 milliGRAM(s) IntraMuscular once  insulin glargine Injectable (LANTUS) 15 Unit(s) SubCutaneous at bedtime  insulin lispro (ADMELOG) corrective regimen sliding scale   SubCutaneous three times a day before meals    dextrose 5%. 1000 milliLiter(s) IV Continuous <Continuous>    influenza  Vaccine (HIGH DOSE) 0.5 milliLiter(s) IntraMuscular once    chlorhexidine 2% Cloths 1 Application(s) Topical <User Schedule>            ICU Vital Signs Last 24 Hrs  T(C): 36.6 (23 Feb 2025 07:22), Max: 36.7 (22 Feb 2025 22:18)  T(F): 97.8 (23 Feb 2025 07:22), Max: 98.1 (22 Feb 2025 22:18)  HR: 80 (23 Feb 2025 07:00) (28 - 85)  BP: 101/70 (23 Feb 2025 07:00) (90/58 - 187/76)  BP(mean): 80 (23 Feb 2025 07:00) (70 - 108)  ABP: --  ABP(mean): --  RR: 15 (23 Feb 2025 05:00) (12 - 19)  SpO2: 97% (23 Feb 2025 07:00) (96% - 100%)    O2 Parameters below as of 23 Feb 2025 04:00  Patient On (Oxygen Delivery Method): room air                I&O's Detail    22 Feb 2025 07:01  -  23 Feb 2025 07:00  --------------------------------------------------------  IN:  Total IN: 0 mL    OUT:    Voided (mL): 700 mL  Total OUT: 700 mL    Total NET: -700 mL            LABS:                        13.2   7.82  )-----------( 161      ( 23 Feb 2025 04:34 )             38.2     02-23    140  |  105  |  29.5[H]  ----------------------------<  111[H]  4.4   |  23.0  |  1.58[H]    Ca    8.2[L]      23 Feb 2025 04:34  Phos  3.5     02-23  Mg     2.3     02-23    TPro  5.8[L]  /  Alb  3.5  /  TBili  0.7  /  DBili  x   /  AST  20  /  ALT  19  /  AlkPhos  90  02-23          CAPILLARY BLOOD GLUCOSE      POCT Blood Glucose.: 99 mg/dL (23 Feb 2025 05:11)      Urinalysis Basic - ( 23 Feb 2025 04:34 )    Color: x / Appearance: x / SG: x / pH: x  Gluc: 111 mg/dL / Ketone: x  / Bili: x / Urobili: x   Blood: x / Protein: x / Nitrite: x   Leuk Esterase: x / RBC: x / WBC x   Sq Epi: x / Non Sq Epi: x / Bacteria: x      CULTURES:      Physical Examination:    General: No acute distress.      HEENT: Pupils equal, reactive to light.  Symmetric.    PULM: Clear to auscultation bilaterally, no significant sputum production    NECK: Supple, no lymphadenopathy, trachea midline    CVS: Regular rate and rhythm, no murmurs, rubs, or gallops    ABD: Soft, nondistended, nontender, normoactive bowel sounds, no masses    EXT: No edema, nontender    SKIN: Warm and well perfused, no rashes noted.    NEURO: Alert, oriented, interactive, nonfocal    LINES: TVP in [facility]

## 2025-02-23 NOTE — PROGRESS NOTE ADULT - ASSESSMENT
ASSESSMENT  VINOD YUNG is a 74y Male    PLAN:  #ProblemList    Neuro:  CVS:  - Maintain MAP>65  - Cardiac monitor  Resp:  - Sating well on RA, continue to monitor on   - Aspiration precautions  GI:  - Protonix for GI ppx  Renal:  ID:  Heme:  Endo:    Code Status: Full Code  Dispo: Admit to MICU    Case discussed with MICU Attending:     ASSESSMENT  VINOD YUNG is a 75 y/o M with PMHx, CAD s/p stent x2 in Oct 2019, IDDM (Last A1C, 12), HTN, HLD, s/p deepti, active tobacco use disorder admitted to [facility] MICU for:      PLAN:  #ProblemList    #Recurrent syncope with findings of Complete heart block, s/p TVP placement with good capture  # Poorly controlled diabetes with A1C 11. 6  # CAD s/p stent x2, current smoker  #Elevated PSA by report with frequent urination, concern for possible Prostate Cancer vs just BPH  # tobacco use disorder  #ANSHU    Neuro:  AOx4, no acute needs    CVS:  -TVP in place with good capture, confirmed during rounds this AM  - Avoid AV yony blocking agents  - PPM placement planned for 02/24,NPO AFTER MIDNIGHT 02/23  - Maintain MAP>65  - Cardiac monitor    Resp:  - Sating well on RA, continue to monitor on   - Aspiration precautions    GI:  - Protonix for GI ppx  - resumed diet today, will need to be NPO AFTER MIDNIGHT 02/23 for procedure    Renal:  - ANSHU noted on labs, jad prerenal in setting of CHB  - Cr improving  - Follow BUN/Cr  - Avoid nephrotoxic agents     ID:  - No acute needs  - will monitor off abx    Heme:  - trend H&H, transfuse as needed to maintain Hgb > 7    Endo:  -A1C noted to be 11.6  - Consider Endo consult post procedure  - Lantus 15 QHS w/ ISS  -cw statin      Code Status: Full Code  Dispo: Downgrade pending PPM placement 02/24    Case discussed with MICU Attending: Dr. Chong

## 2025-02-23 NOTE — PROGRESS NOTE ADULT - SUBJECTIVE AND OBJECTIVE BOX
Subjective: feels better.Marked improvement in weakness, no chest pain or palpitations      EKG:None from this a.m.  TELE:Continuous ventricular pacing through the temporary wire at 80 bpm    MEDICATIONS  (STANDING):  atorvastatin 40 milliGRAM(s) Oral at bedtime  chlorhexidine 2% Cloths 1 Application(s) Topical <User Schedule>  clopidogrel Tablet 75 milliGRAM(s) Oral daily  dextrose 5%. 1000 milliLiter(s) (50 mL/Hr) IV Continuous <Continuous>  dextrose 50% Injectable 25 Gram(s) IV Push once  glucagon  Injectable 1 milliGRAM(s) IntraMuscular once  heparin   Injectable 5000 Unit(s) SubCutaneous every 8 hours  influenza  Vaccine (HIGH DOSE) 0.5 milliLiter(s) IntraMuscular once  insulin glargine Injectable (LANTUS) 15 Unit(s) SubCutaneous at bedtime  insulin lispro (ADMELOG) corrective regimen sliding scale   SubCutaneous three times a day before meals  ondansetron Injectable 4 milliGRAM(s) IV Push once  pantoprazole  Injectable 40 milliGRAM(s) IV Push daily    MEDICATIONS  (PRN):  dextrose Oral Gel 15 Gram(s) Oral once PRN Blood Glucose LESS THAN 70 milliGRAM(s)/deciliter      Allergies    No Known Allergies    Intolerances        Vital Signs Last 24 Hrs  T(C): 36.6 (23 Feb 2025 07:22), Max: 36.7 (22 Feb 2025 22:18)  T(F): 97.8 (23 Feb 2025 07:22), Max: 98.1 (22 Feb 2025 22:18)  HR: 80 (23 Feb 2025 12:00) (28 - 85)  BP: 108/74 (23 Feb 2025 12:00) (90/58 - 187/76)  BP(mean): 87 (23 Feb 2025 12:00) (70 - 108)  RR: 16 (23 Feb 2025 12:00) (12 - 19)  SpO2: 99% (23 Feb 2025 12:00) (96% - 100%)    Parameters below as of 23 Feb 2025 08:00  Patient On (Oxygen Delivery Method): room air        Physical Exam:  Constitutional: Well-developed, well nourished, in no acute distress  Head: normocephalic atraumatic  Eyes:  extraocular movements intact, sclera anicteric  ENT: mucous membranes moist, pharynx no erythema or swelling  Neck: supple, full range of motion, nontender to palpation midline  Chest wall: normal in appearance, nontender to palpation  Resp: effort normal, breath sounds clear to auscultation bilaterally  Cardiac: Heart regular rate and rhythm, no murmurs, rubs, or gallops.  No edema.  Abdomen: soft, nondistended, bowel sounds active, nontender to palpation in all four quadrants    Musculoskeletal: full range of motion all extremities with no pain, tenderness, swelling, or erythema    Neuro: Alert and oriented x 3, motor & sensation grossly in tact  Skin: color normal, no rashes or injury  Psych: mood calm    LABS:                        13.2   7.82  )-----------( 161      ( 23 Feb 2025 04:34 )             38.2     02-23    140  |  105  |  29.5[H]  ----------------------------<  111[H]  4.4   |  23.0  |  1.58[H]    Ca    8.2[L]      23 Feb 2025 04:34  Phos  3.5     02-23  Mg     2.3     02-23    TPro  5.8[L]  /  Alb  3.5  /  TBili  0.7  /  DBili  x   /  AST  20  /  ALT  19  /  AlkPhos  90  02-23    TTE:< from: TTE W or WO Ultrasound Enhancing Agent (02.22.25 @ 18:12) >  1. Technically difficult image quality. Endocardial border not well visualized.   2. Left ventricular cavity is small. Left ventricular systolic function is hyperdynamic with an ejection fraction of 77 % by Currie's method of disks with an ejection fraction visually estimated at >75 %.   3. The left ventricular diastolic function is indeterminate.   4. Right atrium was not well visualized.   5. Mild mitral regurgitation.   6. Aortic valve anatomy cannot be determined with normal systolic excursion. Fibrocalcific aortic valve sclerosis without stenosis.   7. Mild left ventricular hypertrophy.   8. Trace pericardial effusion noted adjacent to the anterior right ventricle.   9. The right ventricle is not well visualized. probably normal right ventricular systolic function.  10. Tricuspid valve was not well visualized.      < end of copied text >    Urinalysis Basic - ( 23 Feb 2025 04:34 )    Color: x / Appearance: x / SG: x / pH: x  Gluc: 111 mg/dL / Ketone: x  / Bili: x / Urobili: x   Blood: x / Protein: x / Nitrite: x   Leuk Esterase: x / RBC: x / WBC x   Sq Epi: x / Non Sq Epi: x / Bacteria: x        RADIOLOGY & ADDITIONAL TESTS:    Impression:  He has a few weeks of symptoms likely compatible with intermittent AV block starting with a syncopal episode about a month ago when he suddenly fell in his kitchen while speaking to his cousin and sustained a mild injury to his right leg, following which she had intermittent episodes of dizziness and weakness which culminated yesterday with 2 near syncopal events prior to arrival to the ED.He presented in complete heart block with heart rates in the low 30s and at x 20s, and is now doing well status post transvenous pacer implantation.  His echo from yesterday is normal and he has no chest pain and troponins are negative.  He also mentions a normal stress test a few weeks ago.  Lab is unremarkable with exception of some likely sick thyroid manifested by TSH of 7 in the presence of normal T4.  He has not been taking his aspirin though he does have stents, and is not on anticoagulation.  He is planned for a pacemaker early next week.  Recommend:  -Keep n.p.o. midnight in case he could be accommodated tomorrow.  Otherwise, expect a pacemaker on Tuesday.  -Avoid Lovenox or any heparin products  -After the pacemaker.  He should be placed back on aspirin which he stopped months ago without consulting cardiologist  -Avoid beta-blockers and other AV yony blocking agents until permanent pacemaker is in place.

## 2025-02-24 LAB
ALBUMIN SERPL ELPH-MCNC: 3.4 G/DL — SIGNIFICANT CHANGE UP (ref 3.3–5.2)
ALP SERPL-CCNC: 84 U/L — SIGNIFICANT CHANGE UP (ref 40–120)
ALT FLD-CCNC: 16 U/L — SIGNIFICANT CHANGE UP
ANION GAP SERPL CALC-SCNC: 11 MMOL/L — SIGNIFICANT CHANGE UP (ref 5–17)
APTT BLD: 53.1 SEC — HIGH (ref 24.5–35.6)
AST SERPL-CCNC: 16 U/L — SIGNIFICANT CHANGE UP
BASOPHILS # BLD AUTO: 0.03 K/UL — SIGNIFICANT CHANGE UP (ref 0–0.2)
BASOPHILS NFR BLD AUTO: 0.4 % — SIGNIFICANT CHANGE UP (ref 0–2)
BILIRUB SERPL-MCNC: 0.7 MG/DL — SIGNIFICANT CHANGE UP (ref 0.4–2)
BLD GP AB SCN SERPL QL: SIGNIFICANT CHANGE UP
BUN SERPL-MCNC: 30 MG/DL — HIGH (ref 8–20)
CALCIUM SERPL-MCNC: 8.1 MG/DL — LOW (ref 8.4–10.5)
CHLORIDE SERPL-SCNC: 105 MMOL/L — SIGNIFICANT CHANGE UP (ref 96–108)
CO2 SERPL-SCNC: 24 MMOL/L — SIGNIFICANT CHANGE UP (ref 22–29)
CREAT SERPL-MCNC: 1.79 MG/DL — HIGH (ref 0.5–1.3)
EGFR: 40 ML/MIN/1.73M2 — LOW
EOSINOPHIL # BLD AUTO: 0.12 K/UL — SIGNIFICANT CHANGE UP (ref 0–0.5)
EOSINOPHIL NFR BLD AUTO: 1.7 % — SIGNIFICANT CHANGE UP (ref 0–6)
GLUCOSE BLDC GLUCOMTR-MCNC: 109 MG/DL — HIGH (ref 70–99)
GLUCOSE BLDC GLUCOMTR-MCNC: 119 MG/DL — HIGH (ref 70–99)
GLUCOSE BLDC GLUCOMTR-MCNC: 124 MG/DL — HIGH (ref 70–99)
GLUCOSE BLDC GLUCOMTR-MCNC: 87 MG/DL — SIGNIFICANT CHANGE UP (ref 70–99)
GLUCOSE SERPL-MCNC: 119 MG/DL — HIGH (ref 70–99)
HCT VFR BLD CALC: 40.4 % — SIGNIFICANT CHANGE UP (ref 39–50)
HGB BLD-MCNC: 13.3 G/DL — SIGNIFICANT CHANGE UP (ref 13–17)
IMM GRANULOCYTES # BLD AUTO: 0.02 K/UL — SIGNIFICANT CHANGE UP (ref 0–0.07)
IMM GRANULOCYTES NFR BLD AUTO: 0.3 % — SIGNIFICANT CHANGE UP (ref 0–0.9)
LYMPHOCYTES # BLD AUTO: 1.57 K/UL — SIGNIFICANT CHANGE UP (ref 1–3.3)
LYMPHOCYTES NFR BLD AUTO: 22.6 % — SIGNIFICANT CHANGE UP (ref 13–44)
MAGNESIUM SERPL-MCNC: 2.2 MG/DL — SIGNIFICANT CHANGE UP (ref 1.6–2.6)
MCHC RBC-ENTMCNC: 28.4 PG — SIGNIFICANT CHANGE UP (ref 27–34)
MCHC RBC-ENTMCNC: 32.9 G/DL — SIGNIFICANT CHANGE UP (ref 32–36)
MCV RBC AUTO: 86.3 FL — SIGNIFICANT CHANGE UP (ref 80–100)
MONOCYTES # BLD AUTO: 0.46 K/UL — SIGNIFICANT CHANGE UP (ref 0–0.9)
MONOCYTES NFR BLD AUTO: 6.6 % — SIGNIFICANT CHANGE UP (ref 2–14)
NEUTROPHILS # BLD AUTO: 4.76 K/UL — SIGNIFICANT CHANGE UP (ref 1.8–7.4)
NEUTROPHILS NFR BLD AUTO: 68.4 % — SIGNIFICANT CHANGE UP (ref 43–77)
NRBC # BLD AUTO: 0 K/UL — SIGNIFICANT CHANGE UP (ref 0–0)
NRBC # FLD: 0 K/UL — SIGNIFICANT CHANGE UP (ref 0–0)
NRBC BLD AUTO-RTO: 0 /100 WBCS — SIGNIFICANT CHANGE UP (ref 0–0)
PHOSPHATE SERPL-MCNC: 3 MG/DL — SIGNIFICANT CHANGE UP (ref 2.4–4.7)
PLATELET # BLD AUTO: 150 K/UL — SIGNIFICANT CHANGE UP (ref 150–400)
PMV BLD: 11.2 FL — SIGNIFICANT CHANGE UP (ref 7–13)
POTASSIUM SERPL-MCNC: 4.5 MMOL/L — SIGNIFICANT CHANGE UP (ref 3.5–5.3)
POTASSIUM SERPL-SCNC: 4.5 MMOL/L — SIGNIFICANT CHANGE UP (ref 3.5–5.3)
PROT SERPL-MCNC: 5.9 G/DL — LOW (ref 6.6–8.7)
RBC # BLD: 4.68 M/UL — SIGNIFICANT CHANGE UP (ref 4.2–5.8)
RBC # FLD: 12.4 % — SIGNIFICANT CHANGE UP (ref 10.3–14.5)
SODIUM SERPL-SCNC: 140 MMOL/L — SIGNIFICANT CHANGE UP (ref 135–145)
WBC # BLD: 6.96 K/UL — SIGNIFICANT CHANGE UP (ref 3.8–10.5)
WBC # FLD AUTO: 6.96 K/UL — SIGNIFICANT CHANGE UP (ref 3.8–10.5)

## 2025-02-24 PROCEDURE — 33208 INSRT HEART PM ATRIAL & VENT: CPT

## 2025-02-24 PROCEDURE — 71045 X-RAY EXAM CHEST 1 VIEW: CPT | Mod: 26

## 2025-02-24 PROCEDURE — 99291 CRITICAL CARE FIRST HOUR: CPT | Mod: GC

## 2025-02-24 PROCEDURE — 93010 ELECTROCARDIOGRAM REPORT: CPT

## 2025-02-24 PROCEDURE — 99152 MOD SED SAME PHYS/QHP 5/>YRS: CPT

## 2025-02-24 PROCEDURE — 99231 SBSQ HOSP IP/OBS SF/LOW 25: CPT

## 2025-02-24 RX ORDER — CEFAZOLIN SODIUM IN 0.9 % NACL 3 G/100 ML
2000 INTRAVENOUS SOLUTION, PIGGYBACK (ML) INTRAVENOUS ONCE
Refills: 0 | Status: DISCONTINUED | OUTPATIENT
Start: 2025-02-24 | End: 2025-02-26

## 2025-02-24 RX ORDER — ACETAMINOPHEN 500 MG/5ML
1000 LIQUID (ML) ORAL ONCE
Refills: 0 | Status: COMPLETED | OUTPATIENT
Start: 2025-02-24 | End: 2025-02-24

## 2025-02-24 RX ADMIN — HEPARIN SODIUM 5000 UNIT(S): 1000 INJECTION INTRAVENOUS; SUBCUTANEOUS at 15:31

## 2025-02-24 RX ADMIN — HEPARIN SODIUM 5000 UNIT(S): 1000 INJECTION INTRAVENOUS; SUBCUTANEOUS at 06:13

## 2025-02-24 RX ADMIN — Medication 400 MILLIGRAM(S): at 21:20

## 2025-02-24 RX ADMIN — CLOPIDOGREL BISULFATE 75 MILLIGRAM(S): 75 TABLET, FILM COATED ORAL at 12:49

## 2025-02-24 RX ADMIN — INSULIN GLARGINE-YFGN 15 UNIT(S): 100 INJECTION, SOLUTION SUBCUTANEOUS at 22:59

## 2025-02-24 RX ADMIN — Medication 1000 MILLIGRAM(S): at 21:50

## 2025-02-24 RX ADMIN — ATORVASTATIN CALCIUM 40 MILLIGRAM(S): 80 TABLET, FILM COATED ORAL at 22:54

## 2025-02-24 RX ADMIN — Medication 1 APPLICATION(S): at 06:19

## 2025-02-24 NOTE — CHART NOTE - NSCHARTNOTEFT_GEN_A_CORE
MICU Transfer Note    Transfer from: MICU    Transfer to:  ( X ) Telemetry/    Accepting physician:    HPI:    This is a 73 y/o M with PMHx, CAD s/p stent x2 in Oct 2019, IDDM (Last A1C, 12), HTN, HLD, s/p deepti, active tobacco use disorder, who presented to [facility] from home after a syncopal event reported to have lasted only a few seconds. Patient was advised by his cardiologist to present to ED. On arrival, patient reporting weakness and lightheadedness for several days. Patient was diaphoretic and dizzy in ED, with eventual second brief syncopal event. ECG revealed CHB with HR between 20-30 BPM. EP consulted, recommended TVP which was successfully performed in ED (2/22/25). Patient subsequently admitted to MICU and started on dopamine. Dopamine off since 2/23/25 at 8:00. Of note, pt had PSA of 25 and A1c of 11.6 on admission.    MICU COURSE:  2/22: Admitted to MICU with CHB requiring TVP. Briefly on dopamine infusion.  2/23: Pending PPM on 2/24 2/24: S/P Dual chamber PPM, dispo planning pending        ASSESSMENT & PLAN:     1- Recurrent syncope with findings of Complete heart block, s/p TVP placement with good capture; now S/P Dual Chamber PPM; TTE with EF of >70%  2- Poorly controlled diabetes with A1C 11. 6; will need DC with Insulin  3- CAD s/p stent x2, current smoker; counseling provider for cessation  4- Elevated PSA by report with frequent urination, concern for possible Prostate Cancer vs just BPH; F/U outpatient, will need scheduling/referral  5- ANSHU          For Followup:  -Restart ASA after PPM per EP  -X-ray in AM s/p PPM      Vital Signs Last 24 Hrs  T(C): 36.7 (24 Feb 2025 16:16), Max: 37 (23 Feb 2025 23:00)  T(F): 98 (24 Feb 2025 16:16), Max: 98.6 (23 Feb 2025 23:00)  HR: 80 (24 Feb 2025 16:58) (80 - 80)  BP: 142/79 (24 Feb 2025 16:58) (88/57 - 145/75)  BP(mean): 92 (24 Feb 2025 16:00) (68 - 101)  RR: 16 (24 Feb 2025 16:58) (12 - 18)  SpO2: 97% (24 Feb 2025 16:58) (92% - 100%)    Parameters below as of 24 Feb 2025 16:58  Patient On (Oxygen Delivery Method): room air      I&O's Summary    23 Feb 2025 07:01  -  24 Feb 2025 07:00  --------------------------------------------------------  IN: 0 mL / OUT: 1300 mL / NET: -1300 mL    24 Feb 2025 07:01  -  24 Feb 2025 19:28  --------------------------------------------------------  IN: 0 mL / OUT: 500 mL / NET: -500 mL        MEDICATIONS  (STANDING):  atorvastatin 40 milliGRAM(s) Oral at bedtime  chlorhexidine 2% Cloths 1 Application(s) Topical <User Schedule>  clopidogrel Tablet 75 milliGRAM(s) Oral daily  dextrose 5%. 1000 milliLiter(s) (50 mL/Hr) IV Continuous <Continuous>  dextrose 50% Injectable 25 Gram(s) IV Push once  glucagon  Injectable 1 milliGRAM(s) IntraMuscular once  influenza  Vaccine (HIGH DOSE) 0.5 milliLiter(s) IntraMuscular once  insulin glargine Injectable (LANTUS) 15 Unit(s) SubCutaneous at bedtime  insulin lispro (ADMELOG) corrective regimen sliding scale   SubCutaneous three times a day before meals  ondansetron Injectable 4 milliGRAM(s) IV Push once  pantoprazole    Tablet 40 milliGRAM(s) Oral before breakfast    MEDICATIONS  (PRN):  dextrose Oral Gel 15 Gram(s) Oral once PRN Blood Glucose LESS THAN 70 milliGRAM(s)/deciliter        LABS                                            13.3                  Neurophils% (auto):   68.4   (02-24 @ 03:15):    6.96 )-----------(150          Lymphocytes% (auto):  22.6                                          40.4                   Eosinphils% (auto):   1.7      Manual%: Neutrophils x    ; Lymphocytes x    ; Eosinophils x    ; Bands%: x    ; Blasts x                                    140    |  105    |  30.0                Calcium: 8.1   / iCa: x      (02-24 @ 03:15)    ----------------------------<  119       Magnesium: 2.2                              4.5     |  24.0   |  1.79             Phosphorous: 3.0      TPro  5.9    /  Alb  3.4    /  TBili  0.7    /  DBili  x      /  AST  16     /  ALT  16     /  AlkPhos  84     24 Feb 2025 03:15    ( 02-24 @ 03:15 )   PT: x    ;   INR: x      aPTT: 53.1 sec        atorvastatin 40 milliGRAM(s) Oral at bedtime  chlorhexidine 2% Cloths 1 Application(s) Topical <User Schedule>  clopidogrel Tablet 75 milliGRAM(s) Oral daily  dextrose 5%. 1000 milliLiter(s) IV Continuous <Continuous>  dextrose 50% Injectable 25 Gram(s) IV Push once  dextrose Oral Gel 15 Gram(s) Oral once PRN  glucagon  Injectable 1 milliGRAM(s) IntraMuscular once  influenza  Vaccine (HIGH DOSE) 0.5 milliLiter(s) IntraMuscular once  insulin glargine Injectable (LANTUS) 15 Unit(s) SubCutaneous at bedtime  insulin lispro (ADMELOG) corrective regimen sliding scale   SubCutaneous three times a day before meals  ondansetron Injectable 4 milliGRAM(s) IV Push once  pantoprazole    Tablet 40 milliGRAM(s) Oral before breakfast MICU Transfer Note    Transfer from: MICU    Transfer to:  ( X ) Telemetry/    Accepting physician: Karla Boss    HPI:    This is a 73 y/o M with PMHx, CAD s/p stent x2 in Oct 2019, IDDM (Last A1C, 12), HTN, HLD, s/p deepti, active tobacco use disorder, who presented to [facility] from home after a syncopal event reported to have lasted only a few seconds. Patient was advised by his cardiologist to present to ED. On arrival, patient reporting weakness and lightheadedness for several days. Patient was diaphoretic and dizzy in ED, with eventual second brief syncopal event. ECG revealed CHB with HR between 20-30 BPM. EP consulted, recommended TVP which was successfully performed in ED (2/22/25). Patient subsequently admitted to MICU and started on dopamine. Dopamine off since 2/23/25 at 8:00. Of note, pt had PSA of 25 and A1c of 11.6 on admission.    MICU COURSE:  2/22: Admitted to MICU with CHB requiring TVP. Briefly on dopamine infusion.  2/23: Pending PPM on 2/24 2/24: S/P Dual chamber PPM, dispo planning pending        ASSESSMENT & PLAN:     1- Recurrent syncope with findings of Complete heart block, s/p TVP placement with good capture; now S/P Dual Chamber PPM; TTE with EF of >70%  2- Poorly controlled diabetes with A1C 11. 6; will need DC with Insulin  3- CAD s/p stent x2, current smoker; counseling provider for cessation  4- Elevated PSA by report with frequent urination, concern for possible Prostate Cancer vs just BPH; F/U outpatient, will need scheduling/referral  5- ANSHU          For Followup:  -Restart ASA after PPM per EP recs  -X-ray in AM s/p PPM  -F/U am labs  -PT in am for dispo planning      Vital Signs Last 24 Hrs  T(C): 36.7 (24 Feb 2025 16:16), Max: 37 (23 Feb 2025 23:00)  T(F): 98 (24 Feb 2025 16:16), Max: 98.6 (23 Feb 2025 23:00)  HR: 80 (24 Feb 2025 16:58) (80 - 80)  BP: 142/79 (24 Feb 2025 16:58) (88/57 - 145/75)  BP(mean): 92 (24 Feb 2025 16:00) (68 - 101)  RR: 16 (24 Feb 2025 16:58) (12 - 18)  SpO2: 97% (24 Feb 2025 16:58) (92% - 100%)    Parameters below as of 24 Feb 2025 16:58  Patient On (Oxygen Delivery Method): room air      I&O's Summary    23 Feb 2025 07:01  -  24 Feb 2025 07:00  --------------------------------------------------------  IN: 0 mL / OUT: 1300 mL / NET: -1300 mL    24 Feb 2025 07:01  -  24 Feb 2025 19:28  --------------------------------------------------------  IN: 0 mL / OUT: 500 mL / NET: -500 mL        MEDICATIONS  (STANDING):  atorvastatin 40 milliGRAM(s) Oral at bedtime  chlorhexidine 2% Cloths 1 Application(s) Topical <User Schedule>  clopidogrel Tablet 75 milliGRAM(s) Oral daily  dextrose 5%. 1000 milliLiter(s) (50 mL/Hr) IV Continuous <Continuous>  dextrose 50% Injectable 25 Gram(s) IV Push once  glucagon  Injectable 1 milliGRAM(s) IntraMuscular once  influenza  Vaccine (HIGH DOSE) 0.5 milliLiter(s) IntraMuscular once  insulin glargine Injectable (LANTUS) 15 Unit(s) SubCutaneous at bedtime  insulin lispro (ADMELOG) corrective regimen sliding scale   SubCutaneous three times a day before meals  ondansetron Injectable 4 milliGRAM(s) IV Push once  pantoprazole    Tablet 40 milliGRAM(s) Oral before breakfast    MEDICATIONS  (PRN):  dextrose Oral Gel 15 Gram(s) Oral once PRN Blood Glucose LESS THAN 70 milliGRAM(s)/deciliter        LABS                                            13.3                  Neurophils% (auto):   68.4   (02-24 @ 03:15):    6.96 )-----------(150          Lymphocytes% (auto):  22.6                                          40.4                   Eosinphils% (auto):   1.7      Manual%: Neutrophils x    ; Lymphocytes x    ; Eosinophils x    ; Bands%: x    ; Blasts x                                    140    |  105    |  30.0                Calcium: 8.1   / iCa: x      (02-24 @ 03:15)    ----------------------------<  119       Magnesium: 2.2                              4.5     |  24.0   |  1.79             Phosphorous: 3.0      TPro  5.9    /  Alb  3.4    /  TBili  0.7    /  DBili  x      /  AST  16     /  ALT  16     /  AlkPhos  84     24 Feb 2025 03:15    ( 02-24 @ 03:15 )   PT: x    ;   INR: x      aPTT: 53.1 sec        atorvastatin 40 milliGRAM(s) Oral at bedtime  chlorhexidine 2% Cloths 1 Application(s) Topical <User Schedule>  clopidogrel Tablet 75 milliGRAM(s) Oral daily  dextrose 5%. 1000 milliLiter(s) IV Continuous <Continuous>  dextrose 50% Injectable 25 Gram(s) IV Push once  dextrose Oral Gel 15 Gram(s) Oral once PRN  glucagon  Injectable 1 milliGRAM(s) IntraMuscular once  influenza  Vaccine (HIGH DOSE) 0.5 milliLiter(s) IntraMuscular once  insulin glargine Injectable (LANTUS) 15 Unit(s) SubCutaneous at bedtime  insulin lispro (ADMELOG) corrective regimen sliding scale   SubCutaneous three times a day before meals  ondansetron Injectable 4 milliGRAM(s) IV Push once  pantoprazole    Tablet 40 milliGRAM(s) Oral before breakfast

## 2025-02-24 NOTE — PROGRESS NOTE ADULT - ASSESSMENT
64-year-old woman with coronary artery disease and uncontrolled diabetes and baseline right bundle branch block who presented after about a month of intermittent presyncopal and syncopal episodes and found to be in complete heart block.  Now with TVP pacing well and feels fine.  Echo normal cardiac function.    Plan for dual-chamber pacemaker, hopefully today.   - Keep n.p.o.  -Avoid heparin products until otherwise indicated by EP  -Restart aspirin after the procedure once cleared by EP for his CAD (stopped taking on his own).

## 2025-02-24 NOTE — PROGRESS NOTE ADULT - SUBJECTIVE AND OBJECTIVE BOX
Subjective: feels well. no weakness dizziness or CP      EKG: none from this AM  TELE: V paced 80 bpm    MEDICATIONS  (STANDING):  atorvastatin 40 milliGRAM(s) Oral at bedtime  chlorhexidine 2% Cloths 1 Application(s) Topical <User Schedule>  clopidogrel Tablet 75 milliGRAM(s) Oral daily  dextrose 5%. 1000 milliLiter(s) (50 mL/Hr) IV Continuous <Continuous>  dextrose 50% Injectable 25 Gram(s) IV Push once  glucagon  Injectable 1 milliGRAM(s) IntraMuscular once  heparin   Injectable 5000 Unit(s) SubCutaneous every 8 hours  influenza  Vaccine (HIGH DOSE) 0.5 milliLiter(s) IntraMuscular once  insulin glargine Injectable (LANTUS) 15 Unit(s) SubCutaneous at bedtime  insulin lispro (ADMELOG) corrective regimen sliding scale   SubCutaneous three times a day before meals  ondansetron Injectable 4 milliGRAM(s) IV Push once  pantoprazole  Injectable 40 milliGRAM(s) IV Push daily    MEDICATIONS  (PRN):  dextrose Oral Gel 15 Gram(s) Oral once PRN Blood Glucose LESS THAN 70 milliGRAM(s)/deciliter      Allergies    No Known Allergies    Intolerances        Vital Signs Last 24 Hrs  T(C): 36.7 (24 Feb 2025 07:38), Max: 37 (23 Feb 2025 23:00)  T(F): 98 (24 Feb 2025 07:38), Max: 98.6 (23 Feb 2025 23:00)  HR: 80 (24 Feb 2025 08:00) (80 - 83)  BP: 118/72 (24 Feb 2025 08:00) (88/57 - 130/84)  BP(mean): 88 (24 Feb 2025 08:00) (68 - 101)  RR: 14 (24 Feb 2025 08:00) (12 - 18)  SpO2: 97% (24 Feb 2025 08:00) (94% - 100%)    Parameters below as of 24 Feb 2025 08:00  Patient On (Oxygen Delivery Method): room air        Physical Exam:  Constitutional: Well-developed, well nourished, in no acute distress  Head: normocephalic atraumatic  Eyes:  extraocular movements intact, sclera anicteric  ENT: mucous membranes moist, pharynx no erythema or swelling  Neck: supple, full range of motion, nontender to palpation midline  Chest wall: normal in appearance, nontender to palpation  Resp: effort normal, breath sounds clear to auscultation bilaterally  Cardiac: Heart regular rate and rhythm, no murmurs, rubs, or gallops.  No edema.  Abdomen: soft, nondistended, bowel sounds active, nontender to palpation in all four quadrants    Musculoskeletal: full range of motion all extremities with no pain, tenderness, swelling, or erythema    Neuro: Alert and oriented x 3, motor & sensation grossly in tact  Skin: color normal, no rashes or injury  Psych: mood calm    LABS:                        13.3   6.96  )-----------( 150      ( 24 Feb 2025 03:15 )             40.4     02-24    140  |  105  |  30.0[H]  ----------------------------<  119[H]  4.5   |  24.0  |  1.79[H]    Ca    8.1[L]      24 Feb 2025 03:15  Phos  3.0     02-24  Mg     2.2     02-24    TPro  5.9[L]  /  Alb  3.4  /  TBili  0.7  /  DBili  x   /  AST  16  /  ALT  16  /  AlkPhos  84  02-24    PTT - ( 24 Feb 2025 03:15 )  PTT:53.1 sec  Urinalysis Basic - ( 24 Feb 2025 03:15 )    Color: x / Appearance: x / SG: x / pH: x  Gluc: 119 mg/dL / Ketone: x  / Bili: x / Urobili: x   Blood: x / Protein: x / Nitrite: x   Leuk Esterase: x / RBC: x / WBC x   Sq Epi: x / Non Sq Epi: x / Bacteria: x

## 2025-02-24 NOTE — PROGRESS NOTE ADULT - SUBJECTIVE AND OBJECTIVE BOX
PROCEDURE(S): Dual Chamber PPM Implant - LBB nonselective area pacing     ELECTROPHYSIOLOGIST(S): Juan Carlos Petersen MD    COMPLICATIONS:  none          CONDITION: Stable    Pt doing well s/p Dual Chamber PPM Implant - LBB nonselective area pacing. Denies complaint    Exam:   T(C): 36.7 (02-24-25 @ 16:16), Max: 37 (02-23-25 @ 23:00)  HR: 80 (02-24-25 @ 21:50) (78 - 82)  BP: 127/62 (02-24-25 @ 21:50) (88/57 - 145/75)  RR: 17 (02-24-25 @ 21:50) (12 - 18)  SpO2: 97% (02-24-25 @ 21:50) (92% - 100%)  Wt(kg): --  VSS, NAD, A&O x 3  Incision: Dressing C/D/I; no bleeding, hematoma, erythema or edema  Card: S1/S2, RRR, no m/g/r  Resp: lungs CTA b/l  Abd: S/NT/ND  Ext: no edema, distal pulses intact    ECG:       Assessment:   74 y.o. male with PMhx of CAD s/p stent x2 in Oct 2019, IDDM (Last A1C, 12), HTN, HLD, s/p deepti, active tobacco use disorder, who presented to [facility] from home after a syncopal event reported to have lasted only a few seconds. Patient was advised by his cardiologist to present to ED. On arrival, patient reporting weakness and lightheadedness for several days. Patient was diaphoretic and dizzy in ED, with eventual second brief syncopal event. ECG revealed CHB with HR between 20-30 BPM. EP consulted, recommended TVP which was successfully performed in ED (2/22/25). Patient subsequently admitted to MICU and started on dopamine. Dopamine off since 2/23/25 at 8:00. Of note, pt had PSA of 25 and A1c of 11.6 on admission.      Pt now status post uncomplicated Dual Chamber PPM Implant - LBB nonselective area pacing.    Plan:   Continue to monitor on telemetry   - Port CXR now - r/o PTX or effusion, verify lead locations.   - Bedrest x 4 hours post implant, then OOB w/ assist & progress as tolerated.    - Cont Ancef 2gm IV q 8 hours x 2 additional doses to complete 24 hour course.   - Pain control with PO analgesia PRN.   - NO HEPARIN OR LOVENOX, INCLUDING PROPHYLACTIC/SUBCUT DOSING, UNTIL OTHERWISE ADVISED BY EP.   - Resume home medications.   - ECG, Labs, PA/Lat CXR and device check in AM.   - Keep site dry x 1 week, no lifting affected arm over shoulder x 6 weeks  - Further management per primary team  PROCEDURE(S): Dual Chamber PPM Implant - LBB nonselective area pacing     ELECTROPHYSIOLOGIST(S): Juan Carlos Petersen MD    COMPLICATIONS:  none          CONDITION: Stable    Pt doing well s/p Dual Chamber PPM Implant - LBB nonselective area pacing. Denies complaint    Exam:   T(C): 36.7 (02-24-25 @ 16:16), Max: 37 (02-23-25 @ 23:00)  HR: 80 (02-24-25 @ 21:50) (78 - 82)  BP: 127/62 (02-24-25 @ 21:50) (88/57 - 145/75)  RR: 17 (02-24-25 @ 21:50) (12 - 18)  SpO2: 97% (02-24-25 @ 21:50) (92% - 100%)  Wt(kg): --  VSS, NAD, A&O x 3  Incision: Dressing C/D/I; no bleeding, hematoma, erythema or edema  Card: S1/S2, RRR, no m/g/r  Resp: lungs CTA b/l  Abd: S/NT/ND  Ext: no edema, distal pulses intact    ECG: AV paced    Assessment:   74 y.o. male with PMhx of CAD s/p stent x2 in Oct 2019, IDDM (Last A1C, 12), HTN, HLD, s/p deepti, active tobacco use disorder, who presented to [facility] from home after a syncopal event reported to have lasted only a few seconds. Patient was advised by his cardiologist to present to ED. On arrival, patient reporting weakness and lightheadedness for several days. Patient was diaphoretic and dizzy in ED, with eventual second brief syncopal event. ECG revealed CHB with HR between 20-30 BPM. EP consulted, recommended TVP which was successfully performed in ED (2/22/25). Patient subsequently admitted to MICU and started on dopamine. Dopamine off since 2/23/25 at 8:00. Of note, pt had PSA of 25 and A1c of 11.6 on admission.      Pt now status post uncomplicated Dual Chamber PPM Implant - LBB nonselective area pacing.    Plan:   Continue to monitor on telemetry   - Port CXR now - r/o PTX or effusion, verify lead locations.   - Bedrest x 4 hours post implant, then OOB w/ assist & progress as tolerated.    - Cont Ancef 2gm IV q 8 hours x 2 additional doses to complete 24 hour course.   - Pain control with PO analgesia PRN.   - NO HEPARIN OR LOVENOX, INCLUDING PROPHYLACTIC/SUBCUT DOSING, UNTIL OTHERWISE ADVISED BY EP.   - Resume home medications.   - ECG, Labs, PA/Lat CXR and device check in AM.   - Keep site dry x 1 week, no lifting affected arm over shoulder x 6 weeks  - Further management per primary team

## 2025-02-24 NOTE — PROGRESS NOTE ADULT - ATTENDING COMMENTS
.    74M PMH smoker, CAD s/p PCI, HTN, HLD, DM who presented 2/22/25 with a syncopal episode in addition to multiple episodes of lightheadedness and fatigue at home. He was found with CHB, had TVP placed in the ED. Tentative plan for PPM tomorrow 2/24/25, will make NPOAMN. Is on Plavix; resume ASA after PPM placed. Holding all AV yony blocking agents. Hemodynamics are stable. C/w monitoring in ICU.       Julien Chong MD, [facility]  , Pulmonary & Critical Care Medicine  [facility]  Pulmonary and Sleep Medicine at [place]  [address]  [place] [address]  T: [phone]  F: [phone]
73 y/o M with PMHx, CAD s/p stent x2 in Oct 2019, IDDM (Last A1C, 12), HTN, HLD, s/p deepti, Smoker, admitted to MICU after syncopal episode and found to be in CHB and Mobitz type 2 2:1 AVB requiring emergent TVP placement in the ED w/ plans for PPM either today or tomorrow. Currently V-paced, on DDD, rate 80, 5.0 mA. At approximately 1.5 mA he loses capture. No longer on dopamine. MAP has been above 65. If any emergency related to the TVO, instructed staff to notify me and EP team and to begin emergent TCP. Keep TVP on current settings. Monitor in MICU until PPM placement.

## 2025-02-24 NOTE — PROGRESS NOTE ADULT - CRITICAL CARE ATTENDING COMMENT
Critical care time spent managing transvenous pacemaker settings and its placement in this pt with hemodynamic compromise from complete heart block.

## 2025-02-24 NOTE — PROGRESS NOTE ADULT - SUBJECTIVE AND OBJECTIVE BOX
Patient is a 74y old  Male who presents with a chief complaint of Complete Heart Block (23 Feb 2025 12:27)      BRIEF HOSPITAL COURSE: ***    Events last 24 hours: ***    PAST MEDICAL & SURGICAL HISTORY:  DM (diabetes mellitus)      HLD (hyperlipidemia)      CAD (coronary artery disease)      HTN (hypertension)      BPH with elevated PSA      Bilateral cataracts      Gall bladder stones      H/O cardiac catheterization      S/P cholecystectomy      Bilateral cataracts            Medications:        ondansetron Injectable 4 milliGRAM(s) IV Push once      clopidogrel Tablet 75 milliGRAM(s) Oral daily  heparin   Injectable 5000 Unit(s) SubCutaneous every 8 hours    pantoprazole  Injectable 40 milliGRAM(s) IV Push daily      atorvastatin 40 milliGRAM(s) Oral at bedtime  dextrose 50% Injectable 25 Gram(s) IV Push once  dextrose Oral Gel 15 Gram(s) Oral once PRN  glucagon  Injectable 1 milliGRAM(s) IntraMuscular once  insulin glargine Injectable (LANTUS) 15 Unit(s) SubCutaneous at bedtime  insulin lispro (ADMELOG) corrective regimen sliding scale   SubCutaneous three times a day before meals    dextrose 5%. 1000 milliLiter(s) IV Continuous <Continuous>    influenza  Vaccine (HIGH DOSE) 0.5 milliLiter(s) IntraMuscular once    chlorhexidine 2% Cloths 1 Application(s) Topical <User Schedule>            ICU Vital Signs Last 24 Hrs  T(C): 37 (23 Feb 2025 23:00), Max: 37 (23 Feb 2025 23:00)  T(F): 98.6 (23 Feb 2025 23:00), Max: 98.6 (23 Feb 2025 23:00)  HR: 80 (24 Feb 2025 07:00) (80 - 83)  BP: 108/94 (24 Feb 2025 07:00) (88/57 - 130/84)  BP(mean): 101 (24 Feb 2025 07:00) (68 - 101)  ABP: --  ABP(mean): --  RR: 16 (24 Feb 2025 07:00) (12 - 18)  SpO2: 95% (24 Feb 2025 07:00) (94% - 100%)    O2 Parameters below as of 24 Feb 2025 04:00  Patient On (Oxygen Delivery Method): room air                I&O's Detail    23 Feb 2025 07:01  -  24 Feb 2025 07:00  --------------------------------------------------------  IN:  Total IN: 0 mL    OUT:    Voided (mL): 1300 mL  Total OUT: 1300 mL    Total NET: -1300 mL            LABS:                        13.3   6.96  )-----------( 150      ( 24 Feb 2025 03:15 )             40.4     02-24    140  |  105  |  30.0[H]  ----------------------------<  119[H]  4.5   |  24.0  |  1.79[H]    Ca    8.1[L]      24 Feb 2025 03:15  Phos  3.0     02-24  Mg     2.2     02-24    TPro  5.9[L]  /  Alb  3.4  /  TBili  0.7  /  DBili  x   /  AST  16  /  ALT  16  /  AlkPhos  84  02-24          CAPILLARY BLOOD GLUCOSE      POCT Blood Glucose.: 119 mg/dL (24 Feb 2025 06:11)    PTT - ( 24 Feb 2025 03:15 )  PTT:53.1 sec  Urinalysis Basic - ( 24 Feb 2025 03:15 )    Color: x / Appearance: x / SG: x / pH: x  Gluc: 119 mg/dL / Ketone: x  / Bili: x / Urobili: x   Blood: x / Protein: x / Nitrite: x   Leuk Esterase: x / RBC: x / WBC x   Sq Epi: x / Non Sq Epi: x / Bacteria: x      CULTURES:    Physical Examination:    General: No acute distress.      HEENT: Pupils equal, reactive to light.  Symmetric.    PULM: Clear to auscultation bilaterally, no significant sputum production    NECK: Supple, no lymphadenopathy, trachea midline    CVS: Regular rate and rhythm, no murmurs, rubs, or gallops    ABD: Soft, nondistended, nontender, normoactive bowel sounds, no masses    EXT: No edema, nontender    SKIN: Warm and well perfused, no rashes noted.    NEURO: Alert, oriented, interactive, nonfocal    LINES: TVP in RIJ    DEVICES:     RADIOLOGY: < from: Xray Chest 1 View- PORTABLE-Routine (Xray Chest 1 View- PORTABLE-Routine .) (02.22.25 @ 22:58) >    INTERPRETATION:  TECHNIQUE: Multiple studies of the chest.    COMPARISON:  10/21/2022    CLINICAL HISTORY: Chest Pain    FINDINGS:    5:28 PM. Single frontal view of the chest demonstrates the lungs to be   clear. The cardiomediastinal silhouette is enlarged. Right-sided central   venous catheter sheath in the proximal SVC.. No acute osseous   abnormalities. Overlying EKG leads and wires are noted    6:01 PM. Right-sided transvenous pacer in the right atrium    10:15 PM. Right-sided transvenous pacer in the right ventricle. Clear   lungs.    IMPRESSION: No acute cardiopulmonary disease process.    --- End of Report ---    < end of copied text >  < from: CT Wrist No Cont, Right (09.26.23 @ 15:01) >    IMPRESSION:  1.  Nondisplaced intra-articular fracture at the base of the second   metacarpal with extension into the second CMC joint. Early bridging   trabecular bone is identified consistent with healing.  2.  Small subcutaneous hematoma in the dorsum of the hand overlying the   second metacarpal measuring 26 x 11 x 26 mm.    --- End of Report ---    < end of copied text >      CRITICAL CARE TIME SPENT: ***   Patient is a 74y old  Male who presents with a chief complaint of Complete Heart Block (23 Feb 2025 12:27)      BRIEF HOSPITAL COURSE:   73 y/o M with PMHx, CAD s/p stent x2 in Oct 2019, IDDM (Last A1C, 12), HTN, HLD, s/p deepti, active tobacco use disorder, who presented to [facility] from home after a syncopal event reported to have lasted only a few seconds. Patient was advised by his cardiologist to present to ED. On arrival, patient reporting weakness and lightheadedness for several days PTA. Patient was diaphoretic and dizzy in ED, with eventual second brief syncopal event. ECG revealed CHB with HR between 20-30 BPM. EP consulted, recommended TVP which was successfuly performed in ED. Patient subsequently admitted to MICU and started on dobutamine. Plan is for PPM placement on 02/24.    Events last 24 hours:   - Patient seen and assessed during morning MICU rounds  - Offered no acute complaints, other than hunger  - Diet resumed as procedure planned for monday 02/24, NPO AFTER MIDNIGHT 02/23  - denies CP, or hx of tick exposure/camping      PAST MEDICAL & SURGICAL HISTORY:  DM (diabetes mellitus)      HLD (hyperlipidemia)      CAD (coronary artery disease)      HTN (hypertension)      BPH with elevated PSA      Bilateral cataracts      Gall bladder stones      H/O cardiac catheterization      S/P cholecystectomy      Bilateral cataracts            Medications:        ondansetron Injectable 4 milliGRAM(s) IV Push once      clopidogrel Tablet 75 milliGRAM(s) Oral daily  heparin   Injectable 5000 Unit(s) SubCutaneous every 8 hours    pantoprazole  Injectable 40 milliGRAM(s) IV Push daily      atorvastatin 40 milliGRAM(s) Oral at bedtime  dextrose 50% Injectable 25 Gram(s) IV Push once  dextrose Oral Gel 15 Gram(s) Oral once PRN  glucagon  Injectable 1 milliGRAM(s) IntraMuscular once  insulin glargine Injectable (LANTUS) 15 Unit(s) SubCutaneous at bedtime  insulin lispro (ADMELOG) corrective regimen sliding scale   SubCutaneous three times a day before meals    dextrose 5%. 1000 milliLiter(s) IV Continuous <Continuous>    influenza  Vaccine (HIGH DOSE) 0.5 milliLiter(s) IntraMuscular once    chlorhexidine 2% Cloths 1 Application(s) Topical <User Schedule>            ICU Vital Signs Last 24 Hrs  T(C): 37 (23 Feb 2025 23:00), Max: 37 (23 Feb 2025 23:00)  T(F): 98.6 (23 Feb 2025 23:00), Max: 98.6 (23 Feb 2025 23:00)  HR: 80 (24 Feb 2025 07:00) (80 - 83)  BP: 108/94 (24 Feb 2025 07:00) (88/57 - 130/84)  BP(mean): 101 (24 Feb 2025 07:00) (68 - 101)  ABP: --  ABP(mean): --  RR: 16 (24 Feb 2025 07:00) (12 - 18)  SpO2: 95% (24 Feb 2025 07:00) (94% - 100%)    O2 Parameters below as of 24 Feb 2025 04:00  Patient On (Oxygen Delivery Method): room air                I&O's Detail    23 Feb 2025 07:01  -  24 Feb 2025 07:00  --------------------------------------------------------  IN:  Total IN: 0 mL    OUT:    Voided (mL): 1300 mL  Total OUT: 1300 mL    Total NET: -1300 mL            LABS:                        13.3   6.96  )-----------( 150      ( 24 Feb 2025 03:15 )             40.4     02-24    140  |  105  |  30.0[H]  ----------------------------<  119[H]  4.5   |  24.0  |  1.79[H]    Ca    8.1[L]      24 Feb 2025 03:15  Phos  3.0     02-24  Mg     2.2     02-24    TPro  5.9[L]  /  Alb  3.4  /  TBili  0.7  /  DBili  x   /  AST  16  /  ALT  16  /  AlkPhos  84  02-24          CAPILLARY BLOOD GLUCOSE      POCT Blood Glucose.: 119 mg/dL (24 Feb 2025 06:11)    PTT - ( 24 Feb 2025 03:15 )  PTT:53.1 sec  Urinalysis Basic - ( 24 Feb 2025 03:15 )    Color: x / Appearance: x / SG: x / pH: x  Gluc: 119 mg/dL / Ketone: x  / Bili: x / Urobili: x   Blood: x / Protein: x / Nitrite: x   Leuk Esterase: x / RBC: x / WBC x   Sq Epi: x / Non Sq Epi: x / Bacteria: x      CULTURES:    Physical Examination:    General: No acute distress.      HEENT: Pupils equal, reactive to light.  Symmetric.    PULM: Clear to auscultation bilaterally, no significant sputum production    NECK: Supple, no lymphadenopathy, trachea midline    CVS: Regular rate and rhythm, no murmurs, rubs, or gallops    ABD: Soft, nondistended, nontender, normoactive bowel sounds, no masses    EXT: No edema, nontender    SKIN: Warm and well perfused, no rashes noted.    NEURO: Alert, oriented, interactive, nonfocal    LINES: TVP in RIJ    DEVICES:     RADIOLOGY: < from: Xray Chest 1 View- PORTABLE-Routine (Xray Chest 1 View- PORTABLE-Routine .) (02.22.25 @ 22:58) >    INTERPRETATION:  TECHNIQUE: Multiple studies of the chest.    COMPARISON:  10/21/2022    CLINICAL HISTORY: Chest Pain    FINDINGS:    5:28 PM. Single frontal view of the chest demonstrates the lungs to be   clear. The cardiomediastinal silhouette is enlarged. Right-sided central   venous catheter sheath in the proximal SVC.. No acute osseous   abnormalities. Overlying EKG leads and wires are noted    6:01 PM. Right-sided transvenous pacer in the right atrium    10:15 PM. Right-sided transvenous pacer in the right ventricle. Clear   lungs.    IMPRESSION: No acute cardiopulmonary disease process.    --- End of Report ---    < end of copied text >  < from: CT Wrist No Cont, Right (09.26.23 @ 15:01) >    IMPRESSION:  1.  Nondisplaced intra-articular fracture at the base of the second   metacarpal with extension into the second CMC joint. Early bridging   trabecular bone is identified consistent with healing.  2.  Small subcutaneous hematoma in the dorsum of the hand overlying the   second metacarpal measuring 26 x 11 x 26 mm.    --- End of Report ---    < end of copied text >      CRITICAL CARE TIME SPENT: ***   Patient is a 74y old  Male who presents with a chief complaint of Complete Heart Block (23 Feb 2025 12:27)      BRIEF HOSPITAL COURSE:   73 y/o M with PMHx, CAD s/p stent x2 in Oct 2019, IDDM (Last A1C, 12), HTN, HLD, s/p depeti, active tobacco use disorder, who presented to [facility] from home after a syncopal event reported to have lasted only a few seconds. Patient was advised by his cardiologist to present to ED. On arrival, patient reporting weakness and lightheadedness for several days PTA. Patient was diaphoretic and dizzy in ED, with eventual second brief syncopal event. ECG revealed CHB with HR between 20-30 BPM. EP consulted, recommended TVP which was successfully performed in ED (2/22/25). Patient subsequently admitted to MICU and started on dopamine. Dopamine off since 2/23/25 at 8:00. Of note, pt had PSA of 25 and A1c of 11.6 on admission.    Events last 24 hours: NPO since midnight. Pt denies CP, n/v, and other complaints. Remains on TVP at rate of 80, 5 mA. Planned for dual-chamber pacemaker today.    PAST MEDICAL & SURGICAL HISTORY:  DM (diabetes mellitus)      HLD (hyperlipidemia)      CAD (coronary artery disease)      HTN (hypertension)      BPH with elevated PSA      Bilateral cataracts      Gall bladder stones      H/O cardiac catheterization      S/P cholecystectomy      Bilateral cataracts            Medications:        ondansetron Injectable 4 milliGRAM(s) IV Push once      clopidogrel Tablet 75 milliGRAM(s) Oral daily  heparin   Injectable 5000 Unit(s) SubCutaneous every 8 hours    pantoprazole  Injectable 40 milliGRAM(s) IV Push daily      atorvastatin 40 milliGRAM(s) Oral at bedtime  dextrose 50% Injectable 25 Gram(s) IV Push once  dextrose Oral Gel 15 Gram(s) Oral once PRN  glucagon  Injectable 1 milliGRAM(s) IntraMuscular once  insulin glargine Injectable (LANTUS) 15 Unit(s) SubCutaneous at bedtime  insulin lispro (ADMELOG) corrective regimen sliding scale   SubCutaneous three times a day before meals    dextrose 5%. 1000 milliLiter(s) IV Continuous <Continuous>    influenza  Vaccine (HIGH DOSE) 0.5 milliLiter(s) IntraMuscular once    chlorhexidine 2% Cloths 1 Application(s) Topical <User Schedule>            ICU Vital Signs Last 24 Hrs  T(C): 37 (23 Feb 2025 23:00), Max: 37 (23 Feb 2025 23:00)  T(F): 98.6 (23 Feb 2025 23:00), Max: 98.6 (23 Feb 2025 23:00)  HR: 80 (24 Feb 2025 07:00) (80 - 83)  BP: 108/94 (24 Feb 2025 07:00) (88/57 - 130/84)  BP(mean): 101 (24 Feb 2025 07:00) (68 - 101)  ABP: --  ABP(mean): --  RR: 16 (24 Feb 2025 07:00) (12 - 18)  SpO2: 95% (24 Feb 2025 07:00) (94% - 100%)    O2 Parameters below as of 24 Feb 2025 04:00  Patient On (Oxygen Delivery Method): room air                I&O's Detail    23 Feb 2025 07:01  -  24 Feb 2025 07:00  --------------------------------------------------------  IN:  Total IN: 0 mL    OUT:    Voided (mL): 1300 mL  Total OUT: 1300 mL    Total NET: -1300 mL            LABS:                        13.3   6.96  )-----------( 150      ( 24 Feb 2025 03:15 )             40.4     02-24    140  |  105  |  30.0[H]  ----------------------------<  119[H]  4.5   |  24.0  |  1.79[H]    Ca    8.1[L]      24 Feb 2025 03:15  Phos  3.0     02-24  Mg     2.2     02-24    TPro  5.9[L]  /  Alb  3.4  /  TBili  0.7  /  DBili  x   /  AST  16  /  ALT  16  /  AlkPhos  84  02-24          CAPILLARY BLOOD GLUCOSE      POCT Blood Glucose.: 119 mg/dL (24 Feb 2025 06:11)    PTT - ( 24 Feb 2025 03:15 )  PTT:53.1 sec  Urinalysis Basic - ( 24 Feb 2025 03:15 )    Color: x / Appearance: x / SG: x / pH: x  Gluc: 119 mg/dL / Ketone: x  / Bili: x / Urobili: x   Blood: x / Protein: x / Nitrite: x   Leuk Esterase: x / RBC: x / WBC x   Sq Epi: x / Non Sq Epi: x / Bacteria: x      CULTURES:    Physical Examination:  General: No acute distress.    HEENT: Pupils equal, reactive to light.  Symmetric.  PULM: Clear to auscultation bilaterally, no significant sputum production  CVS: Regular rate and rhythm on transcutaneous pacer  ABD: Soft, nondistended, nontender, normoactive bowel sounds, no masses  EXT: No edema, nontender  SKIN: Warm and well perfused, no rashes noted.  NEURO: Alert, oriented, interactive, nonfocal  LINES: TVP in RIJ at 35 cm    DEVICES:     RADIOLOGY: < from: Xray Chest 1 View- PORTABLE-Routine (Xray Chest 1 View- PORTABLE-Routine .) (02.22.25 @ 22:58) >    INTERPRETATION:  TECHNIQUE: Multiple studies of the chest.    COMPARISON:  10/21/2022    CLINICAL HISTORY: Chest Pain    FINDINGS:    5:28 PM. Single frontal view of the chest demonstrates the lungs to be   clear. The cardiomediastinal silhouette is enlarged. Right-sided central   venous catheter sheath in the proximal SVC.. No acute osseous   abnormalities. Overlying EKG leads and wires are noted    6:01 PM. Right-sided transvenous pacer in the right atrium    10:15 PM. Right-sided transvenous pacer in the right ventricle. Clear   lungs.    IMPRESSION: No acute cardiopulmonary disease process.    --- End of Report ---    < end of copied text >  < from: CT Wrist No Cont, Right (09.26.23 @ 15:01) >    IMPRESSION:  1.  Nondisplaced intra-articular fracture at the base of the second   metacarpal with extension into the second CMC joint. Early bridging   trabecular bone is identified consistent with healing.  2.  Small subcutaneous hematoma in the dorsum of the hand overlying the   second metacarpal measuring 26 x 11 x 26 mm.    --- End of Report ---    < end of copied text >      CRITICAL CARE TIME SPENT: ***

## 2025-02-24 NOTE — PROGRESS NOTE ADULT - ASSESSMENT
Assessment & Plan:   VINOD YUNG is a 73 y/o M with PMHx, CAD s/p stent x2 in Oct 2019, IDDM (Last A1C, 12), HTN, HLD, s/p deepti, active tobacco use disorder admitted to [facility] MICU for:      PLAN:  #ProblemList    #Recurrent syncope with findings of Complete heart block, s/p TVP placement with good capture  # Poorly controlled diabetes with A1C 11. 6  # CAD s/p stent x2, current smoker  #Elevated PSA by report with frequent urination, concern for possible Prostate Cancer vs just BPH  # tobacco use disorder  #ANSHU    ====================== NEUROLOGY=====================  ondansetron Injectable 4 milliGRAM(s) IV Push once  AOx4, no acute needs  ==================== RESPIRATORY======================  Mechanical Ventilation:    - Sating well on RA, continue to monitor on   - Aspiration precautions  ====================CARDIOVASCULAR==================  -TVP in place with good capture, confirmed during rounds this AM  - Avoid AV yony blocking agents  - PPM placement planned for 02/24,NPO AFTER MIDNIGHT 02/23  - Maintain MAP>65  - Cardiac monitor  ===================HEMATOLOGIC/ONC ===================  clopidogrel Tablet 75 milliGRAM(s) Oral daily  heparin   Injectable 5000 Unit(s) SubCutaneous every 8 hours    - trend H&H, transfuse as needed to maintain Hgb > 7  ===================== RENAL =========================  Continue monitoring urine output    - ANSHU noted on labsjadnal in setting of CHB  - Cr improving  - Follow BUN/Cr  - Avoid nephrotoxic agents     ==================== GASTROINTESTINAL===================  dextrose 5%. 1000 milliLiter(s) (50 mL/Hr) IV Continuous <Continuous>  pantoprazole  Injectable 40 milliGRAM(s) IV Push daily    - Protonix for GI ppx  - NPO for procedure    =======================    ENDOCRINE  =====================  atorvastatin 40 milliGRAM(s) Oral at bedtime  dextrose 50% Injectable 25 Gram(s) IV Push once  dextrose Oral Gel 15 Gram(s) Oral once PRN Blood Glucose LESS THAN 70 milliGRAM(s)/deciliter  glucagon  Injectable 1 milliGRAM(s) IntraMuscular once  insulin glargine Injectable (LANTUS) 15 Unit(s) SubCutaneous at bedtime  insulin lispro (ADMELOG) corrective regimen sliding scale   SubCutaneous three times a day before meals    Blood sugar goal: 100-200  -A1C noted to be 11.6  - Consider Endo consult post procedure  - Lantus 15 QHS w/ ISS  -cw statin    ========================INFECTIOUS DISEASE================    - No acute needs  - will monitor off abx  Cx:       Care plan discussed with ICU care team  Plan d/w Family  Dispo: Downgrade pending PPM placement 02/24       Assessment & Plan:   73 y/o M with PMHx, CAD s/p stent x2 in Oct 2019, IDDM (Last A1C, 12), HTN, HLD, s/p deepti, active tobacco use disorder admitted to MICU for complete heart block on transvenous pacing.    # Recurrent syncope 2/2 Complete heart block, s/p TVP placement  # Poorly controlled DM (A1C 11.6)  # CAD s/p stent x2  # Elevated PSA   # Tobacco use disorder  # ANSHU    ====================== NEUROLOGY=====================  ondansetron Injectable 4 milliGRAM(s) IV Push once    - AOx4, no acute needs    ==================== RESPIRATORY======================    - Saturating well on RA, continue to monitor on   - Aspiration precautions    ====================CARDIOVASCULAR==================    - Unclear etiology of heart block; denies recent infection, electrolytes wnl, TTE 2/22 normal cardiac function  - TTE (2/22/25): LVEF >75%  - TVP in place at 35 cm since 2/22/25  - Avoid AV yony blocking agents  - PPM placement planned for 02/24 with EP  - Maintain MAP>65    ===================HEMATOLOGIC/ONC ===================  clopidogrel Tablet 75 milliGRAM(s) Oral daily  heparin   Injectable 5000 Unit(s) SubCutaneous every 8 hours    - DVT ppx w heparin 5000U SQ q8h  - Continue clopidogrel 75mg for PCIx2  - Trend H&H, transfuse as needed to maintain Hgb > 7  - Restart aspirin post-procedure once cleared by EP for CAD (pt stopped taking on his own)    ===================== RENAL =========================    - Continue monitoring urine output   - Avoid Nephrotoxic agents   - Adjusting medications for renal  function   - Replacing electrolytes as needed with Goal K> 4, PO> 3, Mg> 2  - ANSHU likely prerenal, monitor Cr     ==================== GASTROINTESTINAL===================  dextrose 5%. 1000 milliLiter(s) (50 mL/Hr) IV Continuous <Continuous>  pantoprazole  Injectable 40 milliGRAM(s) IV Push daily    - Stress ulcer ppx: continue protonix 40mg IV  - NPO for procedure planned for today    =======================    ENDOCRINE  =====================  atorvastatin 40 milliGRAM(s) Oral at bedtime  dextrose 50% Injectable 25 Gram(s) IV Push once  dextrose Oral Gel 15 Gram(s) Oral once PRN Blood Glucose LESS THAN 70 milliGRAM(s)/deciliter  glucagon  Injectable 1 milliGRAM(s) IntraMuscular once  insulin glargine Injectable (LANTUS) 15 Unit(s) SubCutaneous at bedtime  insulin lispro (ADMELOG) corrective regimen sliding scale   SubCutaneous three times a day before meals    - Blood sugar goal: 100-200  - A1C noted to be 11.6 on admission  - Endo consult post procedure  - Lantus 15 QHS w/ ISS  - Cw statin  - TSH elevated at 7.35 on admission, normal T4    ========================INFECTIOUS DISEASE================    - Afebrile, no signs of infection  - will monitor off ABx    Care plan discussed with ICU care team  Plan d/w Family  Dispo: Downgrade pending PPM placement 02/24

## 2025-02-25 DIAGNOSIS — I44.2 ATRIOVENTRICULAR BLOCK, COMPLETE: ICD-10-CM

## 2025-02-25 PROBLEM — K80.20 CALCULUS OF GALLBLADDER WITHOUT CHOLECYSTITIS WITHOUT OBSTRUCTION: Chronic | Status: ACTIVE | Noted: 2025-02-22

## 2025-02-25 PROBLEM — H26.9 UNSPECIFIED CATARACT: Chronic | Status: ACTIVE | Noted: 2025-02-22

## 2025-02-25 PROBLEM — I10 ESSENTIAL (PRIMARY) HYPERTENSION: Chronic | Status: ACTIVE | Noted: 2025-02-22

## 2025-02-25 PROBLEM — N40.0 BENIGN PROSTATIC HYPERPLASIA WITHOUT LOWER URINARY TRACT SYMPTOMS: Chronic | Status: ACTIVE | Noted: 2025-02-22

## 2025-02-25 LAB
ALBUMIN SERPL ELPH-MCNC: 3.2 G/DL — LOW (ref 3.3–5.2)
ALP SERPL-CCNC: 84 U/L — SIGNIFICANT CHANGE UP (ref 40–120)
ALT FLD-CCNC: 13 U/L — SIGNIFICANT CHANGE UP
ANION GAP SERPL CALC-SCNC: 10 MMOL/L — SIGNIFICANT CHANGE UP (ref 5–17)
AST SERPL-CCNC: 18 U/L — SIGNIFICANT CHANGE UP
BASOPHILS # BLD AUTO: 0.03 K/UL — SIGNIFICANT CHANGE UP (ref 0–0.2)
BASOPHILS NFR BLD AUTO: 0.5 % — SIGNIFICANT CHANGE UP (ref 0–2)
BILIRUB SERPL-MCNC: 0.7 MG/DL — SIGNIFICANT CHANGE UP (ref 0.4–2)
BUN SERPL-MCNC: 26.8 MG/DL — HIGH (ref 8–20)
CALCIUM SERPL-MCNC: 7.9 MG/DL — LOW (ref 8.4–10.5)
CHLORIDE SERPL-SCNC: 106 MMOL/L — SIGNIFICANT CHANGE UP (ref 96–108)
CO2 SERPL-SCNC: 23 MMOL/L — SIGNIFICANT CHANGE UP (ref 22–29)
CREAT SERPL-MCNC: 1.3 MG/DL — SIGNIFICANT CHANGE UP (ref 0.5–1.3)
EGFR: 58 ML/MIN/1.73M2 — LOW
EOSINOPHIL # BLD AUTO: 0.09 K/UL — SIGNIFICANT CHANGE UP (ref 0–0.5)
EOSINOPHIL NFR BLD AUTO: 1.5 % — SIGNIFICANT CHANGE UP (ref 0–6)
GLUCOSE BLDC GLUCOMTR-MCNC: 104 MG/DL — HIGH (ref 70–99)
GLUCOSE BLDC GLUCOMTR-MCNC: 119 MG/DL — HIGH (ref 70–99)
GLUCOSE BLDC GLUCOMTR-MCNC: 135 MG/DL — HIGH (ref 70–99)
GLUCOSE BLDC GLUCOMTR-MCNC: 148 MG/DL — HIGH (ref 70–99)
GLUCOSE BLDC GLUCOMTR-MCNC: 160 MG/DL — HIGH (ref 70–99)
GLUCOSE SERPL-MCNC: 141 MG/DL — HIGH (ref 70–99)
HCT VFR BLD CALC: 37.6 % — LOW (ref 39–50)
HGB BLD-MCNC: 12.7 G/DL — LOW (ref 13–17)
IMM GRANULOCYTES # BLD AUTO: 0.09 K/UL — HIGH (ref 0–0.07)
IMM GRANULOCYTES NFR BLD AUTO: 1.5 % — HIGH (ref 0–0.9)
LYMPHOCYTES # BLD AUTO: 1.4 K/UL — SIGNIFICANT CHANGE UP (ref 1–3.3)
LYMPHOCYTES NFR BLD AUTO: 24.1 % — SIGNIFICANT CHANGE UP (ref 13–44)
MAGNESIUM SERPL-MCNC: 2.2 MG/DL — SIGNIFICANT CHANGE UP (ref 1.6–2.6)
MCHC RBC-ENTMCNC: 29.2 PG — SIGNIFICANT CHANGE UP (ref 27–34)
MCHC RBC-ENTMCNC: 33.8 G/DL — SIGNIFICANT CHANGE UP (ref 32–36)
MCV RBC AUTO: 86.4 FL — SIGNIFICANT CHANGE UP (ref 80–100)
MONOCYTES # BLD AUTO: 0.42 K/UL — SIGNIFICANT CHANGE UP (ref 0–0.9)
MONOCYTES NFR BLD AUTO: 7.2 % — SIGNIFICANT CHANGE UP (ref 2–14)
NEUTROPHILS # BLD AUTO: 3.78 K/UL — SIGNIFICANT CHANGE UP (ref 1.8–7.4)
NEUTROPHILS NFR BLD AUTO: 65.2 % — SIGNIFICANT CHANGE UP (ref 43–77)
NRBC # BLD AUTO: 0 K/UL — SIGNIFICANT CHANGE UP (ref 0–0)
NRBC # FLD: 0 K/UL — SIGNIFICANT CHANGE UP (ref 0–0)
NRBC BLD AUTO-RTO: 0 /100 WBCS — SIGNIFICANT CHANGE UP (ref 0–0)
PHOSPHATE SERPL-MCNC: 3.1 MG/DL — SIGNIFICANT CHANGE UP (ref 2.4–4.7)
PLATELET # BLD AUTO: 127 K/UL — LOW (ref 150–400)
PMV BLD: 11.2 FL — SIGNIFICANT CHANGE UP (ref 7–13)
POTASSIUM SERPL-MCNC: 4.3 MMOL/L — SIGNIFICANT CHANGE UP (ref 3.5–5.3)
POTASSIUM SERPL-SCNC: 4.3 MMOL/L — SIGNIFICANT CHANGE UP (ref 3.5–5.3)
PROT SERPL-MCNC: 5.9 G/DL — LOW (ref 6.6–8.7)
RBC # BLD: 4.35 M/UL — SIGNIFICANT CHANGE UP (ref 4.2–5.8)
RBC # FLD: 12.5 % — SIGNIFICANT CHANGE UP (ref 10.3–14.5)
SODIUM SERPL-SCNC: 139 MMOL/L — SIGNIFICANT CHANGE UP (ref 135–145)
WBC # BLD: 5.81 K/UL — SIGNIFICANT CHANGE UP (ref 3.8–10.5)
WBC # FLD AUTO: 5.81 K/UL — SIGNIFICANT CHANGE UP (ref 3.8–10.5)

## 2025-02-25 PROCEDURE — 93010 ELECTROCARDIOGRAM REPORT: CPT

## 2025-02-25 PROCEDURE — 99232 SBSQ HOSP IP/OBS MODERATE 35: CPT

## 2025-02-25 PROCEDURE — 99233 SBSQ HOSP IP/OBS HIGH 50: CPT

## 2025-02-25 PROCEDURE — 71046 X-RAY EXAM CHEST 2 VIEWS: CPT | Mod: 26

## 2025-02-25 RX ORDER — ACETAMINOPHEN 500 MG/5ML
650 LIQUID (ML) ORAL EVERY 6 HOURS
Refills: 0 | Status: DISCONTINUED | OUTPATIENT
Start: 2025-02-25 | End: 2025-02-26

## 2025-02-25 RX ADMIN — Medication 650 MILLIGRAM(S): at 18:10

## 2025-02-25 RX ADMIN — Medication 1 APPLICATION(S): at 05:08

## 2025-02-25 RX ADMIN — ATORVASTATIN CALCIUM 40 MILLIGRAM(S): 80 TABLET, FILM COATED ORAL at 23:17

## 2025-02-25 RX ADMIN — INSULIN GLARGINE-YFGN 15 UNIT(S): 100 INJECTION, SOLUTION SUBCUTANEOUS at 23:14

## 2025-02-25 RX ADMIN — Medication 650 MILLIGRAM(S): at 09:15

## 2025-02-25 RX ADMIN — Medication 40 MILLIGRAM(S): at 08:30

## 2025-02-25 RX ADMIN — Medication 650 MILLIGRAM(S): at 08:30

## 2025-02-25 RX ADMIN — Medication 650 MILLIGRAM(S): at 17:34

## 2025-02-25 RX ADMIN — CLOPIDOGREL BISULFATE 75 MILLIGRAM(S): 75 TABLET, FILM COATED ORAL at 11:27

## 2025-02-25 NOTE — PHYSICAL THERAPY INITIAL EVALUATION ADULT - GENERAL OBSERVATIONS, REHAB EVAL
Pt received seated in bed + telemetry//BP, daughter at bedside, Pt c/o 0/10 pain, pt agreeable to PT

## 2025-02-25 NOTE — PHYSICAL THERAPY INITIAL EVALUATION ADULT - PERTINENT HX OF CURRENT PROBLEM, REHAB EVAL
75 yo M with a past medical history of CAD (s/p 2 stents in October 2019), insulin-dependent diabetes mellitus, hypertension, hyperlipidemia, status-post cholecystectomy who presented to ED after a brief syncopal episode at home. While in the ED, he was diaphoretic and dizzy, and experienced a second brief syncopal episode. An ECG revealed complete heart block with a heart rate between 20-30 bpm. Electrophysiology was consulted and recommended transvenous pacing (TVP), which was successfully performed in the ED on 2/22/25. He was subsequently admitted to the MICU and started on a dopamine drip. The dopamine was discontinued on 2/23/25 at 0800. He then underwent uncomplicated placement of a dual-chamber pacemaker with left bundle branch non-selective area pacing.

## 2025-02-25 NOTE — PHYSICAL THERAPY INITIAL EVALUATION ADULT - RANGE OF MOTION EXAMINATION, REHAB EVAL
RLE Hip flexion limited by pain, LUE tested within PPM precautions./Right UE ROM was WFL (within functional limits)/Left LE ROM was WFL (within functional limits)

## 2025-02-25 NOTE — PROGRESS NOTE ADULT - ASSESSMENT
Patient is a 73 yo M with a past medical history of CAD (s/p 2 stents in October 2019), insulin-dependent diabetes mellitus, hypertension, hyperlipidemia, status-post cholecystectomy who presented to ED after a brief syncopal episode at home, per his cardiologist’s recommendation. Upon arrival, he reported several days of weakness and lightheadedness. While in the ED, he was diaphoretic and dizzy, and experienced a second brief syncopal episode. An ECG revealed complete heart block with a heart rate between 20-30 bpm. Electrophysiology was consulted and recommended transvenous pacing (TVP), which was successfully performed in the ED on 2/22/25. He was subsequently admitted to the MICU and started on a dopamine drip. The dopamine was discontinued on 2/23/25 at 0800. He then underwent uncomplicated placement of a dual-chamber pacemaker with left bundle branch non-selective area pacing. He is now hemodynamically stable and ready for transfer to the medical floor.    Syncope due to heart block  - EP recommendations appreciated  - TTE reviewed  - S/p PPM placement on 2/24  - Monitor on telemetry  - No anticoagulation until cleared by EP  - EKG, CXR check today morning  - Keep site dry x 1 week, no lifting affected arm over shoulder x 6 weeks    DM2  - A1C: 11.6  - Hold home oral medications  - Lantus 15u QHS, BGM w SS, FS  - Carb consistent diet    CAD, HTN, HLD  - Continue Plavix, Statin  - BB held due to heart block, restart per EP    Elevated PSA  - PSA: 25  - Outpatient follow up with Urology    ANSHU  - likely due to dehydration  - now resolved  - monitor    DVT ppx: SCDs    Dispo: Downgraded from MICU to Medicine  Patient is a 73 yo M with a past medical history of CAD (s/p 2 stents in October 2019), insulin-dependent diabetes mellitus, hypertension, hyperlipidemia, status-post cholecystectomy who presented to ED after a brief syncopal episode at home, per his cardiologist’s recommendation. Upon arrival, he reported several days of weakness and lightheadedness. While in the ED, he was diaphoretic and dizzy, and experienced a second brief syncopal episode. An ECG revealed complete heart block with a heart rate between 20-30 bpm. Electrophysiology was consulted and recommended transvenous pacing (TVP), which was successfully performed in the ED on 2/22/25. He was subsequently admitted to the MICU and started on a dopamine drip. The dopamine was discontinued on 2/23/25 at 0800. He then underwent uncomplicated placement of a dual-chamber pacemaker with left bundle branch non-selective area pacing. He is now hemodynamically stable and ready for transfer to the medical floor.    Syncope due to heart block  - EP recommendations appreciated  - TTE reviewed  - S/p PPM placement on 2/24  - Monitor on telemetry  - No anticoagulation until cleared by EP  - EKG, CXR check today morning  - Keep site dry x 1 week, no lifting affected arm over shoulder x 6 weeks    DM2  - A1C: 11.6  - Hold home oral medications  - Lantus 15u QHS, BGM w SS, FS  - Carb consistent diet    CAD, HTN, HLD  - Continue Plavix, Statin  - BB held due to heart block, restart per EP    Elevated PSA  - PSA: 25  - Outpatient follow up with Urology    ANSHU  - likely due to dehydration  - now resolved  - monitor    DVT ppx: SCDs    Dispo: Downgraded from MICU to Medicine. Anticipate discharge home within 24 hours pending EP and PT clearance.

## 2025-02-25 NOTE — PROGRESS NOTE ADULT - SUBJECTIVE AND OBJECTIVE BOX
MICU to Medicine Transfer Acceptance Note  Hospital Course:  Patient is a 73 yo M with a past medical history of CAD (s/p 2 stents in October 2019), insulin-dependent diabetes mellitus, hypertension, hyperlipidemia, status-post cholecystectomy who presented to ED after a brief syncopal episode at home, per his cardiologist’s recommendation. Upon arrival, he reported several days of weakness and lightheadedness. While in the ED, he was diaphoretic and dizzy, and experienced a second brief syncopal episode. An ECG revealed complete heart block with a heart rate between 20-30 bpm. Electrophysiology was consulted and recommended transvenous pacing (TVP), which was successfully performed in the ED on 2/22/25. He was subsequently admitted to the MICU and started on a dopamine drip. The dopamine was discontinued on 2/23/25 at 0800. He then underwent uncomplicated placement of a dual-chamber pacemaker with left bundle branch non-selective area pacing. He is now hemodynamically stable and ready for transfer to the medical floor.    [facility] Division of Hospital Medicine  Mike Wilder MD    Chief Complaint:  Patient is a 74y old  Male who presents with a chief complaint of Complete Heart Block (24 Feb 2025 21:46)      SUBJECTIVE / OVERNIGHT EVENTS:  Patient seen and examined at bedside. No acute events reported overnight. No new complaints.    MEDICATIONS  (STANDING):  atorvastatin 40 milliGRAM(s) Oral at bedtime  ceFAZolin  Injectable. 2000 milliGRAM(s) IV Push once  chlorhexidine 2% Cloths 1 Application(s) Topical <User Schedule>  clopidogrel Tablet 75 milliGRAM(s) Oral daily  dextrose 5%. 1000 milliLiter(s) (50 mL/Hr) IV Continuous <Continuous>  dextrose 50% Injectable 25 Gram(s) IV Push once  glucagon  Injectable 1 milliGRAM(s) IntraMuscular once  influenza  Vaccine (HIGH DOSE) 0.5 milliLiter(s) IntraMuscular once  insulin glargine Injectable (LANTUS) 15 Unit(s) SubCutaneous at bedtime  insulin lispro (ADMELOG) corrective regimen sliding scale   SubCutaneous three times a day before meals  ondansetron Injectable 4 milliGRAM(s) IV Push once  pantoprazole    Tablet 40 milliGRAM(s) Oral before breakfast    MEDICATIONS  (PRN):  dextrose Oral Gel 15 Gram(s) Oral once PRN Blood Glucose LESS THAN 70 milliGRAM(s)/deciliter        I&O's Summary    24 Feb 2025 07:01  -  25 Feb 2025 07:00  --------------------------------------------------------  IN: 0 mL / OUT: 600 mL / NET: -600 mL        PHYSICAL EXAM:  Vital Signs Last 24 Hrs  T(C): 36.6 (25 Feb 2025 04:47), Max: 36.7 (24 Feb 2025 07:38)  T(F): 97.9 (25 Feb 2025 04:47), Max: 98 (24 Feb 2025 07:38)  HR: 65 (25 Feb 2025 06:00) (63 - 82)  BP: 114/67 (25 Feb 2025 06:00) (99/70 - 146/71)  BP(mean): 82 (25 Feb 2025 06:00) (78 - 96)  RR: 12 (25 Feb 2025 06:00) (9 - 18)  SpO2: 99% (25 Feb 2025 06:00) (92% - 100%)    Parameters below as of 25 Feb 2025 04:00  Patient On (Oxygen Delivery Method): room air            CONSTITUTIONAL: NAD  HEENT: NC/AT, PERRL, no JVD  RESPIRATORY: CTA bilaterally, normal effort  CARDIOVASCULAR: RRR, S1/S2+, no m/g/r  ABDOMEN: Nontender to palpation, normoactive bowel sounds, no rebound/guarding  MUSCULOSKELETAL: No edema, cyanosis or deformities.  PSYCH: Calm, affect appropriate.  NEUROLOGY: Awake, alert, no focal neurological deficits.   SKIN: No rashes; no palpable lesions  VASC: Distal pulses palpable    LABS:                        12.7   5.81  )-----------( 127      ( 25 Feb 2025 04:20 )             37.6     02-25    139  |  106  |  26.8[H]  ----------------------------<  141[H]  4.3   |  23.0  |  1.30    Ca    7.9[L]      25 Feb 2025 04:20  Phos  3.1     02-25  Mg     2.2     02-25    TPro  5.9[L]  /  Alb  3.2[L]  /  TBili  0.7  /  DBili  x   /  AST  18  /  ALT  13  /  AlkPhos  84  02-25    PTT - ( 24 Feb 2025 03:15 )  PTT:53.1 sec      Urinalysis Basic - ( 25 Feb 2025 04:20 )    Color: x / Appearance: x / SG: x / pH: x  Gluc: 141 mg/dL / Ketone: x  / Bili: x / Urobili: x   Blood: x / Protein: x / Nitrite: x   Leuk Esterase: x / RBC: x / WBC x   Sq Epi: x / Non Sq Epi: x / Bacteria: x        CAPILLARY BLOOD GLUCOSE      POCT Blood Glucose.: 124 mg/dL (24 Feb 2025 22:51)  POCT Blood Glucose.: 109 mg/dL (24 Feb 2025 15:29)  POCT Blood Glucose.: 87 mg/dL (24 Feb 2025 12:48)        RADIOLOGY & ADDITIONAL TESTS:  Results Reviewed:   Imaging Personally Reviewed:  Electrocardiogram Personally Reviewed:                                           MICU to Medicine Transfer Acceptance Note  Hospital Course:  Patient is a 73 yo M with a past medical history of CAD (s/p 2 stents in October 2019), insulin-dependent diabetes mellitus, hypertension, hyperlipidemia, status-post cholecystectomy who presented to ED after a brief syncopal episode at home, per his cardiologist’s recommendation. Upon arrival, he reported several days of weakness and lightheadedness. While in the ED, he was diaphoretic and dizzy, and experienced a second brief syncopal episode. An ECG revealed complete heart block with a heart rate between 20-30 bpm. Electrophysiology was consulted and recommended transvenous pacing (TVP), which was successfully performed in the ED on 2/22/25. He was subsequently admitted to the MICU and started on a dopamine drip. The dopamine was discontinued on 2/23/25 at 0800. He then underwent uncomplicated placement of a dual-chamber pacemaker with left bundle branch non-selective area pacing. He is now hemodynamically stable and ready for transfer to the medical floor.    [facility] Division of Hospital Medicine  Mike Wilder MD    Chief Complaint:  Patient is a 74y old  Male who presents with a chief complaint of Complete Heart Block (24 Feb 2025 21:46)      SUBJECTIVE / OVERNIGHT EVENTS:  Patient seen and examined at bedside. No acute events reported overnight. No new complaints.    MEDICATIONS  (STANDING):  atorvastatin 40 milliGRAM(s) Oral at bedtime  ceFAZolin  Injectable. 2000 milliGRAM(s) IV Push once  chlorhexidine 2% Cloths 1 Application(s) Topical <User Schedule>  clopidogrel Tablet 75 milliGRAM(s) Oral daily  dextrose 5%. 1000 milliLiter(s) (50 mL/Hr) IV Continuous <Continuous>  dextrose 50% Injectable 25 Gram(s) IV Push once  glucagon  Injectable 1 milliGRAM(s) IntraMuscular once  influenza  Vaccine (HIGH DOSE) 0.5 milliLiter(s) IntraMuscular once  insulin glargine Injectable (LANTUS) 15 Unit(s) SubCutaneous at bedtime  insulin lispro (ADMELOG) corrective regimen sliding scale   SubCutaneous three times a day before meals  ondansetron Injectable 4 milliGRAM(s) IV Push once  pantoprazole    Tablet 40 milliGRAM(s) Oral before breakfast    MEDICATIONS  (PRN):  dextrose Oral Gel 15 Gram(s) Oral once PRN Blood Glucose LESS THAN 70 milliGRAM(s)/deciliter        I&O's Summary    24 Feb 2025 07:01  -  25 Feb 2025 07:00  --------------------------------------------------------  IN: 0 mL / OUT: 600 mL / NET: -600 mL        PHYSICAL EXAM:  Vital Signs Last 24 Hrs  T(C): 36.6 (25 Feb 2025 04:47), Max: 36.7 (24 Feb 2025 07:38)  T(F): 97.9 (25 Feb 2025 04:47), Max: 98 (24 Feb 2025 07:38)  HR: 65 (25 Feb 2025 06:00) (63 - 82)  BP: 114/67 (25 Feb 2025 06:00) (99/70 - 146/71)  BP(mean): 82 (25 Feb 2025 06:00) (78 - 96)  RR: 12 (25 Feb 2025 06:00) (9 - 18)  SpO2: 99% (25 Feb 2025 06:00) (92% - 100%)    Parameters below as of 25 Feb 2025 04:00  Patient On (Oxygen Delivery Method): room air            CONSTITUTIONAL: NAD  HEENT: NC/AT, PERRL, no JVD  CHEST WALL: PPM dressing c/d/i, sling in place  RESPIRATORY: CTA bilaterally, normal effort  CARDIOVASCULAR: RRR, S1/S2+, no m/g/r  ABDOMEN: Nontender to palpation, normoactive bowel sounds, no rebound/guarding  MUSCULOSKELETAL: No edema, cyanosis or deformities.  PSYCH: Calm, affect appropriate.  NEUROLOGY: Awake, alert, no focal neurological deficits.   SKIN: No rashes; no palpable lesions  VASC: Distal pulses palpable    LABS:                        12.7   5.81  )-----------( 127      ( 25 Feb 2025 04:20 )             37.6     02-25    139  |  106  |  26.8[H]  ----------------------------<  141[H]  4.3   |  23.0  |  1.30    Ca    7.9[L]      25 Feb 2025 04:20  Phos  3.1     02-25  Mg     2.2     02-25    TPro  5.9[L]  /  Alb  3.2[L]  /  TBili  0.7  /  DBili  x   /  AST  18  /  ALT  13  /  AlkPhos  84  02-25    PTT - ( 24 Feb 2025 03:15 )  PTT:53.1 sec      Urinalysis Basic - ( 25 Feb 2025 04:20 )    Color: x / Appearance: x / SG: x / pH: x  Gluc: 141 mg/dL / Ketone: x  / Bili: x / Urobili: x   Blood: x / Protein: x / Nitrite: x   Leuk Esterase: x / RBC: x / WBC x   Sq Epi: x / Non Sq Epi: x / Bacteria: x        CAPILLARY BLOOD GLUCOSE      POCT Blood Glucose.: 124 mg/dL (24 Feb 2025 22:51)  POCT Blood Glucose.: 109 mg/dL (24 Feb 2025 15:29)  POCT Blood Glucose.: 87 mg/dL (24 Feb 2025 12:48)        RADIOLOGY & ADDITIONAL TESTS:  Results Reviewed:   Imaging Personally Reviewed:  Electrocardiogram Personally Reviewed:

## 2025-02-25 NOTE — PHYSICAL THERAPY INITIAL EVALUATION ADULT - CRITERIA FOR SKILLED THERAPEUTIC INTERVENTIONS
Home with assist, Least restrictive device, Outpatient PT pending progress/impairments found/anticipated discharge recommendation

## 2025-02-25 NOTE — OCCUPATIONAL THERAPY INITIAL EVALUATION ADULT - PERTINENT HX OF CURRENT PROBLEM, REHAB EVAL
PMHx of CAD (s/p 2 stents in October 2019), insulin-dependent diabetes mellitus, hypertension, hyperlipidemia, status-post cholecystectomy.

## 2025-02-25 NOTE — CHART NOTE - NSCHARTNOTEFT_GEN_A_CORE
Nutrition Note: Aware pt being downgraded from ICU to tele/. Chart reviewed; RD to follow up with full nutrition assessment per medical floor protocol.

## 2025-02-25 NOTE — OCCUPATIONAL THERAPY INITIAL EVALUATION ADULT - ADDITIONAL COMMENTS
Pt has a shower with doors and a built in bench, grab bar on the shower door only. Pt was independent PTA without an assist device. 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 Someone is always home to assist.

## 2025-02-25 NOTE — OCCUPATIONAL THERAPY INITIAL EVALUATION ADULT - GENERAL OBSERVATIONS, REHAB EVAL
Pt left as received seated in bedside chair, NAD, +IV lock, +Tele//BP, on RA, daughter at bedside. Pt c/o 0/10 pre and post pain. Pt agreeable to OT evaluation

## 2025-02-25 NOTE — PROGRESS NOTE ADULT - PROVIDER SPECIALTY LIST ADULT
Critical Care
Electrophysiology
MICU
Hospitalist

## 2025-02-25 NOTE — PHYSICAL THERAPY INITIAL EVALUATION ADULT - NEUROVASCULAR ASSESSMENT RLE
Not able to send refills until patient comes in for appointment.    Ricki Gómez MD  Family Medicine   no numbness

## 2025-02-25 NOTE — PROGRESS NOTE ADULT - REASON FOR ADMISSION
Complete Heart Block

## 2025-02-25 NOTE — ADVANCED PRACTICE NURSE CONSULT - RECOMMEDATIONS
continue diabetes self management education w pt and family  pt to inject all insulin doses while in the hospital using prefilled insulin syringe and rn supervision  insulin pen teaching including pen setting testing dosing and injection  bg monitoring teaching  cc- pls set home care

## 2025-02-25 NOTE — OCCUPATIONAL THERAPY INITIAL EVALUATION ADULT - LIVES WITH, PROFILE
Pt lives in a private home with his spouse (currently out of the country), 2 daughters, son in law and 9 y/o grandson. Family is able and willing to assist. 1 step to enter without handrails, no steps inside./children/spouse

## 2025-02-25 NOTE — PROGRESS NOTE ADULT - NS ATTEND AMEND GEN_ALL_CORE FT
Agree with assessment and plan as above  Patient with CAD hypertension diabetes complete heart block and syncope status post dual-chamber PPM [employer] device nonselective LBB area pacing stable lead testing  Follow-up for wound check in 2 weeks  Continue current medications and follow-up with cardiology and PCP    I spent a total of 35 minutes on the encounter, including chart review, evaluating and discussing treatment options with the patient, as well as counseling and coordination as stated above.

## 2025-02-25 NOTE — OCCUPATIONAL THERAPY INITIAL EVALUATION ADULT - VISUAL ACUITY
+glasses (present at eval-bifocals). No complaints in vision offered. Central and peripheral field in tact bilaterally

## 2025-02-25 NOTE — OCCUPATIONAL THERAPY INITIAL EVALUATION ADULT - DIAGNOSIS, OT EVAL
74 year old Male presented to ED after a brief syncopal episode at home. While in the ED, he was diaphoretic and dizzy, and experienced a second brief syncopal episode. An ECG revealed complete heart block with a heart rate between 20-30 bpm. Electrophysiology consulted and recommended transvenous pacing (TVP), which was successfully performed in the ED on 2/22/25. Pt subsequently admitted to the MICU. He then underwent uncomplicated placement of a dual-chamber pacemaker with L bundle branch non-selective area pacing.

## 2025-02-25 NOTE — PROGRESS NOTE ADULT - SUBJECTIVE AND OBJECTIVE BOX
Patient seen today in bed. Pressure dressing removed from LACW without complication. Dressing removed from Right IJ without complication.     EKG: A sensed RV paced at 73bpm; qRSD 132ms  TELE: No events    MEDICATIONS  (STANDING):  atorvastatin 40 milliGRAM(s) Oral at bedtime  ceFAZolin  Injectable. 2000 milliGRAM(s) IV Push once  chlorhexidine 2% Cloths 1 Application(s) Topical <User Schedule>  clopidogrel Tablet 75 milliGRAM(s) Oral daily  dextrose 5%. 1000 milliLiter(s) (50 mL/Hr) IV Continuous <Continuous>  dextrose 50% Injectable 25 Gram(s) IV Push once  glucagon  Injectable 1 milliGRAM(s) IntraMuscular once  influenza  Vaccine (HIGH DOSE) 0.5 milliLiter(s) IntraMuscular once  insulin glargine Injectable (LANTUS) 15 Unit(s) SubCutaneous at bedtime  insulin lispro (ADMELOG) corrective regimen sliding scale   SubCutaneous three times a day before meals  ondansetron Injectable 4 milliGRAM(s) IV Push once  pantoprazole    Tablet 40 milliGRAM(s) Oral before breakfast    MEDICATIONS  (PRN):  acetaminophen     Tablet .. 650 milliGRAM(s) Oral every 6 hours PRN Mild Pain (1 - 3), Moderate Pain (4 - 6)  dextrose Oral Gel 15 Gram(s) Oral once PRN Blood Glucose LESS THAN 70 milliGRAM(s)/deciliter    Allergies  No Known Allergies    PAST MEDICAL & SURGICAL HISTORY:  DM (diabetes mellitus)  HLD (hyperlipidemia)  CAD (coronary artery disease)  HTN (hypertension)  BPH with elevated PSA  Bilateral cataracts  Gall bladder stones  H/O cardiac catheterization  S/P cholecystectomy  Bilateral cataracts    Vital Signs Last 24 Hrs  T(C): 36.6 (25 Feb 2025 04:47), Max: 36.7 (24 Feb 2025 16:16)  T(F): 97.9 (25 Feb 2025 04:47), Max: 98 (24 Feb 2025 16:16)  HR: 91 (25 Feb 2025 09:00) (63 - 91)  BP: 149/76 (25 Feb 2025 09:00) (104/72 - 149/76)  BP(mean): 96 (25 Feb 2025 09:00) (78 - 102)  RR: 16 (25 Feb 2025 09:00) (9 - 18)  SpO2: 100% (25 Feb 2025 09:00) (92% - 100%)    Physical Exam:  Constitutional: NAD, AAOx3  Right IJ: No hematoma present  Cardiovascular: +S1S2 RRR  Pulmonary: CTA b/l, unlabored  LACW: Dermabond CDI; no evidence of pocket hematoma.   GI: soft NTND +BS  Extremities: no pedal edema,   Neuro: non focal, POTTS x4    LABS:                        12.7   5.81  )-----------( 127      ( 25 Feb 2025 04:20 )             37.6     139  |  106  |  26.8[H]  ----------------------------<  141[H]  4.3   |  23.0  |  1.30  Ca    7.9[L]      25 Feb 2025 04:20  Phos  3.1     02-25  Mg     2.2     02-25  TPro  5.9[L]  /  Alb  3.2[L]  /  TBili  0.7  /  DBili  x   /  AST  18  /  ALT  13  /  AlkPhos  84  02-25  PTT - ( 24 Feb 2025 03:15 )  PTT:53.1 sec    A/P  74 year old male with CAD s/p stent x2 in Oct 2019, IDDM (Last A1C, 12), HTN, HLD, s/p deepti, active tobacco use disorder, who presented with a syncopal event and CHB requiring a temporary pacing wire. Patient no POD#1 s/p uncomplicated Dual Chamber PPM Implant - LBB nonselective area pacing.    - Device check performed with excellent pacing and sensing  - CXR with excellent lead position  - Wound check arranged in two weeks time  - No objection to discharge planning from EP perspective.         Patient seen today in bed. Pressure dressing removed from LACW without complication. Dressing removed from Right IJ without complication.     EKG: A sensed RV paced at 73bpm; qRSD 132ms  TELE: No events    MEDICATIONS  (STANDING):  atorvastatin 40 milliGRAM(s) Oral at bedtime  ceFAZolin  Injectable. 2000 milliGRAM(s) IV Push once  chlorhexidine 2% Cloths 1 Application(s) Topical <User Schedule>  clopidogrel Tablet 75 milliGRAM(s) Oral daily  dextrose 5%. 1000 milliLiter(s) (50 mL/Hr) IV Continuous <Continuous>  dextrose 50% Injectable 25 Gram(s) IV Push once  glucagon  Injectable 1 milliGRAM(s) IntraMuscular once  influenza  Vaccine (HIGH DOSE) 0.5 milliLiter(s) IntraMuscular once  insulin glargine Injectable (LANTUS) 15 Unit(s) SubCutaneous at bedtime  insulin lispro (ADMELOG) corrective regimen sliding scale   SubCutaneous three times a day before meals  ondansetron Injectable 4 milliGRAM(s) IV Push once  pantoprazole    Tablet 40 milliGRAM(s) Oral before breakfast    MEDICATIONS  (PRN):  acetaminophen     Tablet .. 650 milliGRAM(s) Oral every 6 hours PRN Mild Pain (1 - 3), Moderate Pain (4 - 6)  dextrose Oral Gel 15 Gram(s) Oral once PRN Blood Glucose LESS THAN 70 milliGRAM(s)/deciliter    Allergies  No Known Allergies    PAST MEDICAL & SURGICAL HISTORY:  DM (diabetes mellitus)  HLD (hyperlipidemia)  CAD (coronary artery disease)  HTN (hypertension)  BPH with elevated PSA  Bilateral cataracts  Gall bladder stones  H/O cardiac catheterization  S/P cholecystectomy  Bilateral cataracts    Vital Signs Last 24 Hrs  T(C): 36.6 (25 Feb 2025 04:47), Max: 36.7 (24 Feb 2025 16:16)  T(F): 97.9 (25 Feb 2025 04:47), Max: 98 (24 Feb 2025 16:16)  HR: 91 (25 Feb 2025 09:00) (63 - 91)  BP: 149/76 (25 Feb 2025 09:00) (104/72 - 149/76)  BP(mean): 96 (25 Feb 2025 09:00) (78 - 102)  RR: 16 (25 Feb 2025 09:00) (9 - 18)  SpO2: 100% (25 Feb 2025 09:00) (92% - 100%)    Physical Exam:  Constitutional: NAD, AAOx3  Right IJ: No hematoma present  Cardiovascular: +S1S2 RRR  Pulmonary: CTA b/l, unlabored  LACW: Dermabond CDI; no evidence of pocket hematoma.   GI: soft NTND +BS  Extremities: no pedal edema,   Neuro: non focal, POTTS x4    LABS:                        12.7   5.81  )-----------( 127      ( 25 Feb 2025 04:20 )             37.6     139  |  106  |  26.8[H]  ----------------------------<  141[H]  4.3   |  23.0  |  1.30  Ca    7.9[L]      25 Feb 2025 04:20  Phos  3.1     02-25  Mg     2.2     02-25  TPro  5.9[L]  /  Alb  3.2[L]  /  TBili  0.7  /  DBili  x   /  AST  18  /  ALT  13  /  AlkPhos  84  02-25  PTT - ( 24 Feb 2025 03:15 )  PTT:53.1 sec    A/P  74 year old male with CAD s/p stent x2 in Oct 2019, IDDM (Last A1C, 12), HTN, HLD, s/p deepti, active tobacco use disorder, who presented with a syncopal event and CHB requiring a temporary pacing wire. Patient now POD#1 s/p uncomplicated Dual Chamber PPM Implant - LBB nonselective area pacing.    - Device check performed with excellent pacing and sensing  - CXR with excellent lead position  - Wound check arranged in two weeks time  - No objection to discharge planning from EP perspective.

## 2025-02-25 NOTE — PHYSICAL THERAPY INITIAL EVALUATION ADULT - ADDITIONAL COMMENTS
Pt lives in a private home with his spouse (currently out of the country), 2 daughters, son in law and 9 y/o grandson. 1 steps to enter without handrails, no steps inside. Pt was independent PTA without an assist device. 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

## 2025-02-25 NOTE — OCCUPATIONAL THERAPY INITIAL EVALUATION ADULT - EATING, PREVIOUS LEVEL OF FUNCTION, OT EVAL
Rx Refill Note  Requested Prescriptions     Pending Prescriptions Disp Refills   • atorvastatin (LIPITOR) 20 MG tablet [Pharmacy Med Name: Atorvastatin Calcium 20 MG Oral Tablet] 30 tablet 0     Sig: Take 1 tablet by mouth once daily      Last office visit with prescribing clinician: 4/27/2021      Next office visit with prescribing clinician: Visit date not found            Pita Segura MA  04/18/22, 09:54 EDT   independent

## 2025-02-26 ENCOUNTER — TRANSCRIPTION ENCOUNTER (OUTPATIENT)
Age: 75
End: 2025-02-26

## 2025-02-26 VITALS
DIASTOLIC BLOOD PRESSURE: 76 MMHG | RESPIRATION RATE: 16 BRPM | TEMPERATURE: 98 F | SYSTOLIC BLOOD PRESSURE: 124 MMHG | HEART RATE: 93 BPM | OXYGEN SATURATION: 97 %

## 2025-02-26 LAB
GLUCOSE BLDC GLUCOMTR-MCNC: 121 MG/DL — HIGH (ref 70–99)
GLUCOSE BLDC GLUCOMTR-MCNC: 81 MG/DL — SIGNIFICANT CHANGE UP (ref 70–99)

## 2025-02-26 PROCEDURE — C1892: CPT

## 2025-02-26 PROCEDURE — 85025 COMPLETE CBC W/AUTO DIFF WBC: CPT

## 2025-02-26 PROCEDURE — 93005 ELECTROCARDIOGRAM TRACING: CPT

## 2025-02-26 PROCEDURE — 71045 X-RAY EXAM CHEST 1 VIEW: CPT

## 2025-02-26 PROCEDURE — C1769: CPT

## 2025-02-26 PROCEDURE — C1889: CPT

## 2025-02-26 PROCEDURE — 84295 ASSAY OF SERUM SODIUM: CPT

## 2025-02-26 PROCEDURE — 82435 ASSAY OF BLOOD CHLORIDE: CPT

## 2025-02-26 PROCEDURE — 87640 STAPH A DNA AMP PROBE: CPT

## 2025-02-26 PROCEDURE — 83880 ASSAY OF NATRIURETIC PEPTIDE: CPT

## 2025-02-26 PROCEDURE — 97167 OT EVAL HIGH COMPLEX 60 MIN: CPT

## 2025-02-26 PROCEDURE — 96374 THER/PROPH/DIAG INJ IV PUSH: CPT

## 2025-02-26 PROCEDURE — 82330 ASSAY OF CALCIUM: CPT

## 2025-02-26 PROCEDURE — 84436 ASSAY OF TOTAL THYROXINE: CPT

## 2025-02-26 PROCEDURE — 83605 ASSAY OF LACTIC ACID: CPT

## 2025-02-26 PROCEDURE — 82962 GLUCOSE BLOOD TEST: CPT

## 2025-02-26 PROCEDURE — 86900 BLOOD TYPING SEROLOGIC ABO: CPT

## 2025-02-26 PROCEDURE — 84484 ASSAY OF TROPONIN QUANT: CPT

## 2025-02-26 PROCEDURE — 83036 HEMOGLOBIN GLYCOSYLATED A1C: CPT

## 2025-02-26 PROCEDURE — 93306 TTE W/DOPPLER COMPLETE: CPT

## 2025-02-26 PROCEDURE — 71046 X-RAY EXAM CHEST 2 VIEWS: CPT

## 2025-02-26 PROCEDURE — 82947 ASSAY GLUCOSE BLOOD QUANT: CPT

## 2025-02-26 PROCEDURE — 99285 EMERGENCY DEPT VISIT HI MDM: CPT

## 2025-02-26 PROCEDURE — 86901 BLOOD TYPING SEROLOGIC RH(D): CPT

## 2025-02-26 PROCEDURE — 84443 ASSAY THYROID STIM HORMONE: CPT

## 2025-02-26 PROCEDURE — 85018 HEMOGLOBIN: CPT

## 2025-02-26 PROCEDURE — C1785: CPT

## 2025-02-26 PROCEDURE — 36415 COLL VENOUS BLD VENIPUNCTURE: CPT

## 2025-02-26 PROCEDURE — 87637 SARSCOV2&INF A&B&RSV AMP PRB: CPT

## 2025-02-26 PROCEDURE — 97163 PT EVAL HIGH COMPLEX 45 MIN: CPT

## 2025-02-26 PROCEDURE — 0241U: CPT

## 2025-02-26 PROCEDURE — 99239 HOSP IP/OBS DSCHRG MGMT >30: CPT

## 2025-02-26 PROCEDURE — 85730 THROMBOPLASTIN TIME PARTIAL: CPT

## 2025-02-26 PROCEDURE — 84480 ASSAY TRIIODOTHYRONINE (T3): CPT

## 2025-02-26 PROCEDURE — 84132 ASSAY OF SERUM POTASSIUM: CPT

## 2025-02-26 PROCEDURE — 82803 BLOOD GASES ANY COMBINATION: CPT

## 2025-02-26 PROCEDURE — 33208 INSRT HEART PM ATRIAL & VENT: CPT

## 2025-02-26 PROCEDURE — G0103: CPT

## 2025-02-26 PROCEDURE — 86850 RBC ANTIBODY SCREEN: CPT

## 2025-02-26 PROCEDURE — 87641 MR-STAPH DNA AMP PROBE: CPT

## 2025-02-26 PROCEDURE — 84100 ASSAY OF PHOSPHORUS: CPT

## 2025-02-26 PROCEDURE — C1894: CPT

## 2025-02-26 PROCEDURE — 83735 ASSAY OF MAGNESIUM: CPT

## 2025-02-26 PROCEDURE — C1898: CPT

## 2025-02-26 PROCEDURE — 80053 COMPREHEN METABOLIC PANEL: CPT

## 2025-02-26 PROCEDURE — 85014 HEMATOCRIT: CPT

## 2025-02-26 RX ORDER — INSULIN GLARGINE-YFGN 100 [IU]/ML
15 INJECTION, SOLUTION SUBCUTANEOUS
Qty: 0 | Refills: 0 | DISCHARGE

## 2025-02-26 RX ADMIN — Medication 650 MILLIGRAM(S): at 02:17

## 2025-02-26 RX ADMIN — Medication 650 MILLIGRAM(S): at 12:39

## 2025-02-26 RX ADMIN — Medication 650 MILLIGRAM(S): at 03:17

## 2025-02-26 RX ADMIN — Medication 40 MILLIGRAM(S): at 05:39

## 2025-02-26 RX ADMIN — CLOPIDOGREL BISULFATE 75 MILLIGRAM(S): 75 TABLET, FILM COATED ORAL at 11:38

## 2025-02-26 RX ADMIN — Medication 1 APPLICATION(S): at 05:39

## 2025-02-26 RX ADMIN — Medication 650 MILLIGRAM(S): at 11:39

## 2025-02-26 NOTE — ADVANCED PRACTICE NURSE CONSULT - RECOMMEDATIONS
continue diabetes self management education  pt to inject all insulin doses while in the hospital using prefilled insulin syringe and rn supervision  pt to demonstrate correct use of insulin pen prior to dc

## 2025-02-26 NOTE — DISCHARGE NOTE PROVIDER - HOSPITAL COURSE
Patient is a 75 yo M with a past medical history of CAD (s/p 2 stents in October 2019), insulin-dependent diabetes mellitus, hypertension, hyperlipidemia, status-post cholecystectomy who presented to ED after a brief syncopal episode at home, per his cardiologist’s recommendation. Upon arrival, he reported several days of weakness and lightheadedness. While in the ED, he was diaphoretic and dizzy, and experienced a second brief syncopal episode. An ECG revealed complete heart block with a heart rate between 20-30 bpm. Electrophysiology was consulted and recommended transvenous pacing (TVP), which was successfully performed in the ED on 2/22/25. He was subsequently admitted to the MICU and started on a dopamine drip. The dopamine was discontinued on 2/23/25 at 0800. He then underwent uncomplicated placement of a dual-chamber pacemaker with left bundle branch non-selective area pacing. He is now hemodynamically stable and ready for transfer to the medical floor. Pt doing post post-procedure and cleared by EP for discharge.

## 2025-02-26 NOTE — DISCHARGE NOTE NURSING/CASE MANAGEMENT/SOCIAL WORK - FINANCIAL ASSISTANCE
[facility] provides services at a reduced cost to those who are determined to be eligible through [facility]’s financial assistance program. Information regarding [facility]’s financial assistance program can be found by going to [email or web address] or by calling [phone].

## 2025-02-26 NOTE — DISCHARGE NOTE PROVIDER - NSDCCPCAREPLAN_GEN_ALL_CORE_FT
PRINCIPAL DISCHARGE DIAGNOSIS  Diagnosis: Third degree heart block  Assessment and Plan of Treatment: Status post Pace- maker Placement on 2/24/2025. Please take medications as prescribed and follow up with the Electrophysiologist.      SECONDARY DISCHARGE DIAGNOSES  Diagnosis: Diabetes mellitus  Assessment and Plan of Treatment: Please follow a consistent carbohydrate diet and check your blood glucose before and meals and at bedtime.  Take medications as prescribed and follow up with your Primary Care Provider.

## 2025-02-26 NOTE — DISCHARGE NOTE PROVIDER - CARE PROVIDER_API CALL
Ralf Bledsoe  Cardiac Electrophysiology  [address] [place] [address]  Phone: [phone]  Fax: [phone]  Follow Up Time: 2 weeks

## 2025-02-26 NOTE — DISCHARGE NOTE PROVIDER - NSDCFUADDINST_GEN_ALL_CORE_FT
Keep incision site clean and dry    Please seek medical attention if you develop new or worsening symptoms.

## 2025-02-26 NOTE — DISCHARGE NOTE PROVIDER - NSDCMRMEDTOKEN_GEN_ALL_CORE_FT
Basaglar KwikPen 100 units/mL subcutaneous solution: 13 unit(s) subcutaneous once a day (at bedtime)  clopidogrel 75 mg oral tablet: 1 tab(s) orally once a day  Jardiance 10 mg oral tablet: 1 tab(s) orally UNKNOWN DOSE  Lipitor 40 mg oral tablet: 1 tab(s) orally once a day  metFORMIN 1000 mg oral tablet, extended release: 1 tab(s) orally 2 times a day-resume 5/1 am  metoprolol tartrate 25 mg oral tablet: 1 tab(s) orally 2 times a day   Basaglar KwikPen 100 units/mL subcutaneous solution: 15 unit(s) subcutaneous once a day (at bedtime)  clopidogrel 75 mg oral tablet: 1 tab(s) orally once a day  Jardiance 10 mg oral tablet: 1 tab(s) orally UNKNOWN DOSE  Lipitor 40 mg oral tablet: 1 tab(s) orally once a day  metFORMIN 1000 mg oral tablet, extended release: 1 tab(s) orally 2 times a day-resume 5/1 am  metoprolol tartrate 25 mg oral tablet: 1 tab(s) orally 2 times a day  pantoprazole 40 mg oral delayed release tablet: 1 tab(s) orally once a day (before a meal)

## 2025-02-26 NOTE — DISCHARGE NOTE NURSING/CASE MANAGEMENT/SOCIAL WORK - PATIENT PORTAL LINK FT
You can access the FollowMyHealth Patient Portal offered by [facility] by registering at the following website: [email or web address]. By joining [employer]’s FollowMyHealth portal, you will also be able to view your health information using other applications (apps) compatible with our system.

## 2025-02-26 NOTE — ADVANCED PRACTICE NURSE CONSULT - ASSESSMENT
return to see pt and family in am pt is a+ox3 co 0 pain. revieew w pt the importance of using insulin and cheking bg as perscribed. reminded him that it can not stop by him and miss doses but by a dr only. pt verbalized understanding.
went to see pt in am pt is a+ox3 co 0 pain. pt states he was on insulin but was not consistently taking it because he forget pt and family were educated about the importance of taking insulin as perscribrd. he is also not consistent w bg monitoring. he has an endocrinologist he follows with as part of his cardiac team. pt was invited to [facility] diabetes club on March 9 2025 yearly calendar provided

## 2025-03-11 RX ORDER — EMPAGLIFLOZIN 10 MG/1
10 TABLET, FILM COATED ORAL DAILY
Refills: 0 | Status: ACTIVE | COMMUNITY

## 2025-03-12 ENCOUNTER — APPOINTMENT (OUTPATIENT)
Dept: ELECTROPHYSIOLOGY | Facility: CLINIC | Age: 75
End: 2025-03-12
Payer: COMMERCIAL

## 2025-03-12 ENCOUNTER — NON-APPOINTMENT (OUTPATIENT)
Age: 75
End: 2025-03-12

## 2025-03-12 VITALS
OXYGEN SATURATION: 99 % | BODY MASS INDEX: 28.12 KG/M2 | HEIGHT: 66 IN | WEIGHT: 175 LBS | HEART RATE: 75 BPM | DIASTOLIC BLOOD PRESSURE: 75 MMHG | SYSTOLIC BLOOD PRESSURE: 131 MMHG

## 2025-03-12 DIAGNOSIS — Z95.0 PRESENCE OF CARDIAC PACEMAKER: ICD-10-CM

## 2025-03-12 DIAGNOSIS — I44.2 ATRIOVENTRICULAR BLOCK, COMPLETE: ICD-10-CM

## 2025-03-12 PROCEDURE — 99024 POSTOP FOLLOW-UP VISIT: CPT

## 2025-03-19 ENCOUNTER — APPOINTMENT (OUTPATIENT)
Dept: ELECTROPHYSIOLOGY | Facility: CLINIC | Age: 75
End: 2025-03-19

## 2025-03-19 VITALS
DIASTOLIC BLOOD PRESSURE: 80 MMHG | HEART RATE: 78 BPM | BODY MASS INDEX: 28.12 KG/M2 | OXYGEN SATURATION: 98 % | RESPIRATION RATE: 14 BRPM | SYSTOLIC BLOOD PRESSURE: 130 MMHG | WEIGHT: 175 LBS | HEIGHT: 66 IN

## 2025-03-19 PROBLEM — I44.2 CHB (COMPLETE HEART BLOCK): Status: ACTIVE | Noted: 2025-03-19

## 2025-03-19 PROBLEM — Z95.0 CARDIAC PACEMAKER: Status: ACTIVE | Noted: 2025-03-19

## 2025-03-19 PROCEDURE — 99024 POSTOP FOLLOW-UP VISIT: CPT

## 2025-03-31 ENCOUNTER — APPOINTMENT (OUTPATIENT)
Dept: ELECTROPHYSIOLOGY | Facility: CLINIC | Age: 75
End: 2025-03-31
Payer: COMMERCIAL

## 2025-03-31 VITALS
OXYGEN SATURATION: 100 % | DIASTOLIC BLOOD PRESSURE: 65 MMHG | HEIGHT: 66 IN | SYSTOLIC BLOOD PRESSURE: 127 MMHG | BODY MASS INDEX: 28.12 KG/M2 | HEART RATE: 52 BPM | WEIGHT: 175 LBS

## 2025-03-31 DIAGNOSIS — Z95.0 PRESENCE OF CARDIAC PACEMAKER: ICD-10-CM

## 2025-03-31 DIAGNOSIS — I44.2 ATRIOVENTRICULAR BLOCK, COMPLETE: ICD-10-CM

## 2025-03-31 PROCEDURE — 99024 POSTOP FOLLOW-UP VISIT: CPT

## 2025-06-09 RX ORDER — HYALURONATE SODIUM 20 MG/2 ML
20 SYRINGE (ML) INTRAARTICULAR
Qty: 3 | Refills: 0 | Status: ACTIVE | OUTPATIENT
Start: 2025-06-06

## 2025-06-17 ENCOUNTER — APPOINTMENT (OUTPATIENT)
Dept: ELECTROPHYSIOLOGY | Facility: CLINIC | Age: 75
End: 2025-06-17
Payer: COMMERCIAL

## 2025-06-17 ENCOUNTER — NON-APPOINTMENT (OUTPATIENT)
Age: 75
End: 2025-06-17

## 2025-06-17 VITALS
RESPIRATION RATE: 104 BRPM | BODY MASS INDEX: 28.12 KG/M2 | SYSTOLIC BLOOD PRESSURE: 139 MMHG | HEIGHT: 66 IN | HEART RATE: 80 BPM | DIASTOLIC BLOOD PRESSURE: 85 MMHG | WEIGHT: 175 LBS | OXYGEN SATURATION: 99 %

## 2025-06-17 PROCEDURE — 93280 PM DEVICE PROGR EVAL DUAL: CPT

## 2025-06-30 ENCOUNTER — APPOINTMENT (OUTPATIENT)
Dept: ORTHOPEDIC SURGERY | Facility: CLINIC | Age: 75
End: 2025-06-30

## 2025-07-01 ENCOUNTER — APPOINTMENT (OUTPATIENT)
Dept: ORTHOPEDIC SURGERY | Facility: CLINIC | Age: 75
End: 2025-07-01
Payer: COMMERCIAL

## 2025-07-01 VITALS
HEART RATE: 70 BPM | WEIGHT: 175 LBS | BODY MASS INDEX: 28.12 KG/M2 | SYSTOLIC BLOOD PRESSURE: 125 MMHG | DIASTOLIC BLOOD PRESSURE: 75 MMHG | HEIGHT: 66 IN

## 2025-07-01 PROBLEM — M17.11 ARTHRITIS OF KNEE, RIGHT: Status: ACTIVE | Noted: 2025-06-06

## 2025-07-01 PROBLEM — M25.551 PAIN OF RIGHT HIP JOINT: Status: ACTIVE | Noted: 2025-07-01

## 2025-07-01 PROBLEM — M54.50 RIGHT-SIDED LOW BACK PAIN WITHOUT SCIATICA, UNSPECIFIED CHRONICITY: Status: ACTIVE | Noted: 2025-07-01

## 2025-07-01 PROCEDURE — 20610 DRAIN/INJ JOINT/BURSA W/O US: CPT | Mod: RT
